# Patient Record
Sex: FEMALE | Race: WHITE | NOT HISPANIC OR LATINO | Employment: UNEMPLOYED | URBAN - NONMETROPOLITAN AREA
[De-identification: names, ages, dates, MRNs, and addresses within clinical notes are randomized per-mention and may not be internally consistent; named-entity substitution may affect disease eponyms.]

---

## 2017-01-18 ENCOUNTER — OFFICE VISIT (OUTPATIENT)
Dept: CARDIOLOGY | Facility: CLINIC | Age: 52
End: 2017-01-18

## 2017-01-18 VITALS
HEART RATE: 85 BPM | BODY MASS INDEX: 31.02 KG/M2 | WEIGHT: 158 LBS | HEIGHT: 60 IN | DIASTOLIC BLOOD PRESSURE: 94 MMHG | RESPIRATION RATE: 16 BRPM | OXYGEN SATURATION: 98 % | SYSTOLIC BLOOD PRESSURE: 162 MMHG

## 2017-01-18 DIAGNOSIS — R07.2 PRECORDIAL PAIN: Primary | ICD-10-CM

## 2017-01-18 DIAGNOSIS — I10 ESSENTIAL HYPERTENSION: ICD-10-CM

## 2017-01-18 DIAGNOSIS — E78.5 DYSLIPIDEMIA: ICD-10-CM

## 2017-01-18 PROCEDURE — 93000 ELECTROCARDIOGRAM COMPLETE: CPT | Performed by: INTERNAL MEDICINE

## 2017-01-18 PROCEDURE — 99213 OFFICE O/P EST LOW 20 MIN: CPT | Performed by: INTERNAL MEDICINE

## 2017-01-18 RX ORDER — AMLODIPINE BESYLATE 5 MG/1
10 TABLET ORAL DAILY
COMMUNITY
Start: 2017-01-13 | End: 2017-05-30 | Stop reason: SINTOL

## 2017-01-18 NOTE — MR AVS SNAPSHOT
Diana Alfaro   1/18/2017 3:00 PM   Office Visit    Dept Phone:  [phone]   Encounter #:  [id number]    Provider:  Cornell Perkins MD   Department:  [facility] CARDIOLOGY                Your Full Care Plan              Today's Medication Changes          These changes are accurate as of: 1/18/17  4:17 PM.  If you have any questions, ask your nurse or doctor.               Stop taking medication(s)listed here:     ibuprofen 800 MG tablet   Commonly known as:  ADVIL,MOTRIN   Stopped by:  Cornell Perkins MD           montelukast 10 MG tablet   Commonly known as:  SINGULAIR   Stopped by:  Cornell Perkins MD                      Your Updated Medication List          This list is accurate as of: 1/18/17  4:17 PM.  Always use your most recent med list.                amLODIPine 5 MG tablet   Commonly known as:  NORVASC       atorvastatin 40 MG tablet   Commonly known as:  LIPITOR   Take 1 tablet by mouth Daily.       vitamin D 71906 UNITS capsule capsule   Commonly known as:  ERGOCALCIFEROL               We Performed the Following     ECG 12 Lead       You Were Diagnosed With        Codes Comments    Precordial pain    -  Primary ICD-10-CM: R07.2  ICD-9-CM: 786.51     Essential hypertension     ICD-10-CM: I10  ICD-9-CM: 401.9     Dyslipidemia     ICD-10-CM: E78.5  ICD-9-CM: 272.4       Instructions     None    Patient Instructions History      Upcoming Appointments     Visit Type Date Time Department    FOLLOW UP 1/18/2017  3:00 PM [facility]    FOLLOW UP 7/20/2017  1:20 PM [facility] [place]enzie Signup     Our records indicate that you have declined [facility] MyChart signup. If you would like to sign up for [email or web address], please email [email or web address] or call [phone] to obtain an activation code.             Other Info from Your Visit           Your Appointments     Jul 20, 2017  1:20 PM EDT   Follow Up with Cornell Perkins MD  "  [facility] CARDIOLOGY (--)    [address] [place] [id number] [phone]           Arrive 15 minutes prior to appointment.              Allergies     No Known Allergies      Reason for Visit     Hypertension Follow up . Also S/P E.R. Jan 14th for CP and HTN      Vital Signs     Blood Pressure Pulse Respirations Height Weight Oxygen Saturation    162/94 (BP Location: Right arm, Patient Position: Sitting, Cuff Size: Adult) 85 16 60\" (152.4 cm) 158 lb (71.7 kg) 98%    Body Mass Index Smoking Status                30.86 kg/m2 Never Smoker          Problems and Diagnoses Noted     Dyslipidemia    High blood pressure    Precordial chest pain        "

## 2017-01-18 NOTE — PROGRESS NOTES
TERI Govea  Diana Alfaro  1965  01/18/2017    Patient Active Problem List   Diagnosis   • Dyslipidemia   • Essential hypertension       Dear TERI Govea:    Subjective     Diana Alfaro is a 51 y.o. female with the problems as listed above, presents for follow up of chest pain.      History of Present Illness: Patient has complaints of intermittent and sharp chest pain.  It's of mild intensity with no associated shortness of breath or diaphoresis.      No Known Allergies:      Current Outpatient Prescriptions:   •  amLODIPine (NORVASC) 5 MG tablet, Take 5 mg by mouth Daily., Disp: , Rfl:   •  atorvastatin (LIPITOR) 40 MG tablet, Take 1 tablet by mouth Daily., Disp: 30 tablet, Rfl: 11  •  vitamin D (ERGOCALCIFEROL) 31293 UNITS capsule capsule, Take 50,000 Units by mouth 1 (One) Time Per Week., Disp: , Rfl:       The following portions of the patient's history were reviewed and updated as appropriate: allergies, current medications, past family history, past medical history, past social history, past surgical history and problem list.    Social History   Substance Use Topics   • Smoking status: Never Smoker   • Smokeless tobacco: Never Used   • Alcohol use No       Review of Systems   Constitution: Positive for chills and malaise/fatigue.   HENT: Positive for headaches (occasional). Negative for nosebleeds.    Eyes: Positive for blurred vision.   Cardiovascular: Positive for chest pain (sharp pain lt sided ) and dyspnea on exertion.   Respiratory: Negative for cough and wheezing.    Gastrointestinal: Negative for constipation, diarrhea, melena, nausea and vomiting.   Genitourinary: Negative for dysuria.   Neurological: Positive for light-headedness (rare, ). Negative for dizziness.   Allergic/Immunologic: Positive for environmental allergies.       Objective   Vitals:    01/18/17 1507   BP: 162/94   BP Location: Right arm   Patient Position: Sitting   Cuff Size: Adult   Pulse: 85   Resp:  "16   SpO2: 98%   Weight: 158 lb (71.7 kg)   Height: 60\" (152.4 cm)     Body mass index is 30.86 kg/(m^2).        Physical Exam   Constitutional: She is oriented to person, place, and time. She appears well-developed and well-nourished.   HENT:   Mouth/Throat: Oropharynx is clear and moist.   Eyes: EOM are normal. Pupils are equal, round, and reactive to light.   Neck: Neck supple. No JVD present. No tracheal deviation present. No thyromegaly present.   Cardiovascular: Normal rate, regular rhythm, S1 normal and S2 normal.  Exam reveals no gallop and no friction rub.    No murmur heard.  Pulmonary/Chest: Effort normal and breath sounds normal.   Abdominal: Soft. Bowel sounds are normal. She exhibits no mass. There is no tenderness.   Musculoskeletal: Normal range of motion. She exhibits edema (mild leg edema).   Lymphadenopathy:     She has no cervical adenopathy.   Neurological: She is alert and oriented to person, place, and time.   Skin: Skin is warm and dry. No rash noted.   Psychiatric: She has a normal mood and affect.           ECG 12 Lead  Date/Time: 1/18/2017 3:15 PM  Performed by: CORNELL PACK  Authorized by: CORNELL PACK   Rhythm: sinus rhythm  BPM: 74              Assessment/Plan :     Diagnosis Plan   1. Precordial pain  ECG 12 Lead   2. Essential hypertension     3. Dyslipidemia          Recommendations:    Increase Amlodipine to 10 mg po daily.  Keep a check on BP.    Return in about 6 months (around 7/18/2017).    As always, I appreciate very much the opportunity to participate in the cardiovascular care of your patients.      With Best Regards,    Cornell Pack MD, [facility]    Dragon disclaimer:  Much of this encounter note is an electronic transcription/translation of spoken language to printed text. The electronic translation of spoken language may permit erroneous, or at times, nonsensical words or phrases to be inadvertently transcribed; Although I have reviewed the note for such errors, some " may still

## 2017-01-20 ENCOUNTER — TELEPHONE (OUTPATIENT)
Dept: CARDIOLOGY | Facility: CLINIC | Age: 52
End: 2017-01-20

## 2017-01-20 NOTE — TELEPHONE ENCOUNTER
Pt called and stated the ER doctor in [place] advised her to start taking a ASA 81 mg. She wanted to make sure it was okay for her to start taking ASA.

## 2017-04-27 ENCOUNTER — OFFICE VISIT (OUTPATIENT)
Dept: CARDIOLOGY | Facility: CLINIC | Age: 52
End: 2017-04-27

## 2017-04-27 VITALS
RESPIRATION RATE: 16 BRPM | DIASTOLIC BLOOD PRESSURE: 89 MMHG | HEIGHT: 60 IN | WEIGHT: 154 LBS | HEART RATE: 87 BPM | SYSTOLIC BLOOD PRESSURE: 156 MMHG | BODY MASS INDEX: 30.23 KG/M2

## 2017-04-27 DIAGNOSIS — I10 ESSENTIAL HYPERTENSION: ICD-10-CM

## 2017-04-27 DIAGNOSIS — E78.2 MIXED HYPERLIPIDEMIA: ICD-10-CM

## 2017-04-27 DIAGNOSIS — R07.2 PRECORDIAL PAIN: Primary | ICD-10-CM

## 2017-04-27 PROCEDURE — 99214 OFFICE O/P EST MOD 30 MIN: CPT | Performed by: INTERNAL MEDICINE

## 2017-04-27 PROCEDURE — 93000 ELECTROCARDIOGRAM COMPLETE: CPT | Performed by: INTERNAL MEDICINE

## 2017-04-27 RX ORDER — ASPIRIN 81 MG/1
81 TABLET ORAL DAILY
COMMUNITY
End: 2017-11-30

## 2017-04-27 RX ORDER — IBUPROFEN 800 MG/1
800 TABLET ORAL EVERY 6 HOURS PRN
Status: ON HOLD | COMMUNITY
End: 2018-03-04

## 2017-04-27 RX ORDER — AMLODIPINE BESYLATE 5 MG/1
5 TABLET ORAL DAILY
Qty: 30 TABLET | Refills: 5 | Status: SHIPPED | OUTPATIENT
Start: 2017-04-27 | End: 2017-05-30 | Stop reason: SINTOL

## 2017-04-27 RX ORDER — HYDROCHLOROTHIAZIDE 12.5 MG/1
12.5 TABLET ORAL DAILY
Qty: 30 TABLET | Refills: 5 | Status: SHIPPED | OUTPATIENT
Start: 2017-04-27 | End: 2017-11-30 | Stop reason: HOSPADM

## 2017-05-01 DIAGNOSIS — R07.2 PRECORDIAL PAIN: Primary | ICD-10-CM

## 2017-05-05 ENCOUNTER — APPOINTMENT (OUTPATIENT)
Dept: CARDIOLOGY | Facility: HOSPITAL | Age: 52
End: 2017-05-05
Attending: INTERNAL MEDICINE

## 2017-05-05 ENCOUNTER — APPOINTMENT (OUTPATIENT)
Dept: NUCLEAR MEDICINE | Facility: HOSPITAL | Age: 52
End: 2017-05-05
Attending: INTERNAL MEDICINE

## 2017-05-10 ENCOUNTER — HOSPITAL ENCOUNTER (EMERGENCY)
Facility: HOSPITAL | Age: 52
Discharge: HOME | End: 2017-05-10
Attending: EMERGENCY MEDICINE | Admitting: EMERGENCY MEDICINE

## 2017-05-10 ENCOUNTER — APPOINTMENT (OUTPATIENT)
Dept: CT IMAGING | Facility: HOSPITAL | Age: 52
End: 2017-05-10

## 2017-05-10 ENCOUNTER — APPOINTMENT (OUTPATIENT)
Dept: GENERAL RADIOLOGY | Facility: HOSPITAL | Age: 52
End: 2017-05-10

## 2017-05-10 VITALS
HEART RATE: 87 BPM | BODY MASS INDEX: 30.04 KG/M2 | SYSTOLIC BLOOD PRESSURE: 140 MMHG | DIASTOLIC BLOOD PRESSURE: 81 MMHG | HEIGHT: 60 IN | OXYGEN SATURATION: 100 % | TEMPERATURE: 98.5 F | RESPIRATION RATE: 18 BRPM | WEIGHT: 153 LBS

## 2017-05-10 DIAGNOSIS — R20.2 PARESTHESIA AND PAIN OF EXTREMITY: Primary | ICD-10-CM

## 2017-05-10 DIAGNOSIS — M79.609 PARESTHESIA AND PAIN OF EXTREMITY: Primary | ICD-10-CM

## 2017-05-10 LAB
ALBUMIN SERPL-MCNC: 4.4 G/DL (ref 3.5–5)
ALBUMIN/GLOB SERPL: 1.3 G/DL (ref 1.5–2.5)
ALP SERPL-CCNC: 99 U/L (ref 35–104)
ALT SERPL W P-5'-P-CCNC: 19 U/L (ref 10–36)
ANION GAP SERPL CALCULATED.3IONS-SCNC: 5.7 MMOL/L (ref 3.6–11.2)
AST SERPL-CCNC: 27 U/L (ref 10–30)
BASOPHILS # BLD AUTO: 0.01 10*3/MM3 (ref 0–0.3)
BASOPHILS NFR BLD AUTO: 0.1 % (ref 0–2)
BILIRUB SERPL-MCNC: 0.4 MG/DL (ref 0.2–1.8)
BILIRUB UR QL STRIP: NEGATIVE
BUN BLD-MCNC: 14 MG/DL (ref 7–21)
BUN/CREAT SERPL: 19.4 (ref 7–25)
CALCIUM SPEC-SCNC: 10.1 MG/DL (ref 7.7–10)
CHLORIDE SERPL-SCNC: 107 MMOL/L (ref 99–112)
CLARITY UR: CLEAR
CO2 SERPL-SCNC: 30.3 MMOL/L (ref 24.3–31.9)
COLOR UR: YELLOW
CREAT BLD-MCNC: 0.72 MG/DL (ref 0.43–1.29)
DEPRECATED RDW RBC AUTO: 38.4 FL (ref 37–54)
EOSINOPHIL # BLD AUTO: 0.01 10*3/MM3 (ref 0–0.7)
EOSINOPHIL NFR BLD AUTO: 0.1 % (ref 0–5)
ERYTHROCYTE [DISTWIDTH] IN BLOOD BY AUTOMATED COUNT: 12.7 % (ref 11.5–14.5)
GFR SERPL CREATININE-BSD FRML MDRD: 85 ML/MIN/1.73
GLOBULIN UR ELPH-MCNC: 3.4 GM/DL
GLUCOSE BLD-MCNC: 113 MG/DL (ref 70–110)
GLUCOSE UR STRIP-MCNC: NEGATIVE MG/DL
HCT VFR BLD AUTO: 43.7 % (ref 37–47)
HGB BLD-MCNC: 14.7 G/DL (ref 12–16)
HGB UR QL STRIP.AUTO: NEGATIVE
IMM GRANULOCYTES # BLD: 0.03 10*3/MM3 (ref 0–0.03)
IMM GRANULOCYTES NFR BLD: 0.4 % (ref 0–0.5)
KETONES UR QL STRIP: NEGATIVE
LEUKOCYTE ESTERASE UR QL STRIP.AUTO: NEGATIVE
LYMPHOCYTES # BLD AUTO: 1.18 10*3/MM3 (ref 1–3)
LYMPHOCYTES NFR BLD AUTO: 15.1 % (ref 21–51)
MCH RBC QN AUTO: 28.2 PG (ref 27–33)
MCHC RBC AUTO-ENTMCNC: 33.6 G/DL (ref 33–37)
MCV RBC AUTO: 83.7 FL (ref 80–94)
MONOCYTES # BLD AUTO: 0.53 10*3/MM3 (ref 0.1–0.9)
MONOCYTES NFR BLD AUTO: 6.8 % (ref 0–10)
NEUTROPHILS # BLD AUTO: 6.06 10*3/MM3 (ref 1.4–6.5)
NEUTROPHILS NFR BLD AUTO: 77.5 % (ref 30–70)
NITRITE UR QL STRIP: NEGATIVE
OSMOLALITY SERPL CALC.SUM OF ELEC: 286.3 MOSM/KG (ref 273–305)
PH UR STRIP.AUTO: 8 [PH] (ref 5–8)
PLATELET # BLD AUTO: 250 10*3/MM3 (ref 130–400)
PMV BLD AUTO: 11.2 FL (ref 6–10)
POTASSIUM BLD-SCNC: 3.7 MMOL/L (ref 3.5–5.3)
PROT SERPL-MCNC: 7.8 G/DL (ref 6–8)
PROT UR QL STRIP: NEGATIVE
RBC # BLD AUTO: 5.22 10*6/MM3 (ref 4.2–5.4)
SODIUM BLD-SCNC: 143 MMOL/L (ref 135–153)
SP GR UR STRIP: 1.01 (ref 1–1.03)
UROBILINOGEN UR QL STRIP: NORMAL
WBC NRBC COR # BLD: 7.82 10*3/MM3 (ref 4.5–12.5)

## 2017-05-10 PROCEDURE — 70450 CT HEAD/BRAIN W/O DYE: CPT

## 2017-05-10 PROCEDURE — 93010 ELECTROCARDIOGRAM REPORT: CPT | Performed by: INTERNAL MEDICINE

## 2017-05-10 PROCEDURE — 81003 URINALYSIS AUTO W/O SCOPE: CPT | Performed by: EMERGENCY MEDICINE

## 2017-05-10 PROCEDURE — 71010 XR CHEST 1 VW: CPT | Performed by: RADIOLOGY

## 2017-05-10 PROCEDURE — 85025 COMPLETE CBC W/AUTO DIFF WBC: CPT | Performed by: EMERGENCY MEDICINE

## 2017-05-10 PROCEDURE — 99284 EMERGENCY DEPT VISIT MOD MDM: CPT

## 2017-05-10 PROCEDURE — 93005 ELECTROCARDIOGRAM TRACING: CPT | Performed by: EMERGENCY MEDICINE

## 2017-05-10 PROCEDURE — 70450 CT HEAD/BRAIN W/O DYE: CPT | Performed by: RADIOLOGY

## 2017-05-10 PROCEDURE — 80053 COMPREHEN METABOLIC PANEL: CPT | Performed by: EMERGENCY MEDICINE

## 2017-05-10 PROCEDURE — 71010 HC CHEST PA OR AP: CPT

## 2017-05-16 ENCOUNTER — HOSPITAL ENCOUNTER (OUTPATIENT)
Dept: GENERAL RADIOLOGY | Facility: HOSPITAL | Age: 52
Discharge: HOME | End: 2017-05-16

## 2017-05-16 ENCOUNTER — HOSPITAL ENCOUNTER (OUTPATIENT)
Dept: NUCLEAR MEDICINE | Facility: HOSPITAL | Age: 52
Discharge: HOME | End: 2017-05-16
Attending: INTERNAL MEDICINE

## 2017-05-16 ENCOUNTER — TRANSCRIBE ORDERS (OUTPATIENT)
Dept: ADMINISTRATIVE | Facility: HOSPITAL | Age: 52
End: 2017-05-16

## 2017-05-16 ENCOUNTER — HOSPITAL ENCOUNTER (OUTPATIENT)
Dept: CARDIOLOGY | Facility: HOSPITAL | Age: 52
Discharge: HOME | End: 2017-05-16
Attending: INTERNAL MEDICINE

## 2017-05-16 ENCOUNTER — HOSPITAL ENCOUNTER (OUTPATIENT)
Dept: GENERAL RADIOLOGY | Facility: HOSPITAL | Age: 52
Discharge: HOME | End: 2017-05-16
Admitting: NURSE PRACTITIONER
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
## 2017-05-16 DIAGNOSIS — R07.2 PRECORDIAL PAIN: ICD-10-CM
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
## 2017-05-16 PROCEDURE — 25010000002 REGADENOSON 0.4 MG/5ML SOLUTION: Performed by: INTERNAL MEDICINE

## 2017-05-16 PROCEDURE — 93017 CV STRESS TEST TRACING ONLY: CPT

## 2017-05-16 PROCEDURE — 73502 X-RAY EXAM HIP UNI 2-3 VIEWS: CPT

## 2017-05-16 PROCEDURE — A9500 TC99M SESTAMIBI: HCPCS | Performed by: INTERNAL MEDICINE

## 2017-05-16 PROCEDURE — 72110 X-RAY EXAM L-2 SPINE 4/>VWS: CPT

## 2017-05-16 PROCEDURE — 72110 X-RAY EXAM L-2 SPINE 4/>VWS: CPT | Performed by: RADIOLOGY

## 2017-05-16 PROCEDURE — 0 TECHNETIUM SESTAMIBI: Performed by: INTERNAL MEDICINE

## 2017-05-16 PROCEDURE — 78452 HT MUSCLE IMAGE SPECT MULT: CPT | Performed by: INTERNAL MEDICINE

## 2017-05-16 PROCEDURE — 78451 HT MUSCLE IMAGE SPECT SING: CPT

## 2017-05-16 PROCEDURE — 73502 X-RAY EXAM HIP UNI 2-3 VIEWS: CPT | Performed by: RADIOLOGY

## 2017-05-16 PROCEDURE — 93018 CV STRESS TEST I&R ONLY: CPT | Performed by: INTERNAL MEDICINE

## 2017-05-16 RX ORDER — CETIRIZINE HYDROCHLORIDE 10 MG/1
10 TABLET ORAL DAILY
COMMUNITY
End: 2017-11-30 | Stop reason: ALTCHOICE

## 2017-05-16 RX ADMIN — REGADENOSON 0.4 MG: 0.08 INJECTION, SOLUTION INTRAVENOUS at 10:40

## 2017-05-16 RX ADMIN — Medication 1 DOSE: at 10:40

## 2017-05-16 RX ADMIN — Medication 1 DOSE: at 09:30

## 2017-05-17 LAB
BH CV NUCLEAR PRIOR STUDY: 3
BH CV STRESS BP STAGE 1: NORMAL
BH CV STRESS COMMENTS STAGE 1: NORMAL
BH CV STRESS DOSE REGADENOSON STAGE 1: 0.4
BH CV STRESS DURATION MIN STAGE 1: 0
BH CV STRESS DURATION SEC STAGE 1: 15
BH CV STRESS HR STAGE 1: 130
BH CV STRESS PROTOCOL 1: NORMAL
BH CV STRESS RECOVERY BP: NORMAL MMHG
BH CV STRESS RECOVERY HR: 95 BPM
BH CV STRESS STAGE 1: 1
MAXIMAL PREDICTED HEART RATE: 167 BPM
PERCENT MAX PREDICTED HR: 77.84 %
STRESS BASELINE BP: NORMAL MMHG
STRESS BASELINE HR: 70 BPM
STRESS PERCENT HR: 92 %
STRESS POST PEAK BP: NORMAL MMHG
STRESS POST PEAK HR: 130 BPM
STRESS TARGET HR: 142 BPM

## 2017-05-19 ENCOUNTER — TELEPHONE (OUTPATIENT)
Dept: CARDIOLOGY | Facility: CLINIC | Age: 52
End: 2017-05-19

## 2017-05-22 ENCOUNTER — TELEPHONE (OUTPATIENT)
Dept: CARDIOLOGY | Facility: CLINIC | Age: 52
End: 2017-05-22

## 2017-05-24 RX ORDER — METOPROLOL TARTRATE 50 MG/1
50 TABLET, FILM COATED ORAL 2 TIMES DAILY
Qty: 60 TABLET | Refills: 3 | Status: SHIPPED | OUTPATIENT
Start: 2017-05-24 | End: 2017-11-30 | Stop reason: HOSPADM

## 2017-05-25 ENCOUNTER — TELEPHONE (OUTPATIENT)
Dept: CARDIOLOGY | Facility: CLINIC | Age: 52
End: 2017-05-25

## 2017-05-25 ENCOUNTER — APPOINTMENT (OUTPATIENT)
Dept: GENERAL RADIOLOGY | Facility: HOSPITAL | Age: 52
End: 2017-05-25

## 2017-05-25 ENCOUNTER — HOSPITAL ENCOUNTER (EMERGENCY)
Facility: HOSPITAL | Age: 52
Discharge: HOME | End: 2017-05-25
Attending: FAMILY MEDICINE | Admitting: FAMILY MEDICINE

## 2017-05-25 VITALS
RESPIRATION RATE: 16 BRPM | HEIGHT: 60 IN | TEMPERATURE: 98 F | OXYGEN SATURATION: 100 % | BODY MASS INDEX: 30.04 KG/M2 | DIASTOLIC BLOOD PRESSURE: 80 MMHG | SYSTOLIC BLOOD PRESSURE: 152 MMHG | WEIGHT: 153 LBS | HEART RATE: 62 BPM

## 2017-05-25 DIAGNOSIS — R07.9 CHEST PAIN WITH LOW RISK FOR CARDIAC ETIOLOGY: Primary | ICD-10-CM

## 2017-05-25 LAB
6-ACETYL MORPHINE: NEGATIVE
ALBUMIN SERPL-MCNC: 4.4 G/DL (ref 3.5–5)
ALBUMIN/GLOB SERPL: 1.3 G/DL (ref 1.5–2.5)
ALP SERPL-CCNC: 95 U/L (ref 35–104)
ALT SERPL W P-5'-P-CCNC: 21 U/L (ref 10–36)
AMPHET+METHAMPHET UR QL: NEGATIVE
ANION GAP SERPL CALCULATED.3IONS-SCNC: 8.5 MMOL/L (ref 3.6–11.2)
APTT PPP: 24.7 SECONDS (ref 24.4–31)
AST SERPL-CCNC: 23 U/L (ref 10–30)
BARBITURATES UR QL SCN: NEGATIVE
BASOPHILS # BLD AUTO: 0.03 10*3/MM3 (ref 0–0.3)
BASOPHILS NFR BLD AUTO: 0.4 % (ref 0–2)
BENZODIAZ UR QL SCN: NEGATIVE
BILIRUB SERPL-MCNC: 0.5 MG/DL (ref 0.2–1.8)
BILIRUB UR QL STRIP: NEGATIVE
BNP SERPL-MCNC: 26 PG/ML (ref 0–100)
BUN BLD-MCNC: 10 MG/DL (ref 7–21)
BUN/CREAT SERPL: 12.8 (ref 7–25)
BUPRENORPHINE SERPL-MCNC: NEGATIVE NG/ML
CALCIUM SPEC-SCNC: 9.6 MG/DL (ref 7.7–10)
CANNABINOIDS SERPL QL: NEGATIVE
CHLORIDE SERPL-SCNC: 107 MMOL/L (ref 99–112)
CK MB SERPL-CCNC: 0.87 NG/ML (ref 0–5)
CK MB SERPL-CCNC: 1.22 NG/ML (ref 0–5)
CK MB SERPL-RTO: 1.2 % (ref 0–3)
CK MB SERPL-RTO: 1.6 % (ref 0–3)
CK SERPL-CCNC: 74 U/L (ref 24–173)
CK SERPL-CCNC: 76 U/L (ref 24–173)
CLARITY UR: CLEAR
CO2 SERPL-SCNC: 28.5 MMOL/L (ref 24.3–31.9)
COCAINE UR QL: NEGATIVE
COLOR UR: YELLOW
CREAT BLD-MCNC: 0.78 MG/DL (ref 0.43–1.29)
DEPRECATED RDW RBC AUTO: 38.8 FL (ref 37–54)
EOSINOPHIL # BLD AUTO: 0.03 10*3/MM3 (ref 0–0.7)
EOSINOPHIL NFR BLD AUTO: 0.4 % (ref 0–5)
ERYTHROCYTE [DISTWIDTH] IN BLOOD BY AUTOMATED COUNT: 12.8 % (ref 11.5–14.5)
GFR SERPL CREATININE-BSD FRML MDRD: 77 ML/MIN/1.73
GLOBULIN UR ELPH-MCNC: 3.5 GM/DL
GLUCOSE BLD-MCNC: 109 MG/DL (ref 70–110)
GLUCOSE UR STRIP-MCNC: NEGATIVE MG/DL
HCT VFR BLD AUTO: 43.2 % (ref 37–47)
HGB BLD-MCNC: 14.5 G/DL (ref 12–16)
HGB UR QL STRIP.AUTO: NEGATIVE
IMM GRANULOCYTES # BLD: 0.01 10*3/MM3 (ref 0–0.03)
IMM GRANULOCYTES NFR BLD: 0.1 % (ref 0–0.5)
INR PPP: 0.89 (ref 0.8–1.1)
KETONES UR QL STRIP: NEGATIVE
LEUKOCYTE ESTERASE UR QL STRIP.AUTO: NEGATIVE
LYMPHOCYTES # BLD AUTO: 2.17 10*3/MM3 (ref 1–3)
LYMPHOCYTES NFR BLD AUTO: 26.8 % (ref 21–51)
MCH RBC QN AUTO: 28.3 PG (ref 27–33)
MCHC RBC AUTO-ENTMCNC: 33.6 G/DL (ref 33–37)
MCV RBC AUTO: 84.2 FL (ref 80–94)
MDMA UR QL SCN: NEGATIVE
METHADONE UR QL SCN: NEGATIVE
MONOCYTES # BLD AUTO: 0.55 10*3/MM3 (ref 0.1–0.9)
MONOCYTES NFR BLD AUTO: 6.8 % (ref 0–10)
NEUTROPHILS # BLD AUTO: 5.31 10*3/MM3 (ref 1.4–6.5)
NEUTROPHILS NFR BLD AUTO: 65.5 % (ref 30–70)
NITRITE UR QL STRIP: NEGATIVE
OPIATES UR QL: NEGATIVE
OSMOLALITY SERPL CALC.SUM OF ELEC: 286.5 MOSM/KG (ref 273–305)
OXYCODONE UR QL SCN: NEGATIVE
PCP UR QL SCN: NEGATIVE
PH UR STRIP.AUTO: 7.5 [PH] (ref 5–8)
PLATELET # BLD AUTO: 289 10*3/MM3 (ref 130–400)
PMV BLD AUTO: 11.5 FL (ref 6–10)
POTASSIUM BLD-SCNC: 3.9 MMOL/L (ref 3.5–5.3)
PROT SERPL-MCNC: 7.9 G/DL (ref 6–8)
PROT UR QL STRIP: NEGATIVE
PROTHROMBIN TIME: 9.8 SECONDS (ref 9.8–11.9)
RBC # BLD AUTO: 5.13 10*6/MM3 (ref 4.2–5.4)
SODIUM BLD-SCNC: 144 MMOL/L (ref 135–153)
SP GR UR STRIP: <=1.005 (ref 1–1.03)
TROPONIN I SERPL-MCNC: <0.006 NG/ML
TROPONIN I SERPL-MCNC: <0.006 NG/ML
UROBILINOGEN UR QL STRIP: NORMAL
WBC NRBC COR # BLD: 8.1 10*3/MM3 (ref 4.5–12.5)

## 2017-05-25 PROCEDURE — 80307 DRUG TEST PRSMV CHEM ANLYZR: CPT | Performed by: FAMILY MEDICINE

## 2017-05-25 PROCEDURE — 87086 URINE CULTURE/COLONY COUNT: CPT | Performed by: FAMILY MEDICINE

## 2017-05-25 PROCEDURE — 80053 COMPREHEN METABOLIC PANEL: CPT | Performed by: FAMILY MEDICINE

## 2017-05-25 PROCEDURE — 85730 THROMBOPLASTIN TIME PARTIAL: CPT | Performed by: FAMILY MEDICINE

## 2017-05-25 PROCEDURE — 85610 PROTHROMBIN TIME: CPT | Performed by: FAMILY MEDICINE

## 2017-05-25 PROCEDURE — 99284 EMERGENCY DEPT VISIT MOD MDM: CPT

## 2017-05-25 PROCEDURE — 93005 ELECTROCARDIOGRAM TRACING: CPT | Performed by: FAMILY MEDICINE

## 2017-05-25 PROCEDURE — 85025 COMPLETE CBC W/AUTO DIFF WBC: CPT | Performed by: FAMILY MEDICINE

## 2017-05-25 PROCEDURE — 82550 ASSAY OF CK (CPK): CPT | Performed by: FAMILY MEDICINE

## 2017-05-25 PROCEDURE — 81003 URINALYSIS AUTO W/O SCOPE: CPT | Performed by: FAMILY MEDICINE

## 2017-05-25 PROCEDURE — 84484 ASSAY OF TROPONIN QUANT: CPT | Performed by: FAMILY MEDICINE

## 2017-05-25 PROCEDURE — 83880 ASSAY OF NATRIURETIC PEPTIDE: CPT | Performed by: FAMILY MEDICINE

## 2017-05-25 PROCEDURE — 71010 XR CHEST 1 VW: CPT | Performed by: RADIOLOGY

## 2017-05-25 PROCEDURE — 36415 COLL VENOUS BLD VENIPUNCTURE: CPT

## 2017-05-25 PROCEDURE — 71010 HC CHEST PA OR AP: CPT

## 2017-05-25 PROCEDURE — 82553 CREATINE MB FRACTION: CPT | Performed by: FAMILY MEDICINE

## 2017-05-25 RX ORDER — SODIUM CHLORIDE 0.9 % (FLUSH) 0.9 %
10 SYRINGE (ML) INJECTION AS NEEDED
Status: DISCONTINUED | OUTPATIENT
Start: 2017-05-25 | End: 2017-05-25 | Stop reason: HOSPADM

## 2017-05-25 RX ORDER — ASPIRIN 81 MG/1
324 TABLET, CHEWABLE ORAL ONCE
Status: COMPLETED | OUTPATIENT
Start: 2017-05-25 | End: 2017-05-25

## 2017-05-25 RX ADMIN — ASPIRIN 324 MG: 81 TABLET, CHEWABLE ORAL at 09:59

## 2017-05-26 ENCOUNTER — TELEPHONE (OUTPATIENT)
Dept: CARDIOLOGY | Facility: CLINIC | Age: 52
End: 2017-05-26

## 2017-05-27 LAB — BACTERIA SPEC AEROBE CULT: NORMAL

## 2017-05-30 ENCOUNTER — TELEPHONE (OUTPATIENT)
Dept: CARDIOLOGY | Facility: CLINIC | Age: 52
End: 2017-05-30

## 2017-05-30 DIAGNOSIS — I10 ESSENTIAL HYPERTENSION: Primary | ICD-10-CM

## 2017-05-30 DIAGNOSIS — Z79.899 DRUG THERAPY CHANGED: ICD-10-CM

## 2017-05-30 RX ORDER — LOSARTAN POTASSIUM 25 MG/1
25 TABLET ORAL DAILY
Qty: 30 TABLET | Refills: 1 | Status: SHIPPED | OUTPATIENT
Start: 2017-05-30 | End: 2017-07-12 | Stop reason: SDUPTHER

## 2017-05-31 ENCOUNTER — TELEPHONE (OUTPATIENT)
Dept: CARDIOLOGY | Facility: CLINIC | Age: 52
End: 2017-05-31

## 2017-06-06 ENCOUNTER — TELEPHONE (OUTPATIENT)
Dept: CARDIOLOGY | Facility: CLINIC | Age: 52
End: 2017-06-06

## 2017-06-06 NOTE — TELEPHONE ENCOUNTER
----- Message from Brittney Pettit sent at 6/6/2017 11:02 AM EDT -----  Regarding: Order  Please call patient regarding an order for a kidney panel.  Patient is requesting order be faxed to:    [facility] [phone]      Sent labs to [facility].

## 2017-06-08 ENCOUNTER — TELEPHONE (OUTPATIENT)
Dept: CARDIOLOGY | Facility: CLINIC | Age: 52
End: 2017-06-08

## 2017-06-08 NOTE — TELEPHONE ENCOUNTER
As long as she is not symptomatic with the bradycardia, continue current therapy. If she is having dizziness or increased fatigue, let me know and we will decrease metoprolol. BP looks good.

## 2017-06-09 ENCOUNTER — TELEPHONE (OUTPATIENT)
Dept: CARDIOLOGY | Facility: CLINIC | Age: 52
End: 2017-06-09

## 2017-06-09 NOTE — TELEPHONE ENCOUNTER
Pt called and stated her BP was 131/72 HR 49 last night. This morning it was 120/76 HR 58. She is still having weakness.

## 2017-06-09 NOTE — TELEPHONE ENCOUNTER
We just adjusted the metoprolol yesterday, it will take a few days to regulate. It seems to have improved this morning. Continue to monitor.

## 2017-06-13 ENCOUNTER — TELEPHONE (OUTPATIENT)
Dept: CARDIOLOGY | Facility: CLINIC | Age: 52
End: 2017-06-13

## 2017-06-13 NOTE — TELEPHONE ENCOUNTER
Pt called and wanted to give us some of her readings on her BP. Last night her BP was 142/82 HR 67, 137/81 HR 70, 128/83 HR 54. I advised her that I will let Cathleen know.

## 2017-06-15 ENCOUNTER — TELEPHONE (OUTPATIENT)
Dept: CARDIOLOGY | Facility: CLINIC | Age: 52
End: 2017-06-15

## 2017-06-15 NOTE — TELEPHONE ENCOUNTER
Patient called to report recent blood pressure readings. As follows:            06/14/2017  135/86 HR 62           143/84  HR 64, 129/85 HR  69     06/15/2017 126/71 HR 59

## 2017-06-16 ENCOUNTER — TELEPHONE (OUTPATIENT)
Dept: CARDIOLOGY | Facility: CLINIC | Age: 52
End: 2017-06-16

## 2017-06-16 NOTE — TELEPHONE ENCOUNTER
Diana called to give us a B/P report.  129/76 HR is 61. States she is getting her strength back but not 100% as of yet.

## 2017-06-19 ENCOUNTER — TELEPHONE (OUTPATIENT)
Dept: CARDIOLOGY | Facility: CLINIC | Age: 52
End: 2017-06-19

## 2017-06-19 NOTE — TELEPHONE ENCOUNTER
Pt called to give us a B/P reading.  Last nite, 06/18/17 was 141/88 HR 70, and this a.m 06/19/2017 was 157/91 HR 69.  Call cell no if any questions.

## 2017-06-20 ENCOUNTER — TELEPHONE (OUTPATIENT)
Dept: CARDIOLOGY | Facility: CLINIC | Age: 52
End: 2017-06-20

## 2017-06-20 NOTE — TELEPHONE ENCOUNTER
Pt called this morning to let us know what her BP was it was 140/85 HR 56. I advised again what Cathleen had stated yesterday. She said that she knew but her apt was until August and didn't want to wait til then to bring a log. I advised her that every time she has called that Cathleen has said her BP was good just to continue monitoring her BP and bring the log with her at her next office visit. I advised her that she can call on Monday's to let us know what her BP was for the previous week if she didn't want to wait til August. She expressed understanding.

## 2017-06-20 NOTE — TELEPHONE ENCOUNTER
If she would like to keep a log over the next week and drop it off at the office for review I will look over her readings.

## 2017-07-12 ENCOUNTER — TELEPHONE (OUTPATIENT)
Dept: CARDIOLOGY | Facility: CLINIC | Age: 52
End: 2017-07-12

## 2017-07-12 DIAGNOSIS — I10 ESSENTIAL HYPERTENSION: ICD-10-CM

## 2017-07-12 RX ORDER — LOSARTAN POTASSIUM 25 MG/1
25 TABLET ORAL DAILY
Qty: 30 TABLET | Refills: 1 | Status: SHIPPED | OUTPATIENT
Start: 2017-07-12 | End: 2017-07-14 | Stop reason: SDUPTHER

## 2017-07-12 NOTE — TELEPHONE ENCOUNTER
Pt states she d/c'd Metoprolol and started taking the Losartan 25 mg bid instead of qd on her own beginning two weeks ago. She says she feels much better and her avg bp is 125/75-82, HR in mid-upper 60s.  She is not tired anymore.     She is asking if we can send in Losartan 25 mg po bid.

## 2017-07-13 DIAGNOSIS — I10 ESSENTIAL HYPERTENSION: ICD-10-CM

## 2017-07-13 RX ORDER — LOSARTAN POTASSIUM 25 MG/1
TABLET ORAL
Qty: 30 TABLET | Refills: 0 | OUTPATIENT
Start: 2017-07-13

## 2017-07-13 NOTE — TELEPHONE ENCOUNTER
HE is going to end up sending prescription for losartan 25 mg by mouth twice a day for one month with 3 refills.

## 2017-07-14 RX ORDER — LOSARTAN POTASSIUM 25 MG/1
25 TABLET ORAL DAILY
Qty: 30 TABLET | Refills: 3 | Status: SHIPPED | OUTPATIENT
Start: 2017-07-14 | End: 2017-07-14

## 2017-07-14 RX ORDER — LOSARTAN POTASSIUM 25 MG/1
25 TABLET ORAL 2 TIMES DAILY
Qty: 60 TABLET | Refills: 3 | Status: SHIPPED | OUTPATIENT
Start: 2017-07-14 | End: 2017-10-23 | Stop reason: SDUPTHER

## 2017-08-03 ENCOUNTER — OFFICE VISIT (OUTPATIENT)
Dept: CARDIOLOGY | Facility: CLINIC | Age: 52
End: 2017-08-03

## 2017-08-03 VITALS
DIASTOLIC BLOOD PRESSURE: 86 MMHG | SYSTOLIC BLOOD PRESSURE: 155 MMHG | WEIGHT: 140 LBS | BODY MASS INDEX: 27.48 KG/M2 | HEART RATE: 75 BPM | HEIGHT: 60 IN | RESPIRATION RATE: 16 BRPM

## 2017-08-03 DIAGNOSIS — R07.2 PRECORDIAL PAIN: Primary | ICD-10-CM

## 2017-08-03 DIAGNOSIS — E78.2 MIXED HYPERLIPIDEMIA: ICD-10-CM

## 2017-08-03 DIAGNOSIS — I10 ESSENTIAL HYPERTENSION: ICD-10-CM

## 2017-08-03 PROCEDURE — 99213 OFFICE O/P EST LOW 20 MIN: CPT | Performed by: INTERNAL MEDICINE

## 2017-08-03 NOTE — PROGRESS NOTES
TERI Govea  Diana Alfaro  1965  08/03/2017    Patient Active Problem List   Diagnosis   • Dyslipidemia   • Essential hypertension   • Hyperlipidemia   • Precordial pain   • Drug therapy changed       Dear TERI Govea:    Subjective     Diana Alfaro is a 51 y.o. female with the problems as listed above, presents for follow-up of chest pain.      History of Present Illness:Ms. Alfaro is a pleasant 48-year-old  female who was recently evaluated for complaints of chest pains.  She underwent a nuclear stress test in May 2017 that revealed no evidence of myocardial ischemia.  She is here today for regular cardiology follow-up.  She denies any further episodes of chest pains or shortness of breath.  Overall she seems to be feeling better.  She didn't bring the blood pressure recordings from home which I reviewed.  Blood pressure at home has been running mostly in 110s to 120s systolic and 70s to 80s diastolic.       No Known Allergies:      Current Outpatient Prescriptions:   •  aspirin 81 MG EC tablet, Take 81 mg by mouth Daily., Disp: , Rfl:   •  atorvastatin (LIPITOR) 40 MG tablet, Take 1 tablet by mouth Daily. (Patient taking differently: Take 20 mg by mouth Daily.), Disp: 30 tablet, Rfl: 11  •  ibuprofen (ADVIL,MOTRIN) 800 MG tablet, Take 800 mg by mouth Every 6 (Six) Hours As Needed for Mild Pain (1-3)., Disp: , Rfl:   •  LORATADINE PO, Take 5 mg by mouth Daily., Disp: , Rfl:   •  losartan (COZAAR) 25 MG tablet, Take 1 tablet by mouth 2 (Two) Times a Day., Disp: 60 tablet, Rfl: 3  •  Nutritional Supplements (CHOLESTEROL DEFENSE PO), Take  by mouth., Disp: , Rfl:   •  B Complex Vitamins (VITAMIN B-COMPLEX PO), Take  by mouth., Disp: , Rfl:   •  cetirizine (zyrTEC) 10 MG tablet, Take 10 mg by mouth Daily., Disp: , Rfl:   •  hydrochlorothiazide (HYDRODIURIL) 12.5 MG tablet, Take 1 tablet by mouth Daily., Disp: 30 tablet, Rfl: 5  •  metoprolol tartrate (LOPRESSOR) 50 MG tablet, Take  "1 tablet by mouth 2 (Two) Times a Day., Disp: 60 tablet, Rfl: 3  •  vitamin D (ERGOCALCIFEROL) 95713 UNITS capsule capsule, Take 50,000 Units by mouth 1 (One) Time Per Week., Disp: , Rfl:       The following portions of the patient's history were reviewed and updated as appropriate: allergies, current medications, past family history, past medical history, past social history, past surgical history and problem list.    Social History   Substance Use Topics   • Smoking status: Never Smoker   • Smokeless tobacco: Never Used   • Alcohol use No       Review of Systems   Constitution: Negative for chills and fever.   HENT: Negative for headaches, nosebleeds and sore throat.    Respiratory: Negative for cough, hemoptysis and wheezing.    Gastrointestinal: Negative for abdominal pain, hematemesis, hematochezia, melena, nausea and vomiting.   Genitourinary: Negative for dysuria and hematuria.       Objective   Vitals:    08/03/17 1311   BP: 155/86   Pulse: 75   Resp: 16   Weight: 140 lb (63.5 kg)   Height: 60\" (152.4 cm)     Body mass index is 27.34 kg/(m^2).        Physical Exam   Constitutional: She is oriented to person, place, and time. She appears well-developed and well-nourished.   HENT:   Head: Normocephalic.   Eyes: Conjunctivae and EOM are normal.   Neck: Normal range of motion. Neck supple. No JVD present. No tracheal deviation present. No thyromegaly present.   Cardiovascular: Normal rate and regular rhythm.  Exam reveals no gallop and no friction rub.    No murmur heard.  Pulmonary/Chest: Breath sounds normal. No respiratory distress. She has no wheezes. She has no rales.   Abdominal: Soft. Bowel sounds are normal. She exhibits no mass. There is no tenderness.   Musculoskeletal: She exhibits no edema.   Neurological: She is alert and oriented to person, place, and time. No cranial nerve deficit.   Skin: Skin is warm and dry.   Psychiatric: She has a normal mood and affect.       Lab Results   Component Value " Date     05/25/2017    K 3.9 05/25/2017     05/25/2017    CO2 28.5 05/25/2017    BUN 10 05/25/2017    CREATININE 0.78 05/25/2017    GLUCOSE 109 05/25/2017    CALCIUM 9.6 05/25/2017    AST 23 05/25/2017    ALT 21 05/25/2017    ALKPHOS 95 05/25/2017    LABIL2 1.3 (L) 05/25/2017     Lab Results   Component Value Date    CKTOTAL 74 05/25/2017     Lab Results   Component Value Date    WBC 8.10 05/25/2017    HGB 14.5 05/25/2017    HCT 43.2 05/25/2017     05/25/2017     Lab Results   Component Value Date    INR 0.89 05/25/2017       Lab Results   Component Value Date    BNP 26.0 05/25/2017   Procedures    Recent Lexiscan sestamibi study in May 2017 revealed:  · Findings consistent with a normal ECG stress test.  · Myocardial perfusion imaging indicates a normal myocardial perfusion study with no evidence of ischemia.  · Normal LV cavity size. Normal LV wall motion noted.  · Left ventricular ejection fraction is hyperdynamic (Calculated EF > 70%).  · Impressions are consistent with a low risk study.  · There is no prior study available for comparison    Assessment/Plan      1. Precordial pain probably noncardiac     2. Recent normal Lexiscan sestamibi study.      3. Essential hypertension , well controlled at home.      4. Mixed hyperlipidemia, On atorvastatin 40 mg daily.           Recommendations:  1. I have reviewed the results of the nuclear stresses with the patient.  2. We'll see her back in a year.    Return in about 1 year (around 8/3/2018).    As always, I appreciate very much the opportunity to participate in the cardiovascular care of your patients.      With Best Regards,    Cornell Perkins MD, [facility]    Dragon disclaimer:  Much of this encounter note is an electronic transcription/translation of spoken language to printed text. The electronic translation of spoken language may permit erroneous, or at times, nonsensical words or phrases to be inadvertently transcribed; Although I have reviewed  the note for such errors, some may still exist.

## 2017-10-22 RX ORDER — LOSARTAN POTASSIUM 50 MG/1
TABLET ORAL
Qty: 30 TABLET | Refills: 0 | Status: CANCELLED | OUTPATIENT
Start: 2017-10-22

## 2017-10-23 ENCOUNTER — TELEPHONE (OUTPATIENT)
Dept: CARDIOLOGY | Facility: CLINIC | Age: 52
End: 2017-10-23

## 2017-10-23 RX ORDER — LOSARTAN POTASSIUM 25 MG/1
25 TABLET ORAL 2 TIMES DAILY
Qty: 60 TABLET | Refills: 11 | Status: SHIPPED | OUTPATIENT
Start: 2017-10-23 | End: 2017-11-30 | Stop reason: SDUPTHER

## 2017-10-23 NOTE — TELEPHONE ENCOUNTER
Pt called and said she needs refills on her losartin at the [facility] in [place]. She said that she doesn't come back to see dr. mraiscal for a year and was wanting to get refills on this if she could.       Thank you

## 2017-11-21 ENCOUNTER — TRANSCRIBE ORDERS (OUTPATIENT)
Dept: ADMINISTRATIVE | Facility: HOSPITAL | Age: 52
End: 2017-11-21

## 2017-11-21 DIAGNOSIS — Z12.31 VISIT FOR SCREENING MAMMOGRAM: Primary | ICD-10-CM

## 2017-11-30 ENCOUNTER — TELEPHONE (OUTPATIENT)
Dept: CARDIOLOGY | Facility: CLINIC | Age: 52
End: 2017-11-30

## 2017-11-30 RX ORDER — LOSARTAN POTASSIUM 100 MG/1
100 TABLET ORAL DAILY
Start: 2017-11-30 | End: 2018-01-22 | Stop reason: ALTCHOICE

## 2017-11-30 NOTE — TELEPHONE ENCOUNTER
Patient called and stated she had gone to the ER last wknd in [place] due to bilat tingling numbness and cold in feet.  She stated they didn't do any tests , xrays, ect just did an EKG and stated it was ok.  I then asked did she follow up with her PCP, she stated she did and they only checked her pulses in her legs and said she had no blood clots.  I again asked her why she was wanting to see us for that her PCP should refer her to proper DR for this problem. Neuro?    I then asked her to go over her meds with me and as we are going over them she hasnt been taking several of her meds.  She stated Dr. Perkins had taken her off of ASA, Metoprolol, and she didn't know anything about Hydrochlorithiazide.  Also stated her PCP has increased her Losartan that her B/P was high.  Again she was very confused about her meds.  I explained to her that I would call and get her records from the ER at [place] and also her PCP. And in the meantime to contact her PCP and if they couldn't see her or felt she needed to see us to call us back for an appt.    I updated meds in her chart and called and requested records.

## 2017-12-18 ENCOUNTER — APPOINTMENT (OUTPATIENT)
Dept: MAMMOGRAPHY | Facility: HOSPITAL | Age: 52
End: 2017-12-18

## 2018-01-22 ENCOUNTER — OFFICE VISIT (OUTPATIENT)
Dept: CARDIOLOGY | Facility: CLINIC | Age: 53
End: 2018-01-22

## 2018-01-22 VITALS
SYSTOLIC BLOOD PRESSURE: 181 MMHG | WEIGHT: 137.2 LBS | HEIGHT: 61 IN | RESPIRATION RATE: 16 BRPM | BODY MASS INDEX: 25.9 KG/M2 | DIASTOLIC BLOOD PRESSURE: 93 MMHG | HEART RATE: 86 BPM

## 2018-01-22 DIAGNOSIS — R07.2 PRECORDIAL PAIN: Primary | ICD-10-CM

## 2018-01-22 DIAGNOSIS — E78.5 DYSLIPIDEMIA: ICD-10-CM

## 2018-01-22 DIAGNOSIS — I10 ESSENTIAL HYPERTENSION: ICD-10-CM

## 2018-01-22 DIAGNOSIS — E78.2 MIXED HYPERLIPIDEMIA: ICD-10-CM

## 2018-01-22 PROCEDURE — 93000 ELECTROCARDIOGRAM COMPLETE: CPT | Performed by: INTERNAL MEDICINE

## 2018-01-22 PROCEDURE — 99214 OFFICE O/P EST MOD 30 MIN: CPT | Performed by: INTERNAL MEDICINE

## 2018-01-22 RX ORDER — LOSARTAN POTASSIUM AND HYDROCHLOROTHIAZIDE 12.5; 5 MG/1; MG/1
1 TABLET ORAL DAILY
Qty: 30 TABLET | Refills: 5 | Status: SHIPPED | OUTPATIENT
Start: 2018-01-22 | End: 2018-02-21 | Stop reason: SINTOL

## 2018-01-22 NOTE — PROGRESS NOTES
TERI Govea  Diana Alfaro  1965  01/22/2018    Patient Active Problem List   Diagnosis   • Dyslipidemia   • Essential hypertension   • Hyperlipidemia   • Precordial pain   • Drug therapy changed       Dear TERI Govea:    Subjective     Diana Alfaro is a 52 y.o. female with the problems as listed above, presents      History of Present Illness:This Angelina is a pleasant 52-year-old  female with history of chest pains for which she has had a nuclear stress test back in May 2017 that was normal.  She is here for her regular cardiology follow-up.  She has some intermittent mild chest pains which are sharp pain, and go spontaneously with no relation to exertion.  There is no associated shortness of breath or sweating..  She also has some uncontrolled blood pressure.  Her losartan was recently increased 100 mg daily.  Her blood pressure at home reportedly runs around 140s to 150s systolic and 85-90 diastolic.    No Known Allergies:      Current Outpatient Prescriptions:   •  atorvastatin (LIPITOR) 40 MG tablet, Take 1 tablet by mouth Daily. (Patient taking differently: Take 20 mg by mouth Daily.), Disp: 30 tablet, Rfl: 11  •  B Complex Vitamins (VITAMIN B-COMPLEX PO), Take  by mouth., Disp: , Rfl:   •  ibuprofen (ADVIL,MOTRIN) 800 MG tablet, Take 800 mg by mouth Every 6 (Six) Hours As Needed for Mild Pain (1-3)., Disp: , Rfl:   •  LORATADINE PO, Take 10 mg by mouth Daily., Disp: , Rfl:   •  Nutritional Supplements (CHOLESTEROL DEFENSE PO), Take  by mouth., Disp: , Rfl:   •  vitamin D (ERGOCALCIFEROL) 47410 UNITS capsule capsule, Take 50,000 Units by mouth 1 (One) Time Per Week., Disp: , Rfl:   •  losartan-hydrochlorothiazide (HYZAAR) 50-12.5 MG per tablet, Take 1 tablet by mouth Daily., Disp: 30 tablet, Rfl: 5      The following portions of the patient's history were reviewed and updated as appropriate: allergies, current medications, past family history, past medical history, past  "social history, past surgical history and problem list.    Social History   Substance Use Topics   • Smoking status: Never Smoker   • Smokeless tobacco: Never Used   • Alcohol use No       Review of Systems   Constitution: Negative for chills and fever.   HENT: Negative for nosebleeds and sore throat.    Cardiovascular: Positive for leg swelling and palpitations. Negative for chest pain and syncope.   Respiratory: Negative for cough, hemoptysis, shortness of breath and wheezing.    Gastrointestinal: Negative for abdominal pain, hematemesis, hematochezia, melena, nausea and vomiting.   Genitourinary: Negative for dysuria and hematuria.   Neurological: Positive for dizziness. Negative for headaches.       Objective   Vitals:    01/22/18 1604   BP: (!) 181/93   BP Location: Right arm   Pulse: 86   Resp: 16   Weight: 62.2 kg (137 lb 3.2 oz)   Height: 154.4 cm (60.79\")     Body mass index is 26.11 kg/(m^2).        Physical Exam   Constitutional: She is oriented to person, place, and time. She appears well-developed and well-nourished.   HENT:   Head: Normocephalic.   Eyes: Conjunctivae and EOM are normal.   Neck: Normal range of motion. Neck supple. No JVD present. No tracheal deviation present. No thyromegaly present.   Cardiovascular: Normal rate and regular rhythm.  Exam reveals no gallop and no friction rub.    No murmur heard.  Pulmonary/Chest: Breath sounds normal. No respiratory distress. She has no wheezes. She has no rales.   Abdominal: Soft. Bowel sounds are normal. She exhibits no mass. There is no tenderness.   Musculoskeletal: She exhibits no edema.   Neurological: She is alert and oriented to person, place, and time. No cranial nerve deficit.   Skin: Skin is warm and dry.   Psychiatric: She has a normal mood and affect.       Lab Results   Component Value Date     05/25/2017    K 3.9 05/25/2017     05/25/2017    CO2 28.5 05/25/2017    BUN 10 05/25/2017    CREATININE 0.78 05/25/2017    GLUCOSE " 109 05/25/2017    CALCIUM 9.6 05/25/2017    AST 23 05/25/2017    ALT 21 05/25/2017    ALKPHOS 95 05/25/2017    LABIL2 1.3 (L) 05/25/2017     Lab Results   Component Value Date    CKTOTAL 74 05/25/2017     Lab Results   Component Value Date    WBC 8.10 05/25/2017    HGB 14.5 05/25/2017    HCT 43.2 05/25/2017     05/25/2017     Lab Results   Component Value Date    INR 0.89 05/25/2017       Lab Results   Component Value Date    BNP 26.0 05/25/2017     Echo   No results found for: ECHOEFEST    ECG 12 Lead  Date/Time: 1/22/2018 4:15 PM  Performed by: CORNELL PACK  Authorized by: CORNELL PACK   Rhythm: sinus rhythm  Conduction: conduction normal  ST Segments: ST segments normal  T Waves: T waves normal  Clinical impression: normal ECG            Assessment/Plan    Diagnosis Plan   1. Precordial pain, probably noncardiac.      2. Essential hypertension , Uncontrolled     3. Mixed hyperlipidemia          Recommendations:  1.  We will change the losartan to losartan /12.5 mg daily for elevated blood pressure.  2. Continue to keep a check on the blood pressure at home twice a day.  I'll if blood pressure is running more than 150 on the top or more than 90 on the bottom.    Return in about 5 months (around 6/22/2018).    As always, I appreciate very much the opportunity to participate in the cardiovascular care of your patients.      With Best Regards,    Cornell Pack MD, [facility]    Dragon disclaimer:  Much of this encounter note is an electronic transcription/translation of spoken language to printed text. The electronic translation of spoken language may permit erroneous, or at times, nonsensical words or phrases to be inadvertently transcribed; Although I have reviewed the note for such errors, some may still exist.

## 2018-02-20 ENCOUNTER — TELEPHONE (OUTPATIENT)
Dept: CARDIOLOGY | Facility: CLINIC | Age: 53
End: 2018-02-20

## 2018-02-20 NOTE — TELEPHONE ENCOUNTER
"Pt called asking again if the \"water pill\" may be stopped due to it flaring her gout.  I explained that her original question was sent to Cathleen, however she is in clinic seeing patients at the moment and it will be a bit before she can review her messages.  I let her know as soon as we hear back from Cathleen, we will call pt to advise.  Pt expressed understanding.  Pt requests call to home phone first, then if no answer, please call cell.   "

## 2018-02-20 NOTE — TELEPHONE ENCOUNTER
Diana called and stated that since the water pill was added to her Losartan that it has made her Gout act up in her Left knee. She wanted to know if the water pill could be took out of her Losartan.

## 2018-02-21 RX ORDER — LOSARTAN POTASSIUM 100 MG/1
100 TABLET ORAL DAILY
Qty: 30 TABLET | Refills: 5 | Status: ON HOLD | OUTPATIENT
Start: 2018-02-21 | End: 2018-03-04

## 2018-02-21 NOTE — TELEPHONE ENCOUNTER
Advised pt per Cathleen, she may go back to taking Losartan 100 mg daily, and stop HCTZ.  She noted it was a combined pill, so I will send her in a new RX for Losartan 100 mg daily.      When I asked pt about the Metoprolol, she stated she remembers the med making her HR drop too low and her BP was unstable.  She said it was stopped sometime last summer due to this.

## 2018-02-21 NOTE — TELEPHONE ENCOUNTER
Okay to go back to losartan 100 mg daily and discontinue hydrochlorothiazide.    Previously intolerant to amlodipine secondary to pain in the legs and swelling.    She was also previously on metoprolol tartrate initially at 25 mg, then 50 mg, then 37.5 mg.  After this it was discontinued.  Please see why her metoprolol was discontinued altogether?

## 2018-03-04 ENCOUNTER — APPOINTMENT (OUTPATIENT)
Dept: GENERAL RADIOLOGY | Facility: HOSPITAL | Age: 53
End: 2018-03-04

## 2018-03-04 ENCOUNTER — HOSPITAL ENCOUNTER (OUTPATIENT)
Facility: HOSPITAL | Age: 53
Setting detail: OBSERVATION
Discharge: HOME | End: 2018-03-05
Attending: EMERGENCY MEDICINE | Admitting: INTERNAL MEDICINE

## 2018-03-04 DIAGNOSIS — R07.9 CHEST PAIN, UNSPECIFIED TYPE: Primary | ICD-10-CM

## 2018-03-04 LAB
ALBUMIN SERPL-MCNC: 4.2 G/DL (ref 3.5–5)
ALBUMIN/GLOB SERPL: 1.6 G/DL (ref 1.5–2.5)
ALP SERPL-CCNC: 70 U/L (ref 35–104)
ALT SERPL W P-5'-P-CCNC: 28 U/L (ref 10–36)
ANION GAP SERPL CALCULATED.3IONS-SCNC: 4.3 MMOL/L (ref 3.6–11.2)
AST SERPL-CCNC: 40 U/L (ref 10–30)
BASOPHILS # BLD AUTO: 0.02 10*3/MM3 (ref 0–0.3)
BASOPHILS NFR BLD AUTO: 0.3 % (ref 0–2)
BILIRUB SERPL-MCNC: 0.5 MG/DL (ref 0.2–1.8)
BUN BLD-MCNC: 12 MG/DL (ref 7–21)
BUN/CREAT SERPL: 16 (ref 7–25)
CALCIUM SPEC-SCNC: 9.3 MG/DL (ref 7.7–10)
CHLORIDE SERPL-SCNC: 105 MMOL/L (ref 99–112)
CK MB SERPL-CCNC: 1.53 NG/ML (ref 0–5)
CK MB SERPL-RTO: 1.9 % (ref 0–3)
CK SERPL-CCNC: 81 U/L (ref 24–173)
CO2 SERPL-SCNC: 28.7 MMOL/L (ref 24.3–31.9)
CREAT BLD-MCNC: 0.75 MG/DL (ref 0.43–1.29)
DEPRECATED RDW RBC AUTO: 37.9 FL (ref 37–54)
EOSINOPHIL # BLD AUTO: 0.08 10*3/MM3 (ref 0–0.7)
EOSINOPHIL NFR BLD AUTO: 1.4 % (ref 0–5)
ERYTHROCYTE [DISTWIDTH] IN BLOOD BY AUTOMATED COUNT: 12.3 % (ref 11.5–14.5)
GFR SERPL CREATININE-BSD FRML MDRD: 81 ML/MIN/1.73
GLOBULIN UR ELPH-MCNC: 2.7 GM/DL
GLUCOSE BLD-MCNC: 100 MG/DL (ref 70–110)
HBA1C MFR BLD: 5.2 % (ref 4.5–5.7)
HCT VFR BLD AUTO: 41 % (ref 37–47)
HGB BLD-MCNC: 13.8 G/DL (ref 12–16)
HOLD SPECIMEN: NORMAL
HOLD SPECIMEN: NORMAL
IMM GRANULOCYTES # BLD: 0 10*3/MM3 (ref 0–0.03)
IMM GRANULOCYTES NFR BLD: 0 % (ref 0–0.5)
INR PPP: 0.89 (ref 0.9–1.1)
LYMPHOCYTES # BLD AUTO: 1.65 10*3/MM3 (ref 1–3)
LYMPHOCYTES NFR BLD AUTO: 28.8 % (ref 21–51)
MCH RBC QN AUTO: 29.1 PG (ref 27–33)
MCHC RBC AUTO-ENTMCNC: 33.7 G/DL (ref 33–37)
MCV RBC AUTO: 86.3 FL (ref 80–94)
MONOCYTES # BLD AUTO: 0.48 10*3/MM3 (ref 0.1–0.9)
MONOCYTES NFR BLD AUTO: 8.4 % (ref 0–10)
MYOGLOBIN SERPL-MCNC: 45 NG/ML (ref 0–109)
NEUTROPHILS # BLD AUTO: 3.5 10*3/MM3 (ref 1.4–6.5)
NEUTROPHILS NFR BLD AUTO: 61.1 % (ref 30–70)
OSMOLALITY SERPL CALC.SUM OF ELEC: 275.5 MOSM/KG (ref 273–305)
PLATELET # BLD AUTO: 262 10*3/MM3 (ref 130–400)
PMV BLD AUTO: 10.7 FL (ref 6–10)
POTASSIUM BLD-SCNC: 4.3 MMOL/L (ref 3.5–5.3)
PROT SERPL-MCNC: 6.9 G/DL (ref 6–8)
PROTHROMBIN TIME: 12.1 SECONDS (ref 11–15.4)
RBC # BLD AUTO: 4.75 10*6/MM3 (ref 4.2–5.4)
SODIUM BLD-SCNC: 138 MMOL/L (ref 135–153)
TROPONIN I SERPL-MCNC: <0.006 NG/ML
WBC NRBC COR # BLD: 5.73 10*3/MM3 (ref 4.5–12.5)
WHOLE BLOOD HOLD SPECIMEN: NORMAL
WHOLE BLOOD HOLD SPECIMEN: NORMAL

## 2018-03-04 PROCEDURE — 36415 COLL VENOUS BLD VENIPUNCTURE: CPT

## 2018-03-04 PROCEDURE — 82550 ASSAY OF CK (CPK): CPT | Performed by: INTERNAL MEDICINE

## 2018-03-04 PROCEDURE — 80053 COMPREHEN METABOLIC PANEL: CPT | Performed by: EMERGENCY MEDICINE

## 2018-03-04 PROCEDURE — 93005 ELECTROCARDIOGRAM TRACING: CPT | Performed by: EMERGENCY MEDICINE

## 2018-03-04 PROCEDURE — 99285 EMERGENCY DEPT VISIT HI MDM: CPT

## 2018-03-04 PROCEDURE — G0378 HOSPITAL OBSERVATION PER HR: HCPCS

## 2018-03-04 PROCEDURE — 83874 ASSAY OF MYOGLOBIN: CPT | Performed by: INTERNAL MEDICINE

## 2018-03-04 PROCEDURE — 93010 ELECTROCARDIOGRAM REPORT: CPT | Performed by: INTERNAL MEDICINE

## 2018-03-04 PROCEDURE — 99220 PR INITIAL OBSERVATION CARE/DAY 70 MINUTES: CPT | Performed by: INTERNAL MEDICINE

## 2018-03-04 PROCEDURE — 85025 COMPLETE CBC W/AUTO DIFF WBC: CPT | Performed by: EMERGENCY MEDICINE

## 2018-03-04 PROCEDURE — 84484 ASSAY OF TROPONIN QUANT: CPT | Performed by: INTERNAL MEDICINE

## 2018-03-04 PROCEDURE — 83036 HEMOGLOBIN GLYCOSYLATED A1C: CPT | Performed by: INTERNAL MEDICINE

## 2018-03-04 PROCEDURE — 84484 ASSAY OF TROPONIN QUANT: CPT | Performed by: EMERGENCY MEDICINE

## 2018-03-04 PROCEDURE — 82553 CREATINE MB FRACTION: CPT | Performed by: INTERNAL MEDICINE

## 2018-03-04 PROCEDURE — 71046 X-RAY EXAM CHEST 2 VIEWS: CPT | Performed by: RADIOLOGY

## 2018-03-04 PROCEDURE — 71046 X-RAY EXAM CHEST 2 VIEWS: CPT

## 2018-03-04 PROCEDURE — 85610 PROTHROMBIN TIME: CPT | Performed by: EMERGENCY MEDICINE

## 2018-03-04 RX ORDER — SODIUM CHLORIDE 0.9 % (FLUSH) 0.9 %
10 SYRINGE (ML) INJECTION AS NEEDED
Status: DISCONTINUED | OUTPATIENT
Start: 2018-03-04 | End: 2018-03-05 | Stop reason: HOSPADM

## 2018-03-04 RX ORDER — HEPARIN SODIUM 5000 [USP'U]/ML
5000 INJECTION, SOLUTION INTRAVENOUS; SUBCUTANEOUS EVERY 12 HOURS SCHEDULED
Status: DISCONTINUED | OUTPATIENT
Start: 2018-03-04 | End: 2018-03-05 | Stop reason: HOSPADM

## 2018-03-04 RX ORDER — PROPRANOLOL HYDROCHLORIDE 20 MG/1
20 TABLET ORAL DAILY
Status: DISCONTINUED | OUTPATIENT
Start: 2018-03-05 | End: 2018-03-05

## 2018-03-04 RX ORDER — NITROGLYCERIN 0.4 MG/1
0.4 TABLET SUBLINGUAL
Status: DISCONTINUED | OUTPATIENT
Start: 2018-03-04 | End: 2018-03-05 | Stop reason: HOSPADM

## 2018-03-04 RX ORDER — LORATADINE 10 MG/1
10 TABLET ORAL DAILY
COMMUNITY
End: 2018-05-17

## 2018-03-04 RX ORDER — PENICILLIN V POTASSIUM 500 MG/1
500 TABLET ORAL 2 TIMES DAILY
COMMUNITY
End: 2018-05-17

## 2018-03-04 RX ORDER — ASPIRIN 325 MG
325 TABLET ORAL ONCE
Status: COMPLETED | OUTPATIENT
Start: 2018-03-04 | End: 2018-03-04

## 2018-03-04 RX ORDER — PENICILLIN V POTASSIUM 500 MG/1
500 TABLET ORAL
Status: DISCONTINUED | OUTPATIENT
Start: 2018-03-05 | End: 2018-03-05 | Stop reason: HOSPADM

## 2018-03-04 RX ORDER — ATORVASTATIN CALCIUM 20 MG/1
20 TABLET, FILM COATED ORAL NIGHTLY
Status: DISCONTINUED | OUTPATIENT
Start: 2018-03-05 | End: 2018-03-05

## 2018-03-04 RX ORDER — CETIRIZINE HYDROCHLORIDE 10 MG/1
10 TABLET ORAL DAILY
Status: DISCONTINUED | OUTPATIENT
Start: 2018-03-05 | End: 2018-03-05 | Stop reason: HOSPADM

## 2018-03-04 RX ORDER — PROPRANOLOL HYDROCHLORIDE 20 MG/1
20 TABLET ORAL DAILY
COMMUNITY
End: 2018-03-05 | Stop reason: HOSPADM

## 2018-03-04 RX ORDER — ASCORBIC ACID, THIAMINE MONONITRATE,RIBOFLAVIN, NIACINAMIDE, PYRIDOXINE HYDROCHLORIDE, FOLIC ACID, CYANOCOBALAMIN, BIOTIN, CALCIUM PANTOTHENATE, 100; 1.5; 1.7; 20; 10; 1; 6000; 150000; 5 MG/1; MG/1; MG/1; MG/1; MG/1; MG/1; UG/1; UG/1; MG/1
1 CAPSULE, LIQUID FILLED ORAL DAILY
Status: DISCONTINUED | OUTPATIENT
Start: 2018-03-05 | End: 2018-03-05 | Stop reason: HOSPADM

## 2018-03-04 RX ORDER — SODIUM CHLORIDE 0.9 % (FLUSH) 0.9 %
1-10 SYRINGE (ML) INJECTION AS NEEDED
Status: DISCONTINUED | OUTPATIENT
Start: 2018-03-04 | End: 2018-03-05 | Stop reason: HOSPADM

## 2018-03-04 RX ADMIN — ATORVASTATIN CALCIUM 20 MG: 20 TABLET, FILM COATED ORAL at 23:26

## 2018-03-04 RX ADMIN — PENICILLIN V POTASSIUM 500 MG: 500 TABLET ORAL at 23:26

## 2018-03-04 RX ADMIN — ASPIRIN 325 MG: 325 TABLET ORAL at 16:14

## 2018-03-04 NOTE — ED PROVIDER NOTES
Subjective   HPI Comments: 52-year-old female presents with elevated blood pressure at home.  Patient reports her blood pressure was a systolic of 170 at home.  She states that she became nervous and drove herself to the emergency room.  While she was driving she developed an ache in her left arm, with some mild achy left-sided chest pain.  She states she has had pain like this before.  She reports no history of ACS previously.  She has had a normal stress test 2 years ago.  The patient recently was started on a new blood pressure medicine, propranolol.  She reports she does have a family history of cardiac disease although it is not early cardiac disease.  She states she is a nonsmoker.  Has history of hypercholesterolemia and hypertension.  The patient is currently pain-free.  She reports no history of DVT or PE.  She denies any shortness of breath.  Denies nausea or diaphoresis.      Review of Systems   Constitutional: Negative for chills and fever.   HENT: Negative for congestion.    Eyes: Negative.    Respiratory: Negative for cough and shortness of breath.    Cardiovascular: Positive for chest pain. Negative for leg swelling.   Gastrointestinal: Negative for nausea and vomiting.   Genitourinary: Negative.    Musculoskeletal: Negative for myalgias and neck pain.   Skin: Negative for pallor and rash.   Neurological: Negative for dizziness and light-headedness.   Psychiatric/Behavioral: The patient is nervous/anxious.        Past Medical History:   Diagnosis Date   • Chest pain    • Hyperlipidemia    • Hypertension        No Known Allergies    Past Surgical History:   Procedure Laterality Date   • CHOLECYSTECTOMY     • FOOT SURGERY      s/p clubfoot, R       Family History   Problem Relation Age of Onset   • Heart attack Mother    • Heart attack Father    • Heart attack Maternal Aunt    • Heart attack Maternal Uncle    • Heart attack Paternal Aunt    • Heart attack Paternal Uncle    • Heart attack Maternal  Grandmother    • Heart attack Maternal Grandfather    • Heart attack Paternal Grandmother    • Heart attack Paternal Grandfather        Social History     Social History   • Marital status: Single     Social History Main Topics   • Smoking status: Never Smoker   • Smokeless tobacco: Never Used   • Alcohol use No   • Drug use: No           Objective   Physical Exam   Constitutional: She is oriented to person, place, and time. She appears well-developed and well-nourished.   HENT:   Head: Normocephalic.   Right Ear: External ear normal.   Left Ear: External ear normal.   Eyes: Conjunctivae are normal.   Neck: Normal range of motion.   Cardiovascular: Normal rate, regular rhythm and normal heart sounds.    Pulmonary/Chest: Effort normal.   Abdominal: Soft. There is no rebound and no guarding.   Musculoskeletal: Normal range of motion.   Neurological: She is alert and oriented to person, place, and time.   Skin: Skin is warm and dry.       Procedures         ED Course  ED Course   Value Comment By Time   ECG 12 Lead Sinus rhythm, rate is 77.  There are nonspecific ST abnormalities, borderline depression noted, this was seen previously in several other EKGs.  There is no STEMI. Tamra Arnold, DO 03/04 1548      EKG #2, taken at 1651  Normal sinus rhythm, the rate is 61.  The ST depression seen previously seems improved.  Normal intervals.  The axis is normal.  No STEMI.  EKG appears improved from prior          HEART Score (for prediction of 6-week risk of major adverse cardiac event) reviewed and/or performed as part of the patient evaluation and treatment planning process.  The result associated with this review/performance is: 4   one point for std, one point for age, 2 for risk factors     MDM  Number of Diagnoses or Management Options  Chest pain, unspecified type:       Final diagnoses:   Chest pain, unspecified type            Tamra Arnold DO  03/05/18 0005

## 2018-03-04 NOTE — ED NOTES
Cardiac diet ordered from cafeteria per ok by Dr. Arnold.   Cafeteria notified by Portillo (ER tech).      Sushma Cain RN  03/04/18 0785

## 2018-03-04 NOTE — ED NOTES
Pt hob elevated.  Pt eating dinner tray at this time with no difficulty.      Sushma Cain RN  03/04/18 1534

## 2018-03-05 ENCOUNTER — APPOINTMENT (OUTPATIENT)
Dept: NUCLEAR MEDICINE | Facility: HOSPITAL | Age: 53
End: 2018-03-05
Attending: INTERNAL MEDICINE

## 2018-03-05 ENCOUNTER — TELEPHONE (OUTPATIENT)
Dept: CARDIOLOGY | Facility: CLINIC | Age: 53
End: 2018-03-05

## 2018-03-05 ENCOUNTER — APPOINTMENT (OUTPATIENT)
Dept: CARDIOLOGY | Facility: HOSPITAL | Age: 53
End: 2018-03-05
Attending: INTERNAL MEDICINE

## 2018-03-05 VITALS
HEART RATE: 82 BPM | SYSTOLIC BLOOD PRESSURE: 115 MMHG | BODY MASS INDEX: 25.91 KG/M2 | DIASTOLIC BLOOD PRESSURE: 66 MMHG | OXYGEN SATURATION: 97 % | RESPIRATION RATE: 14 BRPM | HEIGHT: 60 IN | WEIGHT: 132 LBS | TEMPERATURE: 98.3 F

## 2018-03-05 LAB
6-ACETYL MORPHINE: NEGATIVE
ALBUMIN SERPL-MCNC: 3.7 G/DL (ref 3.5–5)
ALBUMIN/GLOB SERPL: 1.5 G/DL (ref 1.5–2.5)
ALP SERPL-CCNC: 64 U/L (ref 35–104)
ALT SERPL W P-5'-P-CCNC: 25 U/L (ref 10–36)
AMPHET+METHAMPHET UR QL: NEGATIVE
ANION GAP SERPL CALCULATED.3IONS-SCNC: 4.9 MMOL/L (ref 3.6–11.2)
AST SERPL-CCNC: 27 U/L (ref 10–30)
BARBITURATES UR QL SCN: NEGATIVE
BASOPHILS # BLD AUTO: 0.02 10*3/MM3 (ref 0–0.3)
BASOPHILS NFR BLD AUTO: 0.3 % (ref 0–2)
BENZODIAZ UR QL SCN: NEGATIVE
BH CV ECHO MEAS - % IVS THICK: -19.1 %
BH CV ECHO MEAS - % LVPW THICK: 21.3 %
BH CV ECHO MEAS - ACS: 1.6 CM
BH CV ECHO MEAS - AO MAX PG: 13.2 MMHG
BH CV ECHO MEAS - AO MEAN PG: 6.6 MMHG
BH CV ECHO MEAS - AO ROOT AREA (BSA CORRECTED): 1.4
BH CV ECHO MEAS - AO ROOT AREA: 3.7 CM^2
BH CV ECHO MEAS - AO ROOT DIAM: 2.2 CM
BH CV ECHO MEAS - AO V2 MAX: 181.9 CM/SEC
BH CV ECHO MEAS - AO V2 MEAN: 122.3 CM/SEC
BH CV ECHO MEAS - AO V2 VTI: 45.7 CM
BH CV ECHO MEAS - BSA(HAYCOCK): 1.6 M^2
BH CV ECHO MEAS - BSA: 1.6 M^2
BH CV ECHO MEAS - BZI_BMI: 25.8 KILOGRAMS/M^2
BH CV ECHO MEAS - BZI_METRIC_HEIGHT: 152.4 CM
BH CV ECHO MEAS - BZI_METRIC_WEIGHT: 59.9 KG
BH CV ECHO MEAS - CONTRAST EF 4CH: 64.9 ML/M^2
BH CV ECHO MEAS - EDV(CUBED): 48.1 ML
BH CV ECHO MEAS - EDV(MOD-SP4): 37 ML
BH CV ECHO MEAS - EDV(TEICH): 55.8 ML
BH CV ECHO MEAS - EF(CUBED): 61.4 %
BH CV ECHO MEAS - EF(MOD-SP4): 64.9 %
BH CV ECHO MEAS - EF(TEICH): 53.8 %
BH CV ECHO MEAS - ESV(CUBED): 18.6 ML
BH CV ECHO MEAS - ESV(MOD-SP4): 13 ML
BH CV ECHO MEAS - ESV(TEICH): 25.8 ML
BH CV ECHO MEAS - FS: 27.2 %
BH CV ECHO MEAS - IVS/LVPW: 1.1
BH CV ECHO MEAS - IVSD: 1.1 CM
BH CV ECHO MEAS - IVSS: 0.86 CM
BH CV ECHO MEAS - LA DIMENSION: 2.4 CM
BH CV ECHO MEAS - LA/AO: 1.1
BH CV ECHO MEAS - LV DIASTOLIC VOL/BSA (35-75): 23.7 ML/M^2
BH CV ECHO MEAS - LV MASS(C)D: 110.8 GRAMS
BH CV ECHO MEAS - LV MASS(C)DI: 70.8 GRAMS/M^2
BH CV ECHO MEAS - LV MASS(C)S: 69.6 GRAMS
BH CV ECHO MEAS - LV MASS(C)SI: 44.5 GRAMS/M^2
BH CV ECHO MEAS - LV SYSTOLIC VOL/BSA (12-30): 8.3 ML/M^2
BH CV ECHO MEAS - LVIDD: 3.6 CM
BH CV ECHO MEAS - LVIDS: 2.6 CM
BH CV ECHO MEAS - LVLD AP4: 5.6 CM
BH CV ECHO MEAS - LVLS AP4: 4.9 CM
BH CV ECHO MEAS - LVOT AREA (M): 2.5 CM^2
BH CV ECHO MEAS - LVOT AREA: 2.5 CM^2
BH CV ECHO MEAS - LVOT DIAM: 1.8 CM
BH CV ECHO MEAS - LVPWD: 0.96 CM
BH CV ECHO MEAS - LVPWS: 1.2 CM
BH CV ECHO MEAS - MV A MAX VEL: 57.3 CM/SEC
BH CV ECHO MEAS - MV E MAX VEL: 83.4 CM/SEC
BH CV ECHO MEAS - MV E/A: 1.5
BH CV ECHO MEAS - PA ACC SLOPE: 1028 CM/SEC^2
BH CV ECHO MEAS - PA ACC TIME: 0.11 SEC
BH CV ECHO MEAS - PA PR(ACCEL): 31.5 MMHG
BH CV ECHO MEAS - RAP SYSTOLE: 10 MMHG
BH CV ECHO MEAS - RVDD: 1.2 CM
BH CV ECHO MEAS - RVSP: 34.1 MMHG
BH CV ECHO MEAS - SI(AO): 107.8 ML/M^2
BH CV ECHO MEAS - SI(CUBED): 18.9 ML/M^2
BH CV ECHO MEAS - SI(MOD-SP4): 15.3 ML/M^2
BH CV ECHO MEAS - SI(TEICH): 19.2 ML/M^2
BH CV ECHO MEAS - SV(AO): 168.6 ML
BH CV ECHO MEAS - SV(CUBED): 29.6 ML
BH CV ECHO MEAS - SV(MOD-SP4): 24 ML
BH CV ECHO MEAS - SV(TEICH): 30.1 ML
BH CV ECHO MEAS - TR MAX VEL: 245.7 CM/SEC
BH CV NUCLEAR PRIOR STUDY: 3
BH CV STRESS BP STAGE 1: NORMAL
BH CV STRESS BP STAGE 2: NORMAL
BH CV STRESS COMMENTS STAGE 1: NORMAL
BH CV STRESS COMMENTS STAGE 2: NORMAL
BH CV STRESS DOSE REGADENOSON STAGE 1: 0.4
BH CV STRESS DURATION MIN STAGE 1: 0
BH CV STRESS DURATION MIN STAGE 2: 4
BH CV STRESS DURATION SEC STAGE 1: 10
BH CV STRESS DURATION SEC STAGE 2: 0
BH CV STRESS HR STAGE 1: 71
BH CV STRESS HR STAGE 2: 94
BH CV STRESS PROTOCOL 1: NORMAL
BH CV STRESS RECOVERY BP: NORMAL MMHG
BH CV STRESS RECOVERY HR: 91 BPM
BH CV STRESS STAGE 1: 1
BH CV STRESS STAGE 2: 2
BILIRUB SERPL-MCNC: 0.3 MG/DL (ref 0.2–1.8)
BILIRUB UR QL STRIP: NEGATIVE
BUN BLD-MCNC: 16 MG/DL (ref 7–21)
BUN/CREAT SERPL: 20 (ref 7–25)
BUPRENORPHINE SERPL-MCNC: NEGATIVE NG/ML
CALCIUM SPEC-SCNC: 9 MG/DL (ref 7.7–10)
CANNABINOIDS SERPL QL: NEGATIVE
CHLORIDE SERPL-SCNC: 108 MMOL/L (ref 99–112)
CHOLEST SERPL-MCNC: 186 MG/DL (ref 0–200)
CK MB SERPL-CCNC: 0.63 NG/ML (ref 0–5)
CK MB SERPL-CCNC: 1.08 NG/ML (ref 0–5)
CK MB SERPL-RTO: 1 % (ref 0–3)
CK MB SERPL-RTO: 1.7 % (ref 0–3)
CK SERPL-CCNC: 61 U/L (ref 24–173)
CK SERPL-CCNC: 64 U/L (ref 24–173)
CLARITY UR: CLEAR
CO2 SERPL-SCNC: 29.1 MMOL/L (ref 24.3–31.9)
COCAINE UR QL: NEGATIVE
COLOR UR: YELLOW
CREAT BLD-MCNC: 0.8 MG/DL (ref 0.43–1.29)
DEPRECATED RDW RBC AUTO: 39.1 FL (ref 37–54)
EOSINOPHIL # BLD AUTO: 0.14 10*3/MM3 (ref 0–0.7)
EOSINOPHIL NFR BLD AUTO: 2 % (ref 0–5)
ERYTHROCYTE [DISTWIDTH] IN BLOOD BY AUTOMATED COUNT: 12.5 % (ref 11.5–14.5)
GFR SERPL CREATININE-BSD FRML MDRD: 75 ML/MIN/1.73
GLOBULIN UR ELPH-MCNC: 2.5 GM/DL
GLUCOSE BLD-MCNC: 99 MG/DL (ref 70–110)
GLUCOSE UR STRIP-MCNC: NEGATIVE MG/DL
HCT VFR BLD AUTO: 39.9 % (ref 37–47)
HDLC SERPL-MCNC: 43 MG/DL (ref 60–100)
HGB BLD-MCNC: 13.3 G/DL (ref 12–16)
HGB UR QL STRIP.AUTO: NEGATIVE
IMM GRANULOCYTES # BLD: 0 10*3/MM3 (ref 0–0.03)
IMM GRANULOCYTES NFR BLD: 0 % (ref 0–0.5)
KETONES UR QL STRIP: NEGATIVE
LDLC SERPL CALC-MCNC: 124 MG/DL (ref 0–100)
LDLC/HDLC SERPL: 2.89 {RATIO}
LEUKOCYTE ESTERASE UR QL STRIP.AUTO: NEGATIVE
LV EF 2D ECHO EST: 65 %
LV EF NUC BP: 75 %
LYMPHOCYTES # BLD AUTO: 2.78 10*3/MM3 (ref 1–3)
LYMPHOCYTES NFR BLD AUTO: 40.6 % (ref 21–51)
MAXIMAL PREDICTED HEART RATE: 166 BPM
MCH RBC QN AUTO: 29.2 PG (ref 27–33)
MCHC RBC AUTO-ENTMCNC: 33.3 G/DL (ref 33–37)
MCV RBC AUTO: 87.5 FL (ref 80–94)
METHADONE UR QL SCN: NEGATIVE
MONOCYTES # BLD AUTO: 0.75 10*3/MM3 (ref 0.1–0.9)
MONOCYTES NFR BLD AUTO: 10.9 % (ref 0–10)
MYOGLOBIN SERPL-MCNC: 28 NG/ML (ref 0–109)
MYOGLOBIN SERPL-MCNC: 30 NG/ML (ref 0–109)
NEUTROPHILS # BLD AUTO: 3.16 10*3/MM3 (ref 1.4–6.5)
NEUTROPHILS NFR BLD AUTO: 46.2 % (ref 30–70)
NITRITE UR QL STRIP: NEGATIVE
OPIATES UR QL: NEGATIVE
OSMOLALITY SERPL CALC.SUM OF ELEC: 284.3 MOSM/KG (ref 273–305)
OXYCODONE UR QL SCN: NEGATIVE
PCP UR QL SCN: NEGATIVE
PERCENT MAX PREDICTED HR: 73.49 %
PH UR STRIP.AUTO: 6.5 [PH] (ref 5–8)
PLATELET # BLD AUTO: 261 10*3/MM3 (ref 130–400)
PMV BLD AUTO: 10.9 FL (ref 6–10)
POTASSIUM BLD-SCNC: 4 MMOL/L (ref 3.5–5.3)
PROT SERPL-MCNC: 6.2 G/DL (ref 6–8)
PROT UR QL STRIP: NEGATIVE
RBC # BLD AUTO: 4.56 10*6/MM3 (ref 4.2–5.4)
SODIUM BLD-SCNC: 142 MMOL/L (ref 135–153)
SP GR UR STRIP: 1.01 (ref 1–1.03)
STRESS BASELINE BP: NORMAL MMHG
STRESS BASELINE HR: 72 BPM
STRESS PERCENT HR: 86 %
STRESS POST PEAK BP: NORMAL MMHG
STRESS POST PEAK HR: 122 BPM
STRESS TARGET HR: 141 BPM
TRIGL SERPL-MCNC: 93 MG/DL (ref 0–150)
TROPONIN I SERPL-MCNC: <0.006 NG/ML
TROPONIN I SERPL-MCNC: <0.006 NG/ML
UROBILINOGEN UR QL STRIP: NORMAL
VLDLC SERPL-MCNC: 18.6 MG/DL
WBC NRBC COR # BLD: 6.85 10*3/MM3 (ref 4.5–12.5)

## 2018-03-05 PROCEDURE — 80307 DRUG TEST PRSMV CHEM ANLYZR: CPT | Performed by: INTERNAL MEDICINE

## 2018-03-05 PROCEDURE — 99214 OFFICE O/P EST MOD 30 MIN: CPT | Performed by: INTERNAL MEDICINE

## 2018-03-05 PROCEDURE — 25010000002 REGADENOSON 0.4 MG/5ML SOLUTION: Performed by: INTERNAL MEDICINE

## 2018-03-05 PROCEDURE — 82550 ASSAY OF CK (CPK): CPT | Performed by: INTERNAL MEDICINE

## 2018-03-05 PROCEDURE — G0378 HOSPITAL OBSERVATION PER HR: HCPCS

## 2018-03-05 PROCEDURE — 83874 ASSAY OF MYOGLOBIN: CPT | Performed by: INTERNAL MEDICINE

## 2018-03-05 PROCEDURE — 84484 ASSAY OF TROPONIN QUANT: CPT | Performed by: INTERNAL MEDICINE

## 2018-03-05 PROCEDURE — 99225 PR SBSQ OBSERVATION CARE/DAY 25 MINUTES: CPT | Performed by: INTERNAL MEDICINE

## 2018-03-05 PROCEDURE — 93018 CV STRESS TEST I&R ONLY: CPT | Performed by: INTERNAL MEDICINE

## 2018-03-05 PROCEDURE — 80053 COMPREHEN METABOLIC PANEL: CPT | Performed by: INTERNAL MEDICINE

## 2018-03-05 PROCEDURE — 94799 UNLISTED PULMONARY SVC/PX: CPT

## 2018-03-05 PROCEDURE — 78452 HT MUSCLE IMAGE SPECT MULT: CPT

## 2018-03-05 PROCEDURE — 85025 COMPLETE CBC W/AUTO DIFF WBC: CPT | Performed by: INTERNAL MEDICINE

## 2018-03-05 PROCEDURE — 78452 HT MUSCLE IMAGE SPECT MULT: CPT | Performed by: INTERNAL MEDICINE

## 2018-03-05 PROCEDURE — 80061 LIPID PANEL: CPT | Performed by: INTERNAL MEDICINE

## 2018-03-05 PROCEDURE — A9500 TC99M SESTAMIBI: HCPCS | Performed by: INTERNAL MEDICINE

## 2018-03-05 PROCEDURE — 93306 TTE W/DOPPLER COMPLETE: CPT | Performed by: INTERNAL MEDICINE

## 2018-03-05 PROCEDURE — 0 TECHNETIUM SESTAMIBI: Performed by: INTERNAL MEDICINE

## 2018-03-05 PROCEDURE — 81003 URINALYSIS AUTO W/O SCOPE: CPT | Performed by: INTERNAL MEDICINE

## 2018-03-05 PROCEDURE — 82553 CREATINE MB FRACTION: CPT | Performed by: INTERNAL MEDICINE

## 2018-03-05 PROCEDURE — 93017 CV STRESS TEST TRACING ONLY: CPT

## 2018-03-05 PROCEDURE — 93306 TTE W/DOPPLER COMPLETE: CPT

## 2018-03-05 RX ORDER — CARVEDILOL 3.12 MG/1
3.12 TABLET ORAL EVERY 12 HOURS SCHEDULED
Qty: 60 TABLET | Refills: 0 | Status: SHIPPED | OUTPATIENT
Start: 2018-03-05 | End: 2018-03-26

## 2018-03-05 RX ORDER — FLUTICASONE PROPIONATE 50 MCG
2 SPRAY, SUSPENSION (ML) NASAL DAILY
COMMUNITY
End: 2018-07-18 | Stop reason: HOSPADM

## 2018-03-05 RX ORDER — L.ACID,PARA/B.BIFIDUM/S.THERM 8B CELL
1 CAPSULE ORAL DAILY
Status: DISCONTINUED | OUTPATIENT
Start: 2018-03-05 | End: 2018-03-05 | Stop reason: HOSPADM

## 2018-03-05 RX ORDER — FLUTICASONE PROPIONATE 50 MCG
2 SPRAY, SUSPENSION (ML) NASAL DAILY
Status: CANCELLED | OUTPATIENT
Start: 2018-03-05

## 2018-03-05 RX ORDER — LOSARTAN POTASSIUM 100 MG/1
100 TABLET ORAL
Qty: 30 TABLET | Refills: 0 | Status: SHIPPED | OUTPATIENT
Start: 2018-03-06 | End: 2019-01-10 | Stop reason: SDUPTHER

## 2018-03-05 RX ORDER — FLUTICASONE PROPIONATE 50 MCG
2 SPRAY, SUSPENSION (ML) NASAL DAILY
Status: DISCONTINUED | OUTPATIENT
Start: 2018-03-05 | End: 2018-03-05 | Stop reason: HOSPADM

## 2018-03-05 RX ORDER — LOSARTAN POTASSIUM 50 MG/1
100 TABLET ORAL DAILY
Status: CANCELLED | OUTPATIENT
Start: 2018-03-05

## 2018-03-05 RX ORDER — PANTOPRAZOLE SODIUM 40 MG/1
40 TABLET, DELAYED RELEASE ORAL EVERY MORNING
Status: CANCELLED | OUTPATIENT
Start: 2018-03-05

## 2018-03-05 RX ORDER — LOSARTAN POTASSIUM 50 MG/1
100 TABLET ORAL
Status: DISCONTINUED | OUTPATIENT
Start: 2018-03-05 | End: 2018-03-05 | Stop reason: HOSPADM

## 2018-03-05 RX ORDER — CARVEDILOL 3.12 MG/1
3.12 TABLET ORAL EVERY 12 HOURS SCHEDULED
Status: DISCONTINUED | OUTPATIENT
Start: 2018-03-05 | End: 2018-03-05 | Stop reason: HOSPADM

## 2018-03-05 RX ORDER — OMEPRAZOLE 40 MG/1
40 CAPSULE, DELAYED RELEASE ORAL DAILY
Status: ON HOLD | COMMUNITY
End: 2018-03-05

## 2018-03-05 RX ORDER — LOSARTAN POTASSIUM 100 MG/1
100 TABLET ORAL DAILY
Status: ON HOLD | COMMUNITY
End: 2018-03-05

## 2018-03-05 RX ORDER — ATORVASTATIN CALCIUM 40 MG/1
40 TABLET, FILM COATED ORAL NIGHTLY
Status: DISCONTINUED | OUTPATIENT
Start: 2018-03-05 | End: 2018-03-05 | Stop reason: HOSPADM

## 2018-03-05 RX ADMIN — FLUTICASONE PROPIONATE 2 SPRAY: 50 SPRAY, METERED NASAL at 14:51

## 2018-03-05 RX ADMIN — REGADENOSON 0.4 MG: 0.08 INJECTION, SOLUTION INTRAVENOUS at 13:12

## 2018-03-05 RX ADMIN — CARVEDILOL 3.12 MG: 3.12 TABLET, FILM COATED ORAL at 14:52

## 2018-03-05 RX ADMIN — PENICILLIN V POTASSIUM 500 MG: 500 TABLET ORAL at 17:34

## 2018-03-05 RX ADMIN — TECHNETIUM TC 99M SESTAMIBI 1 DOSE: 1 INJECTION INTRAVENOUS at 13:12

## 2018-03-05 RX ADMIN — ASCORBIC ACID, THIAMINE MONONITRATE,RIBOFLAVIN, NIACINAMIDE, PYRIDOXINE HYDROCHLORIDE, FOLIC ACID, CYANOCOBALAMIN, BIOTIN, CALCIUM PANTOTHENATE, 1 MG: 100; 1.5; 1.7; 20; 10; 1; 6000; 150000; 5 CAPSULE, LIQUID FILLED ORAL at 14:51

## 2018-03-05 RX ADMIN — CETIRIZINE HYDROCHLORIDE 10 MG: 10 TABLET ORAL at 14:50

## 2018-03-05 RX ADMIN — TECHNETIUM TC 99M SESTAMIBI 1 DOSE: 1 INJECTION INTRAVENOUS at 11:50

## 2018-03-05 RX ADMIN — LOSARTAN POTASSIUM 100 MG: 50 TABLET, FILM COATED ORAL at 14:53

## 2018-03-05 NOTE — DISCHARGE SUMMARY
"Date of admission: 3/4/18  Date of discharge: 3/5/18    Principal diagnosis: Precordial chest pain  Secondary diagnosis:  Hypertension  Dyslipidemia  Gout    Consultants:  Dr. Parker cardiology which I discussed with prior to discharge    Procedures  Echocardiogram  Lexiscan stress test    Admission diagnosis: See history and physical    Exam see my note from earlier today    Hospital course: Patient was admitted with chest pain, patient did have some risk factors, myocardial infarction was ruled out with serial enzymes.  Once myocardial infarctions ruled out patient underwent an echocardiogram that showed normal ejection fraction and was otherwise normal.  She then underwent a stress test consistent with a low risk study.  Limitations of stress testing splinted the patient that she gets recurrent chest discomfort she could return at that time and would consider cardiac catheterization.  Her medications for her blood pressure have been adjusted by cardiology and she'll be discharged home on Cozaar and Coreg for her blood pressure and she will see her primary this week and Dr. Perkins 2 weeks.  Urinalysis did not show any evidence of proteinuria.  LDL was 124, transaminases normal.  Chest x-ray was negative.  EKG was normal to my review.  Condition on discharge is stable.  Patient had this question history of 2-3 weeks of a \"ear infection\".  I attempted to view her TM however I could not see this due to some cerumen in a narrow canal.  Because of the longevity of the ear pain and difficulty seeing the TM I suggested to the patient that her primary consider referral to ENT as she deems appropriate.    Follow-up:  Tatiana Shaw this week  Dr. Perkins 2 weeks    Diet:  Cardiac no added salt    Activity: As tolerated    Medications:  Coreg 3.125 twice a day  Cozaar 100 mg daily  Lipitor 40 mg a day  Vitamin home dose  Flonase 2 sprays each nostril daily  Claritin 10 mg a day  She will finish her prednisone " course    Discharge blood pressure 115/66, heart rate 82

## 2018-03-05 NOTE — PLAN OF CARE
Problem: Pain, Acute (Adult)  Goal: Acceptable Pain Control/Comfort Level  Outcome: Ongoing (interventions implemented as appropriate)      Problem: Patient Care Overview (Adult)  Goal: Plan of Care Review  Outcome: Ongoing (interventions implemented as appropriate)    Goal: Discharge Needs Assessment  Outcome: Ongoing (interventions implemented as appropriate)

## 2018-03-05 NOTE — H&P
"Hospitalist History and Physical    Patient Identification:  Name: Diana Alfaro  Age/Sex: 52 y.o. female  :  1965  MRN: [id number]         Primary Care Physician: TERI Govea    Chief Complaint   Patient presents with   • Chest Pain   • Hypertension       History of Present Illness  Patient is a 52 y.o. female presents with the following: uncontrolled hypertension with left arm pain and chest pain    The patient is a pleasant 52-year-old female with past medical history significant for essential hypertension and hyperlipidemia who presents to the emergency department complaining of elevated blood pressure with chest pain and left upper extremity pain.    H and states that she typically checks her blood pressure daily and states that her blood pressure was 170/97 today.  Patient stated that she went to [facility] and states that while there she experienced an mild, short-lived episode of chest pain that she describes as \"achy\" and without radiation.  The patient states that at that time her left arm was aching and felt \"heavy and numb.\"    The patient reports that her blood pressure remained elevated.  She states that either a family member or friend came over and confirmed this high blood pressure reading with her blood pressure machine.  The patient states that recently her systolic blood pressure has been in the 150s.  The patient states her repeat blood pressure on the day of admission was 146/86.  Patient states that her family encouraged her to report to the emergency department for further evaluation.  The patient states that while in the car, she again had an episode of left chest pain.  She said that it was in her left breast area and then again in the lower sternum.  She states it was achy.  She reports another episode of left arm pain at that time.  She denies any weakness, nausea, vomiting, diaphoresis and/or shortness of breath.    In the emergency department, the patient's initial blood " pressure was 167/90.  The patient currently denies any shortness of breath and/or pain in her left arm.  She denies any nausea.  She reports that she attempts to eat a low salt/low sugar/low cholesterol diet.  The patient states that she walks and has lost approximately 30 pounds.    The patient's primary cardiologist is Dr. Perkins.  The patient's last visit was in January 2018.  At that time he changed her losartan to losartan with hydrochlorothiazide, however, the patient states she was unable to take the diuretic due to her history of gout.  According to the patient's home medication reconciliation, she is currently on propranolol only for blood pressure control.    The patient has been admitted to the telemetry unit for further evaluation.    Present during visit: ZULEIKA Sharp    Stress Test, 5/16/17:  Interpretation Summary      · Findings consistent with a normal ECG stress test.  · Myocardial perfusion imaging indicates a normal myocardial perfusion study with no evidence of ischemia.  · Normal LV cavity size. Normal LV wall motion noted.  · Left ventricular ejection fraction is hyperdynamic (Calculated EF > 70%).  · Impressions are consistent with a low risk study.  · There is no prior study available for comparison.     Echocardiogram, 8/3/17:      Past History:  Past Medical History:   Diagnosis Date   • Chest pain    • Hyperlipidemia    • Hypertension      Past Surgical History:   Procedure Laterality Date   • CHOLECYSTECTOMY     • FOOT SURGERY      s/p clubfoot, R     Family History   Problem Relation Age of Onset   • Heart attack Mother    • Heart attack Father    • Heart attack Maternal Aunt    • Heart attack Maternal Uncle    • Heart attack Paternal Aunt    • Heart attack Paternal Uncle    • Heart attack Maternal Grandmother    • Heart attack Maternal Grandfather    • Heart attack Paternal Grandmother    • Heart attack Paternal Grandfather      Social History   Substance Use Topics   • Smoking status:  Never Smoker   • Smokeless tobacco: Never Used   • Alcohol use No     Prescriptions Prior to Admission   Medication Sig Dispense Refill Last Dose   • penicillin v potassium (VEETID) 500 MG tablet Take 500 mg by mouth 4 (Four) Times a Day.      • propranolol (INDERAL) 20 MG tablet Take 20 mg by mouth 3 (Three) Times a Day.      • atorvastatin (LIPITOR) 40 MG tablet Take 1 tablet by mouth Daily. (Patient taking differently: Take 20 mg by mouth Daily.) 30 tablet 11 Taking   • B Complex Vitamins (VITAMIN B-COMPLEX PO) Take  by mouth.   Taking   • ibuprofen (ADVIL,MOTRIN) 800 MG tablet Take 800 mg by mouth Every 6 (Six) Hours As Needed for Mild Pain (1-3).   Taking   • LORATADINE PO Take 10 mg by mouth Daily.   Taking   • losartan (COZAAR) 100 MG tablet Take 1 tablet by mouth Daily. 30 tablet 5    • vitamin D (ERGOCALCIFEROL) 60558 UNITS capsule capsule Take 50,000 Units by mouth 1 (One) Time Per Week.   Taking     Allergies: Review of patient's allergies indicates no known allergies.    Review of Systems:  Review of Systems   Constitutional: Negative for chills, diaphoresis and fever.   HENT: Negative for hearing loss, tinnitus and trouble swallowing.    Eyes: Negative for discharge and visual disturbance.   Respiratory: Negative for cough, shortness of breath and wheezing.    Cardiovascular: Positive for chest pain. Negative for palpitations and leg swelling.   Gastrointestinal: Negative for abdominal pain, constipation, diarrhea, nausea and vomiting.   Endocrine: Negative for polydipsia, polyphagia and polyuria.   Genitourinary: Negative for dysuria, frequency and hematuria.   Musculoskeletal: Negative for gait problem, myalgias and neck pain.   Skin: Negative for rash.   Neurological: Negative for dizziness, tremors, seizures, syncope, weakness and light-headedness.   Hematological: Does not bruise/bleed easily.   Psychiatric/Behavioral: Negative for confusion, hallucinations and suicidal ideas.      Vital  Signs  Temp:  [98 °F (36.7 °C)-99 °F (37.2 °C)] 99 °F (37.2 °C)  Heart Rate:  [56-76] 75  Resp:  [14-18] 14  BP: (141-167)/() 149/76  Body mass index is 25.78 kg/(m^2).    Physical Exam:  Physical Exam   Constitutional: She is oriented to person, place, and time. She appears well-developed and well-nourished. No distress.   HENT:   Head: Normocephalic and atraumatic.   Nose: Nose normal.   Mouth/Throat: Oropharynx is clear and moist and mucous membranes are normal.   Eyes: Conjunctivae and EOM are normal. Pupils are equal, round, and reactive to light. No scleral icterus.   Neck: Trachea normal. Neck supple. No JVD present. Carotid bruit is not present. No thyromegaly present.   Cardiovascular: Normal rate, regular rhythm and normal heart sounds.  Exam reveals no gallop and no friction rub.    No murmur heard.  Pulmonary/Chest: Effort normal. No respiratory distress. She has no wheezes. She has no rales.   Abdominal: Soft. Bowel sounds are normal. She exhibits no distension. There is no tenderness. There is no guarding.   Musculoskeletal: Normal range of motion.   Neurological: She is alert and oriented to person, place, and time. She has normal strength. No cranial nerve deficit.   Skin: Skin is warm, dry and intact. No rash noted. No erythema.   Psychiatric: She has a normal mood and affect. Her speech is normal.      Results Review:      Results from last 7 days  Lab Units 03/04/18  1617   WBC 10*3/mm3 5.73   HEMOGLOBIN g/dL 13.8   HEMATOCRIT % 41.0   PLATELETS 10*3/mm3 262       Results from last 7 days  Lab Units 03/04/18  1617   SODIUM mmol/L 138   POTASSIUM mmol/L 4.3   CHLORIDE mmol/L 105   CO2 mmol/L 28.7   BUN mg/dL 12   CREATININE mg/dL 0.75   CALCIUM mg/dL 9.3   GLUCOSE mg/dL 100       Results from last 7 days  Lab Units 03/04/18  1617   BILIRUBIN mg/dL 0.5   ALK PHOS U/L 70   AST (SGOT) U/L 40*   ALT (SGPT) U/L 28       Results from last 7 days  Lab Units 03/04/18  1902 03/04/18  1617   TROPONIN I  ng/mL <0.006 <0.006       Results from last 7 days  Lab Units 03/04/18  1617   INR  0.89*       Imaging:    I have personally reviewed the EKG. Per my view: NSR with no acute ST-T changes.     Imaging Results (most recent)     Procedure Component Value Units Date/Time    XR Chest 2 View [[id number]] Collected:  03/04/18 1959     Updated:  03/04/18 2001    Narrative:       EXAMINATION: XR CHEST 2 VW-      CLINICAL INDICATION: Chest pain protocol          COMPARISON: 05/25/2017      TECHNIQUE: XR CHEST 2 VW-      FINDINGS:   Lungs are adequately aerated.   Heart and mediastinal contours are unremarkable.   No pneumothorax.   Bony and soft tissue structures are unremarkable.            Impression:       No radiographic evidence of acute cardiac or pulmonary disease.     This report was finalized on 3/4/2018 7:59 PM by Dr. Pavel Fitch MD.             Assessment/Plan     -Chest pain and left upper extremity pain in the setting of uncontrolled hypertension  -Hyperlipidemia  -Mildly elevated AST    The patient has been admitted to the telemetry unit.  We will trend her cardiac isoenzymes.  We will monitor her blood pressure closely.  I will make the patient nothing by mouth at midnight while awaiting cardiology evaluation.    If her blood pressures remain uncontrolled and/or labile, may consider further workup to include a possible renal etiology although patient's BUN/Creatinine are acceptable at this time.     Update her lipid panel in the am and obtain a HgbA1c. I will repeat her CBC and BMP in the morning.    DVT prophylaxis: (Patient refusing subcutaneous heparin)    Estimated Length of Stay: < 2 MNs    I discussed the patients findings and my recommendations with patient and nursing staff     Status: Full code    Trice Larson DO  03/04/18  8:30 PM

## 2018-03-05 NOTE — PROGRESS NOTES
"  I have seen the patient in conjunction with Karin TO      History of presenting illness:  History was reviewed.  Patient has had some waxing and waning chest discomfort that had some radiation to the left arm.  She states she is pain-free now and actually on the Lexiscan study she did not get any chest pain she got some nausea.  She states this has resolved.  She states that she is been dealing with a left \"ear infection\" for 3 rounds of antibiotics.  She states it still hurts at times.  She states she has had some variant blood pressure that comes and goes.  She sees cardiology for this Dr. Perkins.  He had tried her on a diuretic but this make her gout worse.  Blood pressure medications have been adjusted by cardiology here.    Vital Signs  Temp:  [97.5 °F (36.4 °C)-99 °F (37.2 °C)] 98 °F (36.7 °C)  Heart Rate:  [62-76] 75  Resp:  [14-18] 18  BP: (121-165)/() 145/82  Body mass index is 25.78 kg/(m^2).      Intake/Output Summary (Last 24 hours) at 03/05/18 1721  Last data filed at 03/05/18 0302   Gross per 24 hour   Intake                0 ml   Output              200 ml   Net             -200 ml     Intake & Output (last 3 days)       03/02 0701 - 03/03 0700 03/03 0701 - 03/04 0700 03/04 0701 - 03/05 0700 03/05 0701 - 03/06 0700    Urine (mL/kg/hr)   200     Total Output     200      Net     -200                    Physical exam:  Physical Exam   Constitutional: Well-developed, well-nourished.  Pleasant.  No distress  Head: Normocephalic and atraumatic.  Hearing is intact, mucous membranes are moist.   Eyes:  Pupils are equal, round, and reactive to light. No scleral icterus.   Neck: Normal range of motion. Neck supple.  no JVD  Cardiovascular: Normal rate, regular rhythm and normal heart sounds.  No murmur rub or gallop  Pulmonary/Chest: Bilateral breath sounds no rhonchus rales or wheezing heard  Abdominal: Soft, flat, bowel sounds are active, nondistended nontender.  No peritoneal signs no.  Umbilical " bruits heard  Musculoskeletal: Strength is symmetric, no joint effusions noted.  No edema.  Excellent palpable peripheral pulses  Neurological: Nonfocal, alert.    Skin: Skin is warm and dry.   Psychiatric:  Normal mood and affect.     EKG: Image reviewed, essentially normal  Telemetry: Sinus rhythm, reviewed    Results Review:  Lab Results   Component Value Date    WBC 6.85 03/05/2018    HGB 13.3 03/05/2018    HCT 39.9 03/05/2018    MCV 87.5 03/05/2018     03/05/2018     Lab Results   Component Value Date    GLUCOSE 99 03/05/2018    BUN 16 03/05/2018    CREATININE 0.80 03/05/2018    EGFRIFNONA 75 03/05/2018    BCR 20.0 03/05/2018    CO2 29.1 03/05/2018    CALCIUM 9.0 03/05/2018    ALBUMIN 3.70 03/05/2018    LABIL2 1.5 03/05/2018    AST 27 03/05/2018    ALT 25 03/05/2018       Imaging Results (last 72 hours)     Procedure Component Value Units Date/Time    XR Chest 2 View [[id number]] Collected:  03/04/18 1959     Updated:  03/04/18 2001    Narrative:       EXAMINATION: XR CHEST 2 VW-      CLINICAL INDICATION: Chest pain protocol          COMPARISON: 05/25/2017      TECHNIQUE: XR CHEST 2 VW-      FINDINGS:   Lungs are adequately aerated.   Heart and mediastinal contours are unremarkable.   No pneumothorax.   Bony and soft tissue structures are unremarkable.            Impression:       No radiographic evidence of acute cardiac or pulmonary disease.     This report was finalized on 3/4/2018 7:59 PM by Dr. Pavel Fitch MD.                Assessment/Plan     Chest pain, troponins completely negative, echo and stress test pending.  Limitations of stress testing explained to the patient, I've explained that if stress test is negative and she continues to have substernal chest pain and she should return here which time could entertain cardiac catheterization.    Hypertension, medications adjusted by cardiology, I'm checking a urine to look for any evidence of proteinuria, will need to follow-up as an  outpatient.    Dyslipidemia, on Lipitor, outpatient follow-up.    Questional ear infection, attempted to evaluate her tympanic membranes but she had cerumen and fairly narrow canals bilaterally.  I suggested since this is the third round of antibiotics that her primary referral to ENT to clean out the cerumen and be able to further evaluate TMs more clearly.    Allergic rhinitis, she is on Claritin as well as immunotherapy.    Oneal Chapa MD  03/05/18  5:21 PM

## 2018-03-05 NOTE — TELEPHONE ENCOUNTER
Patient called left a voice mail on my phone for me to her back. Number she left was [phone] and it is busy and the other number on file is D/C. Will try again later.

## 2018-03-05 NOTE — CONSULTS
Referring Provider: Dr. Larson   Reason for Consultation: Chest pain and hypertension    Patient Care Team:  TERI Govea as PCP - General  TERI Govea as PCP - Family Medicine    Chief complaint: chest pain    Subjective .     History of present illness:    The patient is a 52-year-old white female if a history of gout, hypertension and dyslipidemia who comes to the hospital for elevated blood pressure.  According to the patient she has been having high blood pressure for the last few days.  She states her systolic blood pressure is running in the 170s.  She states that yesterday she went to [employer] and noted she was having midsternal oppressive chest pain radiating to her left arm associated with shortness of breath.  She denied any exacerbating or ameliorating factors.  She states the episode of chest pain lasted for a few minutes and resolve spontaneously.  She states the intensity was 5/10 on the pain scale.  She decided to come to the emergency room to be evaluated and was noted to be severely hypertensive in the 180s.  She was admitted to the medicine service for further evaluation.  Cardiology was consulted for management of hypertension and evaluation of the patient's chest pain.    Review of Systems    Review of Systems   Constitution: Positive for chills and malaise/fatigue. Negative for diaphoresis and fever.   HENT: Negative for congestion, ear pain and sore throat.    Eyes: Negative for blurred vision, pain and redness.   Cardiovascular: Negative for chest pain, leg swelling, orthopnea, palpitations, paroxysmal nocturnal dyspnea and syncope.   Respiratory: Positive for shortness of breath. Negative for cough, hemoptysis, sputum production and wheezing.    Endocrine: Negative for cold intolerance and heat intolerance.   Hematologic/Lymphatic: Does not bruise/bleed easily.   Skin: Negative for rash.   Musculoskeletal: Positive for arthritis, back pain, joint pain, joint swelling,  neck pain and stiffness. Negative for myalgias.   Gastrointestinal: Positive for hematochezia. Negative for abdominal pain, constipation, diarrhea, hematemesis, melena, nausea and vomiting.   Genitourinary: Negative for dysuria and hematuria.   Neurological: Negative for dizziness, focal weakness and numbness.   Psychiatric/Behavioral: Negative for depression. The patient is not nervous/anxious.        History    Past Medical History:   Diagnosis Date   • Chest pain    • Hyperlipidemia    • Hypertension         Past Surgical History:   Procedure Laterality Date   • CHOLECYSTECTOMY     • FOOT SURGERY      s/p clubfoot, R       Family History   Problem Relation Age of Onset   • Heart attack Mother    • Heart attack Father    • Heart attack Maternal Aunt    • Heart attack Maternal Uncle    • Heart attack Paternal Aunt    • Heart attack Paternal Uncle    • Heart attack Maternal Grandmother    • Heart attack Maternal Grandfather    • Heart attack Paternal Grandmother    • Heart attack Paternal Grandfather         Social History   Substance Use Topics   • Smoking status: Never Smoker   • Smokeless tobacco: Never Used   • Alcohol use No       Prescriptions Prior to Admission   Medication Sig Dispense Refill Last Dose   • atorvastatin (LIPITOR) 40 MG tablet Take 1 tablet by mouth Daily. (Patient taking differently: Take 20 mg by mouth Every Night.) 30 tablet 11 3/3/2018 at pm   • b complex-C-folic acid 1 MG capsule Take 1 capsule by mouth Daily.   3/4/2018 at am   • fluticasone (FLONASE) 50 MCG/ACT nasal spray 2 sprays into each nostril Daily.   3/4/2018 at Unknown time   • loratadine (CLARITIN) 10 MG tablet Take 10 mg by mouth Daily.   3/4/2018 at am   • penicillin v potassium (VEETID) 500 MG tablet Take 500 mg by mouth 2 (Two) Times a Day. PTA pt is on 7 day course of therapy starting 3/1/18   3/4/2018 at am   • propranolol (INDERAL) 20 MG tablet Take 20 mg by mouth Daily.   3/4/2018 at am         Scheduled Meds:   "      atorvastatin 20 mg Oral Nightly   cetirizine 10 mg Oral Daily   heparin (porcine) 5,000 Units Subcutaneous Q12H   lactobacillus acidophilus 1 capsule Oral Daily   penicillin v potassium 500 mg Oral BID AC   propranolol 20 mg Oral Daily   RENAL 1 capsule Oral Daily   , Continuous Infusions:   , PRN Meds:      nitroglycerin  •  sodium chloride  •  sodium chloride and Allergies:  Review of patient's allergies indicates no known allergies.    Objective     Vital Sign Min/Max for last 24 hours  Temp  Min: 97.5 °F (36.4 °C)  Max: 99 °F (37.2 °C)   BP  Min: 121/69  Max: 167/90   Pulse  Min: 56  Max: 76   Resp  Min: 14  Max: 18   SpO2  Min: 95 %  Max: 100 %   No Data Recorded   Weight  Min: 59.9 kg (132 lb)  Max: 60.8 kg (134 lb)     Flowsheet Rows         First Filed Value    Admission Height  152.4 cm (60\") Documented at 03/04/2018 1537    Admission Weight  60.8 kg (134 lb) Documented at 03/04/2018 1537             Ejection Fraction  No results found for: EF    Echo EF Estimated  Lab Results   Component Value Date    ECHOEFEST 65 03/05/2018       Nuclear Stress Ejection Fraction  No components found for: NUCEF    Cath Ejection Fraction Quantitative  No results found for: CATHEF    Lab Results (last 24 hours)     Procedure Component Value Units Date/Time    CBC & Differential [[id number]] Collected:  03/04/18 1617    Specimen:  Blood Updated:  03/04/18 1624    Narrative:       The following orders were created for panel order CBC & Differential.  Procedure                               Abnormality         Status                     ---------                               -----------         ------                     CBC Auto Differential[[id number]]        Abnormal            Final result                 Please view results for these tests on the individual orders.    CBC Auto Differential [[id number]]  (Abnormal) Collected:  03/04/18 1617    Specimen:  Blood Updated:  03/04/18 1624     WBC 5.73 10*3/mm3      RBC 4.75 " 10*6/mm3      Hemoglobin 13.8 g/dL      Hematocrit 41.0 %      MCV 86.3 fL      MCH 29.1 pg      MCHC 33.7 g/dL      RDW 12.3 %      RDW-SD 37.9 fl      MPV 10.7 (H) fL      Platelets 262 10*3/mm3      Neutrophil % 61.1 %      Lymphocyte % 28.8 %      Monocyte % 8.4 %      Eosinophil % 1.4 %      Basophil % 0.3 %      Immature Grans % 0.0 %      Neutrophils, Absolute 3.50 10*3/mm3      Lymphocytes, Absolute 1.65 10*3/mm3      Monocytes, Absolute 0.48 10*3/mm3      Eosinophils, Absolute 0.08 10*3/mm3      Basophils, Absolute 0.02 10*3/mm3      Immature Grans, Absolute 0.00 10*3/mm3     Protime-INR [[id number]]  (Abnormal) Collected:  03/04/18 1617    Specimen:  Blood Updated:  03/04/18 1637     Protime 12.1 Seconds       Note new Reference Range        INR 0.89 (L)    Narrative:       Suggested INR therapeutic range for stable oral anticoagulant therapy:    Low Intensity therapy:   1.5-2.0  Moderate Intensity therapy:   2.0-3.0  High Intensity therapy:   2.5-4.0    Comprehensive Metabolic Panel [[id number]]  (Abnormal) Collected:  03/04/18 1617    Specimen:  Blood Updated:  03/04/18 1644     Glucose 100 mg/dL      BUN 12 mg/dL      Creatinine 0.75 mg/dL      Sodium 138 mmol/L      Potassium 4.3 mmol/L       1+ Hemolysis         Chloride 105 mmol/L      CO2 28.7 mmol/L      Calcium 9.3 mg/dL      Total Protein 6.9 g/dL      Albumin 4.20 g/dL      ALT (SGPT) 28 U/L      AST (SGOT) 40 (H) U/L      Alkaline Phosphatase 70 U/L       Note New Reference Ranges        Total Bilirubin 0.5 mg/dL      eGFR Non African Amer 81 mL/min/1.73      Globulin 2.7 gm/dL      A/G Ratio 1.6 g/dL      BUN/Creatinine Ratio 16.0     Anion Gap 4.3 mmol/L     Osmolality, Calculated [[id number]]  (Normal) Collected:  03/04/18 1617    Specimen:  Blood Updated:  03/04/18 1645     Osmolality Calc 275.5 mOsm/kg     Troponin [[id number]]  (Normal) Collected:  03/04/18 1617    Specimen:  Blood Updated:  03/04/18 1648     Troponin I <0.006 ng/mL      Narrative:       Ultra Troponin I Reference Range:         <=0.039 ng/mL: Negative    0.04-0.779 ng/mL: Indeterminate Range. Suspicious of MI.  Clinical correlation required.       >=0.78  ng/mL: Consistent with myocardial injury.  Clinical correlation required.    Light Blue Top [[id number]] Collected:  03/04/18 1617    Specimen:  Blood Updated:  03/04/18 1731     Extra Tube hold for add-on      Auto resulted       Lavender Top [[id number]] Collected:  03/04/18 1617    Specimen:  Blood Updated:  03/04/18 1731     Extra Tube hold for add-on      Auto resulted       Gold Top - SST [[id number]] Collected:  03/04/18 1617    Specimen:  Blood Updated:  03/04/18 1731     Extra Tube Hold for add-ons.      Auto resulted.       [place] Draw [[id number]] Collected:  03/04/18 1617    Specimen:  Blood Updated:  03/04/18 1731    Narrative:       The following orders were created for panel order [place] Draw.  Procedure                               Abnormality         Status                     ---------                               -----------         ------                     Light Blue Top[[id number]]                                   Final result               Green Top (Gel)[[id number]]                                  Final result               Lavender Top[[id number]]                                     Final result               Gold Top - SST[[id number]]                                   Final result                 Please view results for these tests on the individual orders.    Green Top (Gel) [[id number]] Collected:  03/04/18 1617    Specimen:  Blood Updated:  03/04/18 1731     Extra Tube Hold for add-ons.      Auto resulted.       Troponin [[id number]]  (Normal) Collected:  03/04/18 1902    Specimen:  Blood from Arm, Right Updated:  03/04/18 1948     Troponin I <0.006 ng/mL     Narrative:       Ultra Troponin I Reference Range:         <=0.039 ng/mL: Negative    0.04-0.779 ng/mL: Indeterminate Range. Suspicious of MI.   Clinical correlation required.       >=0.78  ng/mL: Consistent with myocardial injury.  Clinical correlation required.    Hemoglobin A1c [[id number]]  (Normal) Collected:  03/04/18 2043    Specimen:  Blood Updated:  03/04/18 2112     Hemoglobin A1C 5.20 %     CK [[id number]]  (Normal) Collected:  03/04/18 2043    Specimen:  Blood Updated:  03/04/18 2124     Creatine Kinase 81 U/L     CK-MB [[id number]]  (Normal) Collected:  03/04/18 2043    Specimen:  Blood Updated:  03/04/18 2124     CKMB 1.53 ng/mL     Myoglobin, Serum [[id number]]  (Normal) Collected:  03/04/18 2043    Specimen:  Blood Updated:  03/04/18 2124     Myoglobin 45.0 ng/mL     Troponin [[id number]]  (Normal) Collected:  03/04/18 2043    Specimen:  Blood Updated:  03/04/18 2124     Troponin I <0.006 ng/mL     Narrative:       Ultra Troponin I Reference Range:         <=0.039 ng/mL: Negative    0.04-0.779 ng/mL: Indeterminate Range. Suspicious of MI.  Clinical correlation required.       >=0.78  ng/mL: Consistent with myocardial injury.  Clinical correlation required.    CK-MB Index [[id number]]  (Normal) Collected:  03/04/18 2043    Specimen:  Blood Updated:  03/04/18 2124     CK-MB Index 1.9 %     CBC & Differential [[id number]] Collected:  03/05/18 0132    Specimen:  Blood Updated:  03/05/18 0159    Narrative:       The following orders were created for panel order CBC & Differential.  Procedure                               Abnormality         Status                     ---------                               -----------         ------                     CBC Auto Differential[[id number]]        Abnormal            Final result                 Please view results for these tests on the individual orders.    CBC Auto Differential [[id number]]  (Abnormal) Collected:  03/05/18 0132    Specimen:  Blood Updated:  03/05/18 0159     WBC 6.85 10*3/mm3      RBC 4.56 10*6/mm3      Hemoglobin 13.3 g/dL      Hematocrit 39.9 %      MCV 87.5 fL      MCH 29.2 pg      MCHC  33.3 g/dL      RDW 12.5 %      RDW-SD 39.1 fl      MPV 10.9 (H) fL      Platelets 261 10*3/mm3      Neutrophil % 46.2 %      Lymphocyte % 40.6 %      Monocyte % 10.9 (H) %      Eosinophil % 2.0 %      Basophil % 0.3 %      Immature Grans % 0.0 %      Neutrophils, Absolute 3.16 10*3/mm3      Lymphocytes, Absolute 2.78 10*3/mm3      Monocytes, Absolute 0.75 10*3/mm3      Eosinophils, Absolute 0.14 10*3/mm3      Basophils, Absolute 0.02 10*3/mm3      Immature Grans, Absolute 0.00 10*3/mm3     Lipid Panel [[id number]]  (Abnormal) Collected:  03/05/18 0132    Specimen:  Blood Updated:  03/05/18 0224     Total Cholesterol 186 mg/dL      Triglycerides 93 mg/dL      HDL Cholesterol 43 (L) mg/dL      LDL Cholesterol  124 (H) mg/dL      VLDL Cholesterol 18.6 mg/dL      LDL/HDL Ratio 2.89    Narrative:       Cholesterol Reference Ranges  (U.S[place] Department of Health and Human Services ATP III Classifications)    Desirable          <200 mg/dL  Borderline High    200-239 mg/dL  High Risk          >240 mg/dL      Triglyceride Reference Ranges  (U.S. Department of Health and Human Services ATP III Classifications)    Normal           <150 mg/dL  Borderline High  150-199 mg/dL  High             200-499 mg/dL  Very High        >500 mg/dL    HDL Reference Ranges  (U.S. Department of Health and Human Services ATP III Classifcations)    Low     <40 mg/dl (major risk factor for CHD)  High    >60 mg/dl ('negative' risk factor for CHD)        LDL Reference Ranges  (U.S. Department of Health and Human Services ATP III Classifcations)    Optimal          <100 mg/dL  Near Optimal     100-129 mg/dL  Borderline High  130-159 mg/dL  High             160-189 mg/dL  Very High        >189 mg/dL    Comprehensive Metabolic Panel [[id number]] Collected:  03/05/18 0132    Specimen:  Blood Updated:  03/05/18 0224     Glucose 99 mg/dL      BUN 16 mg/dL      Creatinine 0.80 mg/dL      Sodium 142 mmol/L      Potassium 4.0 mmol/L      Chloride 108 mmol/L       CO2 29.1 mmol/L      Calcium 9.0 mg/dL      Total Protein 6.2 g/dL      Albumin 3.70 g/dL      ALT (SGPT) 25 U/L      AST (SGOT) 27 U/L      Alkaline Phosphatase 64 U/L       Note New Reference Ranges        Total Bilirubin 0.3 mg/dL      eGFR Non African Amer 75 mL/min/1.73      Globulin 2.5 gm/dL      A/G Ratio 1.5 g/dL      BUN/Creatinine Ratio 20.0     Anion Gap 4.9 mmol/L     Osmolality, Calculated [[id number]]  (Normal) Collected:  03/05/18 0132    Specimen:  Blood Updated:  03/05/18 0224     Osmolality Calc 284.3 mOsm/kg     CK [[id number]]  (Normal) Collected:  03/05/18 0132    Specimen:  Blood Updated:  03/05/18 0232     Creatine Kinase 64 U/L     CK-MB [[id number]]  (Normal) Collected:  03/05/18 0132    Specimen:  Blood Updated:  03/05/18 0232     CKMB 1.08 ng/mL     Myoglobin, Serum [[id number]]  (Normal) Collected:  03/05/18 0132    Specimen:  Blood Updated:  03/05/18 0232     Myoglobin 28.0 ng/mL     Troponin [[id number]]  (Normal) Collected:  03/05/18 0132    Specimen:  Blood Updated:  03/05/18 0232     Troponin I <0.006 ng/mL     Narrative:       Ultra Troponin I Reference Range:         <=0.039 ng/mL: Negative    0.04-0.779 ng/mL: Indeterminate Range. Suspicious of MI.  Clinical correlation required.       >=0.78  ng/mL: Consistent with myocardial injury.  Clinical correlation required.    CK-MB Index [[id number]]  (Normal) Collected:  03/05/18 0132    Specimen:  Blood Updated:  03/05/18 0232     CK-MB Index 1.7 %     Urine Drug Screen - [[id number]]  (Normal) Collected:  03/05/18 0305    Specimen:  Urine Updated:  03/05/18 0329     Amphetamine Screen, Urine Negative     Barbiturates Screen, Urine Negative     Benzodiazepine Screen, Urine Negative     Cocaine Screen, Urine Negative     Methadone Screen, Urine Negative     Opiate Screen Negative     Phencyclidine (PCP), Urine Negative     THC, Screen, Urine Negative     6-ACETYL MORPHINE Negative     Buprenorphine, Screen, Urine Negative     Oxycodone  Screen, Urine Negative    Narrative:       Negative Thresholds For Drugs Screened:                  Amphetamines              1000 ng/ml               Barbiturates               200 ng/ml               Benzodiazepines            200 ng/ml              Cocaine                    300 ng/ml              Methadone                  300 ng/ml              Opiates                    300 ng/ml               Phencyclidine               25 ng/ml               THC                         50 ng/ml              6-Acetyl Morphine           10 ng/ml              Buprenorphine                5 ng/ml              Oxycodone                  300 ng/ml    The reference range for all drugs tested is negative. This report includes final unconfirmed qualitative results to be used for medical treatment purposes only. Unconfirmed results must not be used for non-medical purposes such as employment or legal testing. Clinical consideration should be applied to any drug of abuse test, especially when unconfirmed quantitative results are used.      Myoglobin, Serum [[id number]]  (Normal) Collected:  03/05/18 0818    Specimen:  Blood Updated:  03/05/18 0853     Myoglobin 30.0 ng/mL     Troponin [[id number]]  (Normal) Collected:  03/05/18 0818    Specimen:  Blood Updated:  03/05/18 0853     Troponin I <0.006 ng/mL     Narrative:       Ultra Troponin I Reference Range:         <=0.039 ng/mL: Negative    0.04-0.779 ng/mL: Indeterminate Range. Suspicious of MI.  Clinical correlation required.       >=0.78  ng/mL: Consistent with myocardial injury.  Clinical correlation required.    CK-MB [[id number]]  (Normal) Collected:  03/05/18 0818    Specimen:  Blood Updated:  03/05/18 0856     CKMB 0.63 ng/mL     CK [[id number]]  (Normal) Collected:  03/05/18 0818    Specimen:  Blood Updated:  03/05/18 0906     Creatine Kinase 61 U/L       2+ Hemolysis         CK-MB Index [[id number]]  (Normal) Collected:  03/05/18 0818    Specimen:  Blood Updated:  03/05/18 0908      CK-MB Index 1.0 %           Physical Exam   Constitutional: She is oriented to person, place, and time. She appears well-developed and well-nourished.   White female lying comfortably on bed.   HENT:   Mouth/Throat: Oropharynx is clear and moist.   Eyes: EOM are normal. Pupils are equal, round, and reactive to light.   Neck: Neck supple. No JVD present. No tracheal deviation present. No thyromegaly present.   Cardiovascular: Normal rate, regular rhythm, S1 normal and S2 normal.  Exam reveals no gallop and no friction rub.    No murmur heard.  Pulmonary/Chest: Effort normal and breath sounds normal. No respiratory distress. She has no wheezes. She has no rales.   Abdominal: Soft. Bowel sounds are normal. She exhibits no mass. There is no tenderness.   Musculoskeletal: Normal range of motion. She exhibits no edema.   Lymphadenopathy:     She has no cervical adenopathy.   Neurological: She is alert and oriented to person, place, and time.   Skin: Skin is warm and dry. No rash noted.   Psychiatric: She has a normal mood and affect.       Results Review:   I reviewed the patient's new clinical results.  I reviewed the patient's new imaging results and agree with the interpretation.  I reviewed the patient's other test results and agree with the interpretation  I personally viewed and interpreted the patient's EKG/Telemetry data      Assessment/Plan     Active Problems:    Chest pain    1.  Chest pain: Patient with chest pain with some typical and some atypical features for coronary artery disease.  She has multiple risk factors for coronary artery disease including age, hypertension and dyslipidemia.  Serial troponins have been negative up to this point.  EKG shows sinus rhythm with no ST changes concerning for ischemia.      2.  Hypertension: Patient with a history of hypertension who remains hypertensive on current regimen.    3.  Dyslipidemia: Patient with a history of dyslipidemia which is currently uncontrolled  on current regimen.    Plan:    1.  Chest pain: At this point we'll evaluate further with stress test and echocardiogram.    2.  Hypertension: At this point we will restart on losartan and start carvedilol 3.125 mg by mouth twice a day.  Will monitor for improvement.    3.  Dyslipidemia: We will increase atorvastatin 40 mg by mouth twice a day.    I discussed the patients findings and my recommendations with patient and nursing staff    Gopi Shipman MD  03/05/18  11:21 AM

## 2018-03-05 NOTE — PLAN OF CARE
Problem: Pain, Acute (Adult)  Goal: Identify Related Risk Factors and Signs and Symptoms  Outcome: Ongoing (interventions implemented as appropriate)    Goal: Acceptable Pain Control/Comfort Level  Outcome: Ongoing (interventions implemented as appropriate)      Problem: Cardiac Output, Decreased (Adult)  Goal: Identify Related Risk Factors and Signs and Symptoms  Outcome: Ongoing (interventions implemented as appropriate)    Goal: Adequate Cardiac Output/Effective Tissue Perfusion  Outcome: Ongoing (interventions implemented as appropriate)      Problem: Patient Care Overview (Adult)  Goal: Plan of Care Review  Outcome: Ongoing (interventions implemented as appropriate)    Goal: Adult Individualization and Mutuality  Outcome: Ongoing (interventions implemented as appropriate)    Goal: Discharge Needs Assessment  Outcome: Ongoing (interventions implemented as appropriate)

## 2018-03-13 ENCOUNTER — HOSPITAL ENCOUNTER (EMERGENCY)
Facility: HOSPITAL | Age: 53
Discharge: HOME | End: 2018-03-13
Attending: EMERGENCY MEDICINE | Admitting: EMERGENCY MEDICINE

## 2018-03-13 VITALS
OXYGEN SATURATION: 99 % | BODY MASS INDEX: 26.31 KG/M2 | HEIGHT: 60 IN | WEIGHT: 134 LBS | SYSTOLIC BLOOD PRESSURE: 158 MMHG | TEMPERATURE: 97.5 F | RESPIRATION RATE: 14 BRPM | HEART RATE: 69 BPM | DIASTOLIC BLOOD PRESSURE: 88 MMHG

## 2018-03-13 DIAGNOSIS — S16.1XXA STRAIN OF NECK MUSCLE, INITIAL ENCOUNTER: Primary | ICD-10-CM

## 2018-03-13 PROCEDURE — 99283 EMERGENCY DEPT VISIT LOW MDM: CPT

## 2018-03-13 RX ORDER — CYCLOBENZAPRINE HCL 5 MG
5 TABLET ORAL 2 TIMES DAILY PRN
Qty: 15 TABLET | Refills: 0 | Status: ON HOLD | OUTPATIENT
Start: 2018-03-13 | End: 2018-07-18

## 2018-03-13 RX ORDER — CYCLOBENZAPRINE HCL 10 MG
5 TABLET ORAL ONCE
Status: COMPLETED | OUTPATIENT
Start: 2018-03-13 | End: 2018-03-13

## 2018-03-13 RX ADMIN — CYCLOBENZAPRINE HYDROCHLORIDE 5 MG: 10 TABLET, FILM COATED ORAL at 15:42

## 2018-03-13 NOTE — ED PROVIDER NOTES
Subjective   -year-old white female presents to ER complaining of neck and shoulder pain.  Patient admitted that she was recently admitted to the hospital secondary to uncontrolled hypertension.  Patient admitted that she underwent echo and stress test during her hospital stay.  Stress test was ruled to be normal.        History provided by:  Patient  Neck Pain   Pain location:  L side  Quality:  Stiffness and aching  Stiffness is present:  All day  Pain radiates to:  L shoulder  Pain severity:  Mild  Pain is:  Same all the time  Onset quality:  Sudden  Duration:  1 day  Timing:  Constant  Progression:  Waxing and waning  Chronicity:  New  Context: not fall, not lifting a heavy object, not pedestrian accident and not recent injury    Relieved by:  Nothing  Associated symptoms: no chest pain, no fever, no numbness, no tingling, no visual change and no weakness        Review of Systems   Constitutional: Negative.  Negative for fever.   HENT: Negative.    Respiratory: Negative.    Cardiovascular: Negative.  Negative for chest pain.   Gastrointestinal: Negative.  Negative for abdominal pain.   Endocrine: Negative.    Genitourinary: Negative.  Negative for dysuria.   Musculoskeletal: Positive for arthralgias and neck pain.   Skin: Negative.    Neurological: Negative.  Negative for tingling, weakness and numbness.   Psychiatric/Behavioral: Negative.    All other systems reviewed and are negative.      Past Medical History:   Diagnosis Date   • Chest pain    • Hyperlipidemia    • Hypertension        No Known Allergies    Past Surgical History:   Procedure Laterality Date   • CHOLECYSTECTOMY     • FOOT SURGERY      s/p clubfoot, R       Family History   Problem Relation Age of Onset   • Heart attack Mother    • Heart attack Father    • Heart attack Maternal Aunt    • Heart attack Maternal Uncle    • Heart attack Paternal Aunt    • Heart attack Paternal Uncle    • Heart attack Maternal Grandmother    • Heart attack Maternal  Grandfather    • Heart attack Paternal Grandmother    • Heart attack Paternal Grandfather        Social History     Social History   • Marital status: Single     Social History Main Topics   • Smoking status: Never Smoker   • Smokeless tobacco: Never Used   • Alcohol use No   • Drug use: No     Other Topics Concern   • Not on file           Objective   Physical Exam   Constitutional: She is oriented to person, place, and time. She appears well-developed and well-nourished. No distress.   HENT:   Head: Normocephalic and atraumatic.   Nose: Nose normal.   Eyes: Conjunctivae are normal.   Neck: Normal range of motion. Neck supple. No JVD present. No tracheal deviation present.   Cardiovascular: Normal rate.    No murmur heard.  Pulmonary/Chest: Effort normal. No respiratory distress. She has no wheezes.   Abdominal: Soft. There is no tenderness.   Musculoskeletal: She exhibits tenderness. She exhibits no edema or deformity.   Tenderness to the left trapezius/left scapula with palpation.   Neurological: She is alert and oriented to person, place, and time. No cranial nerve deficit.   Skin: Skin is warm and dry. No rash noted. She is not diaphoretic. No erythema. No pallor.   Psychiatric: She has a normal mood and affect. Her behavior is normal. Thought content normal.   Nursing note and vitals reviewed.      Procedures         ED Course  ED Course                  MDM  Number of Diagnoses or Management Options  Strain of neck muscle, initial encounter: new and does not require workup  Risk of Complications, Morbidity, and/or Mortality  Presenting problems: low  Diagnostic procedures: low  Management options: low    Patient Progress  Patient progress: stable      Final diagnoses:   Strain of neck muscle, initial encounter            BHAVIK Flores  03/13/18 [id number]

## 2018-03-26 ENCOUNTER — OFFICE VISIT (OUTPATIENT)
Dept: CARDIOLOGY | Facility: CLINIC | Age: 53
End: 2018-03-26

## 2018-03-26 VITALS
HEART RATE: 66 BPM | HEIGHT: 60 IN | WEIGHT: 135 LBS | DIASTOLIC BLOOD PRESSURE: 83 MMHG | BODY MASS INDEX: 26.5 KG/M2 | RESPIRATION RATE: 16 BRPM | SYSTOLIC BLOOD PRESSURE: 144 MMHG

## 2018-03-26 DIAGNOSIS — R07.2 PRECORDIAL PAIN: Primary | ICD-10-CM

## 2018-03-26 DIAGNOSIS — E78.2 MIXED HYPERLIPIDEMIA: ICD-10-CM

## 2018-03-26 DIAGNOSIS — I10 ESSENTIAL HYPERTENSION: ICD-10-CM

## 2018-03-26 PROCEDURE — 99213 OFFICE O/P EST LOW 20 MIN: CPT | Performed by: INTERNAL MEDICINE

## 2018-03-26 RX ORDER — ATORVASTATIN CALCIUM 40 MG/1
40 TABLET, FILM COATED ORAL DAILY
Qty: 30 TABLET | Refills: 11 | Status: SHIPPED | OUTPATIENT
Start: 2018-03-26 | End: 2018-03-26 | Stop reason: SDUPTHER

## 2018-03-26 RX ORDER — CARVEDILOL 3.12 MG/1
3.12 TABLET ORAL EVERY 12 HOURS SCHEDULED
Qty: 60 TABLET | Refills: 0 | Status: SHIPPED | OUTPATIENT
Start: 2018-03-26 | End: 2018-05-24 | Stop reason: SDUPTHER

## 2018-03-26 RX ORDER — IBUPROFEN 800 MG/1
800 TABLET ORAL EVERY 6 HOURS PRN
COMMUNITY

## 2018-03-26 RX ORDER — ASPIRIN 81 MG/1
81 TABLET ORAL DAILY PRN
COMMUNITY
End: 2019-08-08

## 2018-03-26 RX ORDER — ATORVASTATIN CALCIUM 40 MG/1
40 TABLET, FILM COATED ORAL DAILY
Qty: 30 TABLET | Refills: 11 | Status: SHIPPED | OUTPATIENT
Start: 2018-03-26 | End: 2019-07-23 | Stop reason: SDUPTHER

## 2018-03-26 NOTE — PROGRESS NOTES
TERI Govea  Diana Alfaro  1965  03/26/2018    Patient Active Problem List   Diagnosis   • Dyslipidemia   • Essential hypertension   • Hyperlipidemia   • Precordial pain   • Drug therapy changed   • Chest pain       Dear TERI Govea:    Subjective     Diana Alfaro is a 52 y.o. female with the problems as listed above, presents    Chief complaint: Follow-up of chest pains.    History of Present Illness:Ms. Alfaro is a pleasant 52-year-old  female who was recently admitted to the hospital with chest pains and underwent stress nuclear test which was reportedly normal per Dr. Parker and an echo Doppler study that was normal as well.  She is here for a regular cardiology follow-up.  She denies any further complaints of chest pains or shortness of breath.      No Known Allergies:      Current Outpatient Prescriptions:   •  aspirin 81 MG EC tablet, Take 81 mg by mouth Daily., Disp: , Rfl:   •  atorvastatin (LIPITOR) 40 MG tablet, Take 1 tablet by mouth Daily., Disp: 30 tablet, Rfl: 11  •  b complex-C-folic acid 1 MG capsule, Take 1 capsule by mouth Daily., Disp: , Rfl:   •  cyclobenzaprine (FLEXERIL) 5 MG tablet, Take 1 tablet by mouth 2 (Two) Times a Day As Needed for Muscle Spasms., Disp: 15 tablet, Rfl: 0  •  fluticasone (FLONASE) 50 MCG/ACT nasal spray, 2 sprays into each nostril Daily., Disp: , Rfl:   •  ibuprofen (ADVIL,MOTRIN) 800 MG tablet, Take 800 mg by mouth Every 6 (Six) Hours As Needed for Mild Pain ., Disp: , Rfl:   •  losartan (COZAAR) 100 MG tablet, Take 1 tablet by mouth Daily., Disp: 30 tablet, Rfl: 0  •  Multiple Minerals (CALCIUM/MAGNESIUM/ZINC PO), Take  by mouth., Disp: , Rfl:   •  penicillin v potassium (VEETID) 500 MG tablet, Take 500 mg by mouth 2 (Two) Times a Day. PTA pt is on 7 day course of therapy starting 3/1/18, Disp: , Rfl:   •  Plant Sterol Stanol-Pantethine (CHOLESTOFF COMPLETE PO), Take  by mouth., Disp: , Rfl:   •  loratadine (CLARITIN) 10 MG tablet,  "Take 10 mg by mouth Daily., Disp: , Rfl:       The following portions of the patient's history were reviewed and updated as appropriate: allergies, current medications, past family history, past medical history, past social history, past surgical history and problem list.    Social History   Substance Use Topics   • Smoking status: Never Smoker   • Smokeless tobacco: Never Used   • Alcohol use No       Review of Systems   Constitution: Negative for chills and fever.   HENT: Negative for nosebleeds and sore throat.    Cardiovascular: Positive for chest pain. Negative for leg swelling and palpitations.   Respiratory: Negative for cough, hemoptysis, shortness of breath and wheezing.    Gastrointestinal: Negative for abdominal pain, hematemesis, hematochezia, melena, nausea and vomiting.   Genitourinary: Negative for dysuria and hematuria.   Neurological: Negative for headaches.       Objective   Vitals:    03/26/18 1016   BP: 144/83   Pulse: 66   Resp: 16   Weight: 61.2 kg (135 lb)   Height: 152.4 cm (60\")     Body mass index is 26.37 kg/m².        Physical Exam   Constitutional: She is oriented to person, place, and time. She appears well-developed and well-nourished.   HENT:   Mouth/Throat: Oropharynx is clear and moist.   Eyes: EOM are normal. Pupils are equal, round, and reactive to light.   Neck: Neck supple. No JVD present. No tracheal deviation present. No thyromegaly present.   Cardiovascular: Normal rate, regular rhythm, S1 normal and S2 normal.  Exam reveals no gallop and no friction rub.    No murmur heard.  Pulmonary/Chest: Effort normal and breath sounds normal.   Abdominal: Soft. Bowel sounds are normal. She exhibits no mass. There is no tenderness.   Musculoskeletal: Normal range of motion. She exhibits no edema.   Lymphadenopathy:     She has no cervical adenopathy.   Neurological: She is alert and oriented to person, place, and time.   Skin: Skin is warm and dry. No rash noted.   Psychiatric: She has a " normal mood and affect.       Lab Results   Component Value Date     2018    K 4.0 2018     2018    CO2 29.1 2018    BUN 16 2018    CREATININE 0.80 2018    GLUCOSE 99 2018    CALCIUM 9.0 2018    AST 27 2018    ALT 25 2018    ALKPHOS 64 2018    LABIL2 1.5 2018     Lab Results   Component Value Date    CKTOTAL 61 2018     Lab Results   Component Value Date    WBC 6.85 2018    HGB 13.3 2018    HCT 39.9 2018     2018     Lab Results   Component Value Date    INR 0.89 (L) 2018    INR 0.89 2017     No results found for: MG  Lab Results   Component Value Date    TRIG 93 2018    HDL 43 (L) 2018     (H) 2018      Lab Results   Component Value Date    BNP 26.0 2017     Diana Alfaro   Regadenoson Stress Test With Myocardial Perfusion SPECT (Multi Study)   Order# [id number]   Reading physician:   Gopi Shipman MD Ordering physician:   Gopi Shipman MD Study date: 3/5/18   Patient Information     Patient Name  Diana Alfaro MRN  [id number] Sex  Female  (Age)  1965 (52 y.o.)   Interpretation Summary     · Impressions are consistent with a low risk study.  · Myocardial perfusion imaging indicates a normal myocardial perfusion study with no evidence of ischemia.  · Left ventricular ejection fraction is hyperdynamic (Calculated EF > 70%).  · TID is 0.96.     Diana Alfaro   Echocardiogram   Order# [id number]   Reading physician:   Gopi Shipman MD Ordering physician:   Trice Larson DO Study date: 3/5/18   Patient Information     Patient Name  Diana Alfaro MRN  [id number] Sex  Female  (Age)  1965 (52 y.o.)   Interpretation Summary     · The study is technically adequate for diagnosis.  · Left ventricular systolic function is normal. Estimated EF = 65%.  · There are no significant valvular abnormalities  noted.  · There is no evidence of pericardial effusion.       Procedures    Assessment/Plan    Diagnosis Plan   1. Precordial pain     2. Recent normal myocardial perfusion study.      3. Essential hypertension     4. Mixed hyperlipidemia        Recommendations:  1. Continue with atorvastatin and carvedilol for now.  2. I have reviewed her stress test and echo results with the patient.  3. Follow up in 7-8 months.    Return in about 7 months (around 10/26/2018).    As always, I appreciate very much the opportunity to participate in the cardiovascular care of your patients.      With Best Regards,    Cornell Perkins MD, [facility]    Dragon disclaimer:  Much of this encounter note is an electronic transcription/translation of spoken language to printed text. The electronic translation of spoken language may permit erroneous, or at times, nonsensical words or phrases to be inadvertently transcribed; Although I have reviewed the note for such errors, some may still exist.

## 2018-04-16 ENCOUNTER — TRANSCRIBE ORDERS (OUTPATIENT)
Dept: ADMINISTRATIVE | Facility: HOSPITAL | Age: 53
End: 2018-04-16

## 2018-04-16 DIAGNOSIS — J32.0 CHRONIC MAXILLARY SINUSITIS: Primary | ICD-10-CM

## 2018-04-19 ENCOUNTER — TELEPHONE (OUTPATIENT)
Dept: CARDIOLOGY | Facility: CLINIC | Age: 53
End: 2018-04-19

## 2018-04-19 NOTE — TELEPHONE ENCOUNTER
Pt called and stated that her BP was 140 90 yesterday and is 180/92 today. She wanted to know if she needed to start back on her coreg. I advised that when she was in the office on 3.26.18 that  wanted her to continue it. She stated she was going to start back on it and call if her BP doesn't come down.

## 2018-04-20 NOTE — TELEPHONE ENCOUNTER
Office note says to continue Coreg.  Continue to monitor blood pressure of the weekend with restarting it. Call if running >150/90.

## 2018-04-23 NOTE — TELEPHONE ENCOUNTER
Called patient and given message per Cathleen Christianson PA-C. Patient did start back on the coreg and will monitor her B/P.  Also asked if Aspirin was on her med list.  I advised her it was.   She agreed and voiced understanding.

## 2018-05-16 ENCOUNTER — HOSPITAL ENCOUNTER (OUTPATIENT)
Dept: CT IMAGING | Facility: HOSPITAL | Age: 53
Discharge: HOME | End: 2018-05-16
Attending: OTOLARYNGOLOGY | Admitting: OTOLARYNGOLOGY

## 2018-05-16 ENCOUNTER — APPOINTMENT (OUTPATIENT)
Dept: CT IMAGING | Facility: HOSPITAL | Age: 53
End: 2018-05-16
Attending: OTOLARYNGOLOGY

## 2018-05-16 DIAGNOSIS — J32.0 CHRONIC MAXILLARY SINUSITIS: ICD-10-CM

## 2018-05-16 PROCEDURE — 70486 CT MAXILLOFACIAL W/O DYE: CPT

## 2018-05-16 PROCEDURE — 70486 CT MAXILLOFACIAL W/O DYE: CPT | Performed by: RADIOLOGY

## 2018-05-24 RX ORDER — CARVEDILOL 3.12 MG/1
TABLET ORAL
Qty: 60 TABLET | Refills: 3 | Status: SHIPPED | OUTPATIENT
Start: 2018-05-24 | End: 2019-01-10 | Stop reason: SINTOL

## 2018-06-26 PROBLEM — J34.2 NASAL SEPTAL DEVIATION: Status: ACTIVE | Noted: 2018-06-26

## 2018-07-16 ENCOUNTER — ANESTHESIA EVENT (OUTPATIENT)
Dept: PERIOP | Facility: HOSPITAL | Age: 53
End: 2018-07-16

## 2018-07-17 ENCOUNTER — APPOINTMENT (OUTPATIENT)
Dept: PREADMISSION TESTING | Facility: HOSPITAL | Age: 53
End: 2018-07-17

## 2018-07-17 LAB
ANION GAP SERPL CALCULATED.3IONS-SCNC: 2.5 MMOL/L (ref 3.6–11.2)
BUN BLD-MCNC: 20 MG/DL (ref 7–21)
BUN/CREAT SERPL: 26 (ref 7–25)
CALCIUM SPEC-SCNC: 9.3 MG/DL (ref 7.7–10)
CHLORIDE SERPL-SCNC: 108 MMOL/L (ref 99–112)
CO2 SERPL-SCNC: 29.5 MMOL/L (ref 24.3–31.9)
CREAT BLD-MCNC: 0.77 MG/DL (ref 0.43–1.29)
DEPRECATED RDW RBC AUTO: 39.2 FL (ref 37–54)
ERYTHROCYTE [DISTWIDTH] IN BLOOD BY AUTOMATED COUNT: 12.3 % (ref 11.5–14.5)
GFR SERPL CREATININE-BSD FRML MDRD: 78 ML/MIN/1.73
GLUCOSE BLD-MCNC: 87 MG/DL (ref 70–110)
HCT VFR BLD AUTO: 40.7 % (ref 37–47)
HGB BLD-MCNC: 13.4 G/DL (ref 12–16)
MCH RBC QN AUTO: 28.9 PG (ref 27–33)
MCHC RBC AUTO-ENTMCNC: 32.9 G/DL (ref 33–37)
MCV RBC AUTO: 87.9 FL (ref 80–94)
OSMOLALITY SERPL CALC.SUM OF ELEC: 281.4 MOSM/KG (ref 273–305)
PLATELET # BLD AUTO: 238 10*3/MM3 (ref 130–400)
PMV BLD AUTO: 11.2 FL (ref 6–10)
POTASSIUM BLD-SCNC: 3.9 MMOL/L (ref 3.5–5.3)
RBC # BLD AUTO: 4.63 10*6/MM3 (ref 4.2–5.4)
SODIUM BLD-SCNC: 140 MMOL/L (ref 135–153)
WBC NRBC COR # BLD: 6.33 10*3/MM3 (ref 4.5–12.5)

## 2018-07-17 PROCEDURE — 36415 COLL VENOUS BLD VENIPUNCTURE: CPT

## 2018-07-17 PROCEDURE — 85027 COMPLETE CBC AUTOMATED: CPT | Performed by: ANESTHESIOLOGY

## 2018-07-17 PROCEDURE — 80048 BASIC METABOLIC PNL TOTAL CA: CPT | Performed by: ANESTHESIOLOGY

## 2018-07-18 ENCOUNTER — ANESTHESIA (OUTPATIENT)
Dept: PERIOP | Facility: HOSPITAL | Age: 53
End: 2018-07-18

## 2018-07-18 ENCOUNTER — HOSPITAL ENCOUNTER (OUTPATIENT)
Facility: HOSPITAL | Age: 53
Setting detail: HOSPITAL OUTPATIENT SURGERY
Discharge: HOME | End: 2018-07-18
Attending: OTOLARYNGOLOGY | Admitting: ANESTHESIOLOGY

## 2018-07-18 VITALS
WEIGHT: 134 LBS | RESPIRATION RATE: 20 BRPM | SYSTOLIC BLOOD PRESSURE: 123 MMHG | TEMPERATURE: 97.6 F | BODY MASS INDEX: 26.31 KG/M2 | HEIGHT: 60 IN | DIASTOLIC BLOOD PRESSURE: 75 MMHG | HEART RATE: 73 BPM | OXYGEN SATURATION: 100 %

## 2018-07-18 PROCEDURE — 25010000002 ONDANSETRON PER 1 MG: Performed by: NURSE ANESTHETIST, CERTIFIED REGISTERED

## 2018-07-18 PROCEDURE — 25010000002 DEXAMETHASONE PER 1 MG: Performed by: NURSE ANESTHETIST, CERTIFIED REGISTERED

## 2018-07-18 PROCEDURE — 25010000002 MIDAZOLAM PER 1 MG: Performed by: NURSE ANESTHETIST, CERTIFIED REGISTERED

## 2018-07-18 PROCEDURE — 25010000002 NEOSTIGMINE 10 MG/10ML SOLUTION: Performed by: NURSE ANESTHETIST, CERTIFIED REGISTERED

## 2018-07-18 PROCEDURE — 25010000002 FENTANYL CITRATE (PF) 100 MCG/2ML SOLUTION: Performed by: NURSE ANESTHETIST, CERTIFIED REGISTERED

## 2018-07-18 PROCEDURE — 25010000002 PROPOFOL 10 MG/ML EMULSION: Performed by: NURSE ANESTHETIST, CERTIFIED REGISTERED

## 2018-07-18 RX ORDER — IPRATROPIUM BROMIDE AND ALBUTEROL SULFATE 2.5; .5 MG/3ML; MG/3ML
3 SOLUTION RESPIRATORY (INHALATION) ONCE AS NEEDED
Status: DISCONTINUED | OUTPATIENT
Start: 2018-07-18 | End: 2018-07-18 | Stop reason: HOSPADM

## 2018-07-18 RX ORDER — SODIUM CHLORIDE 0.9 % (FLUSH) 0.9 %
1-10 SYRINGE (ML) INJECTION AS NEEDED
Status: DISCONTINUED | OUTPATIENT
Start: 2018-07-18 | End: 2018-07-18 | Stop reason: HOSPADM

## 2018-07-18 RX ORDER — SODIUM CHLORIDE 9 MG/ML
INJECTION, SOLUTION INTRAVENOUS AS NEEDED
Status: DISCONTINUED | OUTPATIENT
Start: 2018-07-18 | End: 2018-07-18 | Stop reason: HOSPADM

## 2018-07-18 RX ORDER — FENTANYL CITRATE 50 UG/ML
INJECTION, SOLUTION INTRAMUSCULAR; INTRAVENOUS AS NEEDED
Status: DISCONTINUED | OUTPATIENT
Start: 2018-07-18 | End: 2018-07-18 | Stop reason: SURG

## 2018-07-18 RX ORDER — NEOSTIGMINE METHYLSULFATE 1 MG/ML
INJECTION, SOLUTION INTRAVENOUS AS NEEDED
Status: DISCONTINUED | OUTPATIENT
Start: 2018-07-18 | End: 2018-07-18 | Stop reason: SURG

## 2018-07-18 RX ORDER — HYDROCODONE BITARTRATE AND ACETAMINOPHEN 7.5; 325 MG/1; MG/1
1 TABLET ORAL EVERY 6 HOURS PRN
Qty: 30 TABLET | Refills: 0 | Status: SHIPPED | OUTPATIENT
Start: 2018-07-18 | End: 2020-08-27 | Stop reason: ALTCHOICE

## 2018-07-18 RX ORDER — ONDANSETRON 4 MG/1
4 TABLET, ORALLY DISINTEGRATING ORAL EVERY 8 HOURS PRN
Qty: 12 TABLET | Refills: 0 | Status: SHIPPED | OUTPATIENT
Start: 2018-07-18 | End: 2020-08-27 | Stop reason: ALTCHOICE

## 2018-07-18 RX ORDER — ROCURONIUM BROMIDE 10 MG/ML
INJECTION, SOLUTION INTRAVENOUS AS NEEDED
Status: DISCONTINUED | OUTPATIENT
Start: 2018-07-18 | End: 2018-07-18 | Stop reason: SURG

## 2018-07-18 RX ORDER — LIDOCAINE HYDROCHLORIDE AND EPINEPHRINE 10; 10 MG/ML; UG/ML
INJECTION, SOLUTION INFILTRATION; PERINEURAL AS NEEDED
Status: DISCONTINUED | OUTPATIENT
Start: 2018-07-18 | End: 2018-07-18 | Stop reason: HOSPADM

## 2018-07-18 RX ORDER — FENTANYL CITRATE 50 UG/ML
50 INJECTION, SOLUTION INTRAMUSCULAR; INTRAVENOUS
Status: DISCONTINUED | OUTPATIENT
Start: 2018-07-18 | End: 2018-07-18 | Stop reason: HOSPADM

## 2018-07-18 RX ORDER — DEXAMETHASONE SODIUM PHOSPHATE 10 MG/ML
INJECTION INTRAMUSCULAR; INTRAVENOUS AS NEEDED
Status: DISCONTINUED | OUTPATIENT
Start: 2018-07-18 | End: 2018-07-18 | Stop reason: SURG

## 2018-07-18 RX ORDER — SODIUM CHLORIDE, SODIUM LACTATE, POTASSIUM CHLORIDE, CALCIUM CHLORIDE 600; 310; 30; 20 MG/100ML; MG/100ML; MG/100ML; MG/100ML
125 INJECTION, SOLUTION INTRAVENOUS CONTINUOUS
Status: DISCONTINUED | OUTPATIENT
Start: 2018-07-18 | End: 2018-07-18 | Stop reason: HOSPADM

## 2018-07-18 RX ORDER — OXYCODONE HYDROCHLORIDE AND ACETAMINOPHEN 5; 325 MG/1; MG/1
1 TABLET ORAL ONCE AS NEEDED
Status: DISCONTINUED | OUTPATIENT
Start: 2018-07-18 | End: 2018-07-18 | Stop reason: HOSPADM

## 2018-07-18 RX ORDER — MEPERIDINE HYDROCHLORIDE 50 MG/ML
12.5 INJECTION INTRAMUSCULAR; INTRAVENOUS; SUBCUTANEOUS
Status: DISCONTINUED | OUTPATIENT
Start: 2018-07-18 | End: 2018-07-18 | Stop reason: HOSPADM

## 2018-07-18 RX ORDER — MIDAZOLAM HYDROCHLORIDE 1 MG/ML
INJECTION INTRAMUSCULAR; INTRAVENOUS AS NEEDED
Status: DISCONTINUED | OUTPATIENT
Start: 2018-07-18 | End: 2018-07-18 | Stop reason: SURG

## 2018-07-18 RX ORDER — ONDANSETRON 2 MG/ML
INJECTION INTRAMUSCULAR; INTRAVENOUS AS NEEDED
Status: DISCONTINUED | OUTPATIENT
Start: 2018-07-18 | End: 2018-07-18 | Stop reason: SURG

## 2018-07-18 RX ORDER — ONDANSETRON 2 MG/ML
4 INJECTION INTRAMUSCULAR; INTRAVENOUS ONCE AS NEEDED
Status: DISCONTINUED | OUTPATIENT
Start: 2018-07-18 | End: 2018-07-18 | Stop reason: HOSPADM

## 2018-07-18 RX ORDER — PROPOFOL 10 MG/ML
VIAL (ML) INTRAVENOUS AS NEEDED
Status: DISCONTINUED | OUTPATIENT
Start: 2018-07-18 | End: 2018-07-18 | Stop reason: SURG

## 2018-07-18 RX ORDER — GLYCOPYRROLATE 0.2 MG/ML
INJECTION INTRAMUSCULAR; INTRAVENOUS AS NEEDED
Status: DISCONTINUED | OUTPATIENT
Start: 2018-07-18 | End: 2018-07-18 | Stop reason: SURG

## 2018-07-18 RX ORDER — LIDOCAINE HYDROCHLORIDE 20 MG/ML
INJECTION, SOLUTION INFILTRATION; PERINEURAL AS NEEDED
Status: DISCONTINUED | OUTPATIENT
Start: 2018-07-18 | End: 2018-07-18 | Stop reason: SURG

## 2018-07-18 RX ADMIN — DEXAMETHASONE SODIUM PHOSPHATE 20 MG: 10 INJECTION INTRAMUSCULAR; INTRAVENOUS at 09:48

## 2018-07-18 RX ADMIN — ROCURONIUM BROMIDE 30 MG: 10 INJECTION INTRAVENOUS at 09:48

## 2018-07-18 RX ADMIN — LIDOCAINE HYDROCHLORIDE 60 MG: 20 INJECTION, SOLUTION INFILTRATION; PERINEURAL at 09:48

## 2018-07-18 RX ADMIN — GLYCOPYRROLATE 0.4 MG: 0.2 INJECTION, SOLUTION INTRAMUSCULAR; INTRAVENOUS at 10:13

## 2018-07-18 RX ADMIN — FENTANYL CITRATE 100 MCG: 50 INJECTION INTRAMUSCULAR; INTRAVENOUS at 09:41

## 2018-07-18 RX ADMIN — MIDAZOLAM HYDROCHLORIDE 2 MG: 1 INJECTION, SOLUTION INTRAMUSCULAR; INTRAVENOUS at 09:41

## 2018-07-18 RX ADMIN — SODIUM CHLORIDE, POTASSIUM CHLORIDE, SODIUM LACTATE AND CALCIUM CHLORIDE: 600; 310; 30; 20 INJECTION, SOLUTION INTRAVENOUS at 09:41

## 2018-07-18 RX ADMIN — PROPOFOL 150 MG: 10 INJECTION, EMULSION INTRAVENOUS at 09:48

## 2018-07-18 RX ADMIN — NEOSTIGMINE METHYLSULFATE 3 MG: 1 INJECTION, SOLUTION INTRAVENOUS at 10:13

## 2018-07-18 RX ADMIN — ONDANSETRON 4 MG: 2 INJECTION, SOLUTION INTRAMUSCULAR; INTRAVENOUS at 09:48

## 2018-07-18 RX ADMIN — GLYCOPYRROLATE 0.2 MG: 0.2 INJECTION, SOLUTION INTRAMUSCULAR; INTRAVENOUS at 09:56

## 2018-07-18 NOTE — ANESTHESIA POSTPROCEDURE EVALUATION
Patient: Diana Alfaro    Procedure Summary     Date:  07/18/18 Room / Location:  [facility] OR 09 /  [facility] OR    Anesthesia Start:  0941 Anesthesia Stop:  1023    Procedure:  SEPTOPLASTY ONLY (Bilateral Nose) Diagnosis:       Nasal septal deviation      (Nasal septal deviation [J34.2])    Surgeon:  Lorenzo Ordoñez MD Provider:  Ramy Schroeder MD    Anesthesia Type:  general ASA Status:  2          Anesthesia Type: general  Last vitals  BP   123/73 (07/18/18 1029)   Temp   97.3 °F (36.3 °C) (07/18/18 1024)   Pulse   79 (07/18/18 1029)   Resp   16 (07/18/18 1029)     SpO2   97 % (07/18/18 1029)     Post Anesthesia Care and Evaluation    Patient location during evaluation: PHASE II  Patient participation: complete - patient participated  Level of consciousness: awake and alert  Pain score: 1  Pain management: adequate  Airway patency: patent  Anesthetic complications: No anesthetic complications  PONV Status: controlled  Cardiovascular status: acceptable  Respiratory status: acceptable  Hydration status: acceptable

## 2018-07-18 NOTE — H&P (VIEW-ONLY)
Chief complaint: Nose  HPI: CT sinus results- normal/reviewed; states flonase is helping a lot; denies dizziness, ,bilatral tinnitus occasional      Review of Systems:    negative    History  Past Medical History:   Diagnosis Date   • Chest pain    • Gout    • Hyperlipidemia    • Hypertension      Past Surgical History:   Procedure Laterality Date   • CHOLECYSTECTOMY     • FOOT SURGERY      s/p clubfoot, R     Family History   Problem Relation Age of Onset   • Heart attack Mother    • Heart attack Father    • Heart attack Maternal Aunt    • Heart attack Maternal Uncle    • Heart attack Paternal Aunt    • Heart attack Paternal Uncle    • Heart attack Maternal Grandmother    • Heart attack Maternal Grandfather    • Heart attack Paternal Grandmother    • Heart attack Paternal Grandfather      Social History   Substance Use Topics   • Smoking status: Never Smoker   • Smokeless tobacco: Never Used   • Alcohol use No       (Not in a hospital admission)  Allergies:  Patient has no known allergies.    Objective     Vital Signs  Wt 132  Pulse 64  B/P 112/68    Physical Exam:      General Appearance:    Alert, cooperative, in no acute distress   Head:    Normocephalic, without obvious abnormality, atraumatic   Eyes:            Lids and lashes normal, conjunctivae and sclerae normal, no   icterus, no pallor, corneas clear, PERRLA   Ears:    Ears appear intact with no abnormalities noted   Nose:     Throat:   Nasal septum- deviated to the Rt; Turbinates- 3+ right, 2+ left;  Mucosa- normal    No oral lesions, no thrush, oral mucosa moist   Neck:   No adenopathy, supple, trachea midline, no thyromegaly, no   carotid bruit, no JVD    Thyroid- normal    Salivary Glands- normal       Lungs:     Clear to auscultation,respirations regular, even and                  unlabored    Heart:    Regular rhythm and normal rate, normal S1 and S2, no            murmur, no gallop, no rub, no click                                                                   Assessment  ETD bilateral  AR due to pollen  DNS Rt    Plan  Septoplasty              Lorenzo Ordoñez MD  06/26/18  11:40 AM

## 2018-07-18 NOTE — OP NOTE
PROCEDURE: Septoplasty     7/18/2018    Pre-op Diagnosis:   Nasal septal deviation [J34.2]    Post-op Diagnosis:     Post-Op Diagnosis Codes:     * Nasal septal deviation [J34.2]    Procedure(s):  SEPTOPLASTY ONLY    Surgeon(s):  Lorenzo Ordoñez MD    Anesthesia: General    Staff:   Circulator: Sushma Mario RN  Scrub Person: Cony Weems  Documenter: Ayad Griffin RN  Assistant: Portillo Mariano     Surgeon: Lorenzo Ordoñez MD     Anesthesia: General    Procedure Details: prepped and draped sterile fashion septum injected with 5 cc 1% lidocaine 1 100,000 epi.  Hemitransfixion incision made on the right and bilateral mucoperichondrial mucoperiosteal tunnels elevated.  Bony cartilaginous junction disarticulated and bony deformity removed with Anisha forceps of cartilage replaced in midline.  The transfixion incision and mucosal blankets were reapproximated with 4-0 chromic.    Grafts/Implants: none    Blood administered: None    Estimated Blood Loss: Minimal    Drains:  None    Specimens: None     Complications: None    Disposition: Improved    Condition: stable

## 2018-07-18 NOTE — ANESTHESIA PREPROCEDURE EVALUATION
Anesthesia Evaluation     Patient summary reviewed and Nursing notes reviewed   no history of anesthetic complications:  NPO Solid Status: > 8 hours  NPO Liquid Status: > 8 hours           Airway   Mallampati: II  TM distance: >3 FB  Neck ROM: full  no difficulty expected  Dental - normal exam     Pulmonary - negative pulmonary ROS and normal exam   (-) not a smoker  Cardiovascular - normal exam  Exercise tolerance: good (4-7 METS)    NYHA Classification: II  Patient on routine beta blocker and Beta blocker given within 24 hours of surgery    (+) hypertension, hyperlipidemia,   (-) past MI, dysrhythmias, angina, CHF      Neuro/Psych- negative ROS  (-) seizures, CVA  GI/Hepatic/Renal/Endo - negative ROS   (-) GERD, diabetes, hypothyroidism    Musculoskeletal     Abdominal  - normal exam    Bowel sounds: normal.   Substance History - negative use     OB/GYN negative ob/gyn ROS         Other   (+) arthritis     ROS/Med Hx Other: RIGHT CLUB FOOT                    Anesthesia Plan    ASA 2     general     intravenous induction   Anesthetic plan and risks discussed with patient.  Use of blood products discussed with patient  Consented to blood products.

## 2018-07-18 NOTE — ANESTHESIA PROCEDURE NOTES
Airway  Urgency: elective    Date/Time: 7/18/2018 9:49 AM  Airway not difficult    General Information and Staff    Patient location during procedure: OR  Anesthesiologist: NII HAYWARD  CRNA: EFREN NINO    Indications and Patient Condition  Indications for airway management: airway protection    Preoxygenated: yes  MILS maintained throughout  Mask difficulty assessment: 0 - not attempted    Final Airway Details  Final airway type: endotracheal airway      Successful airway: ETT  Cuffed: yes   Successful intubation technique: direct laryngoscopy  Facilitating devices/methods: intubating stylet  Endotracheal tube insertion site: oral  Blade: Mauricio  Blade size: #3  ETT size: 7.0 mm  Cormack-Lehane Classification: grade I - full view of glottis  Placement verified by: chest auscultation, capnometry and palpation of cuff   Cuff volume (mL): 10  Measured from: lips  ETT to lips (cm): 21  Number of attempts at approach: 1    Additional Comments  Dentition and lips same as preop

## 2019-01-10 ENCOUNTER — OFFICE VISIT (OUTPATIENT)
Dept: CARDIOLOGY | Facility: CLINIC | Age: 54
End: 2019-01-10

## 2019-01-10 VITALS
HEIGHT: 60 IN | SYSTOLIC BLOOD PRESSURE: 152 MMHG | WEIGHT: 140 LBS | HEART RATE: 78 BPM | DIASTOLIC BLOOD PRESSURE: 81 MMHG | BODY MASS INDEX: 27.48 KG/M2

## 2019-01-10 DIAGNOSIS — I10 ESSENTIAL HYPERTENSION: Primary | ICD-10-CM

## 2019-01-10 DIAGNOSIS — E78.5 DYSLIPIDEMIA: ICD-10-CM

## 2019-01-10 DIAGNOSIS — E78.2 MIXED HYPERLIPIDEMIA: ICD-10-CM

## 2019-01-10 PROCEDURE — 99213 OFFICE O/P EST LOW 20 MIN: CPT | Performed by: NURSE PRACTITIONER

## 2019-01-10 PROCEDURE — 93000 ELECTROCARDIOGRAM COMPLETE: CPT | Performed by: NURSE PRACTITIONER

## 2019-01-10 RX ORDER — LOSARTAN POTASSIUM 100 MG/1
100 TABLET ORAL DAILY
Qty: 30 TABLET | Refills: 11 | Status: SHIPPED | OUTPATIENT
Start: 2019-01-10 | End: 2019-08-08 | Stop reason: SDUPTHER

## 2019-01-10 RX ORDER — FLUTICASONE PROPIONATE 50 MCG
2 SPRAY, SUSPENSION (ML) NASAL DAILY
COMMUNITY

## 2019-01-10 NOTE — PROGRESS NOTES
Silver Shaw APRN  Diana Alfaro  1965  01/10/2019    Patient Active Problem List   Diagnosis   • Dyslipidemia   • Essential hypertension   • Hyperlipidemia   • Precordial pain   • Drug therapy changed   • Chest pain   • Nasal septal deviation       Dear Silver Shaw APRN:    Subjective     Chief Complaint   Patient presents with   • Chest Pain   • Med Management     list           History of Present Illness:    Diana Alfaro is a 53 y.o. female with a past medical history significant for hypertension, dyslipidemia, and previous complaints of chest pains. She was evaluated with a nuclear stress test and echocardiogram in March of 2018. These tests were unremarkable. She is here today for routine cardiology follow up. She reports she has been doing very well. She has been making healthier dietary choices. She is now participating in yoga. She has intentionally lost weight. She denies any further complaints of chest pain or shortness of breath. Her blood pressure is elevated in the office today. She reports she has only been taking 50 mg of the losartan instead of the 100 mg prescribed. She also stopped carvedilol due to bradycardia.          No Known Allergies:      Current Outpatient Medications:   •  aspirin 81 MG EC tablet, Take 81 mg by mouth Daily As Needed., Disp: , Rfl:   •  atorvastatin (LIPITOR) 40 MG tablet, Take 1 tablet by mouth Daily., Disp: 30 tablet, Rfl: 11  •  CALCIUM-MAG-VIT C-VIT D PO, Take  by mouth., Disp: , Rfl:   •  fluticasone (FLONASE) 50 MCG/ACT nasal spray, 2 sprays into the nostril(s) as directed by provider Daily., Disp: , Rfl:   •  ibuprofen (ADVIL,MOTRIN) 800 MG tablet, Take 800 mg by mouth Every 6 (Six) Hours As Needed for Mild Pain ., Disp: , Rfl:   •  KETOROLAC TROMETHAMINE PO, Take  by mouth., Disp: , Rfl:   •  losartan (COZAAR) 100 MG tablet, Take 1 tablet by mouth Daily., Disp: 30 tablet, Rfl: 11  •  Plant Sterol Stanol-Pantethine (CHOLESTOFF COMPLETE PO), Take 3  "tablets by mouth 2 (Two) Times a Day., Disp: , Rfl:   •  b complex-C-folic acid 1 MG capsule, Take 1 capsule by mouth Daily., Disp: , Rfl:   •  HYDROcodone-acetaminophen (NORCO) 7.5-325 MG per tablet, Take 1 tablet by mouth Every 6 (Six) Hours As Needed for Moderate Pain ., Disp: 30 tablet, Rfl: 0  •  Multiple Minerals (CALCIUM/MAGNESIUM/ZINC PO), Take  by mouth., Disp: , Rfl:   •  ondansetron ODT (ZOFRAN-ODT) 4 MG disintegrating tablet, Place 1 tablet on the tongue Every 8 (Eight) Hours As Needed for Nausea or Vomiting., Disp: 12 tablet, Rfl: 0      The following portions of the patient's history were reviewed and updated as appropriate: allergies, current medications, past family history, past medical history, past social history, past surgical history and problem list.    Social History     Tobacco Use   • Smoking status: Never Smoker   • Smokeless tobacco: Never Used   Substance Use Topics   • Alcohol use: No   • Drug use: No       Review of Systems   Constitution: Negative for weakness and malaise/fatigue.   Cardiovascular: Negative for chest pain, dyspnea on exertion, irregular heartbeat, leg swelling, near-syncope, palpitations and syncope.   Respiratory: Negative for cough, shortness of breath and wheezing.    Neurological: Negative for dizziness and light-headedness.       Objective   Vitals:    01/10/19 1341   BP: 152/81   BP Location: Right arm   Patient Position: Sitting   Cuff Size: Adult   Pulse: 78   Weight: 63.5 kg (140 lb)   Height: 152.4 cm (60\")     Body mass index is 27.34 kg/m².        Physical Exam   Constitutional: She is oriented to person, place, and time. She appears well-developed and well-nourished.   HENT:   Head: Normocephalic and atraumatic.   Neck: No JVD present.   Cardiovascular: Normal rate, regular rhythm and normal heart sounds. Exam reveals no S3 and no S4.   No murmur heard.  Pulmonary/Chest: Effort normal and breath sounds normal. She has no wheezes. She has no rales. "   Abdominal: Soft. Bowel sounds are normal.   Musculoskeletal: She exhibits no edema.   Braces noted BLE   Neurological: She is alert and oriented to person, place, and time.   Skin: Skin is warm and dry.   Psychiatric: She has a normal mood and affect. Her behavior is normal.       Lab Results   Component Value Date     07/17/2018    K 3.9 07/17/2018     07/17/2018    CO2 29.5 07/17/2018    BUN 20 07/17/2018    CREATININE 0.77 07/17/2018    GLUCOSE 87 07/17/2018    CALCIUM 9.3 07/17/2018    AST 27 03/05/2018    ALT 25 03/05/2018    ALKPHOS 64 03/05/2018     Lab Results   Component Value Date    CKTOTAL 61 03/05/2018     Lab Results   Component Value Date    WBC 6.33 07/17/2018    HGB 13.4 07/17/2018    HCT 40.7 07/17/2018     07/17/2018     Lab Results   Component Value Date    INR 0.89 (L) 03/04/2018    INR 0.89 05/25/2017       Lab Results   Component Value Date    TRIG 93 03/05/2018    HDL 43 (L) 03/05/2018     (H) 03/05/2018      Lab Results   Component Value Date    BNP 26.0 05/25/2017             ECG 12 Lead  Date/Time: 1/10/2019 1:47 PM  Performed by: Mena Martin CMA  Authorized by: Marion Wooten APRN   Comparison: compared with previous ECG   Similar to previous ECG  Rhythm: sinus rhythm              Assessment/Plan    Diagnosis Plan   1. Essential hypertension  ECG 12 Lead    losartan (COZAAR) 100 MG tablet   2. Mixed hyperlipidemia  ECG 12 Lead   3. Dyslipidemia  ECG 12 Lead                Recommendations:    1. Her blood pressure is not at goal. Will increase losartan to 100 mg daily. I have sent in a prescription for her. She will follow with her PCP and keep a check on her BP at home.    2. I have applauded her healthy dietary choices and encouraged her to continue this.    3. Follow up in 7 months and PRN        Return in about 7 months (around 8/10/2019) for Recheck.    As always, I appreciate very much the opportunity to participate in the cardiovascular care  of your patients.      With Best Regards,    TERI Mccracken

## 2019-07-08 NOTE — DISCHARGE INSTRUCTIONS
0700---7/18/18   ARRIVAL TIME    TAKE the following medications the morning of surgery:  All heart or blood pressure medications    HOLD all diabetic medications the morning of surgery as ordered by physician.    Please discontinue all blood thinners and anticoagulants (except aspirin) prior to surgery as per your surgeon and cardiologist instructions.  Aspirin may be continued up to the day prior to surgery.     CHLORHEXIDINE CLOTHS GIVEN WITH INSTRUCTIONS AND FORM TO RETURN TO HOSPITAL    General Instructions:  · Do not eat or drink after midnight: includes water, mints, or gum. You may brush your teeth.  Dental appliances that are removable must be taken out day of surgery.  · Do not smoke, chew tobacco, or drink alcohol.  · Bring medications in original bottles, any inhalers and if applicable your C-PAP/BI-PAP machine.  · Bring any papers given to you in the doctor's office.  · Wear clean comfortable clothes and socks.  · Do not wear contact lenses or make-up. Bring a case for your glasses if applicable.  · Bring crutches or walker if applicable.  · Leave all other valuables and jewelry at home.    If you were given a blood bank ID arm band remember to bring it with you the day of surgery.    Preventing a Surgical Site Infection:  Shower the night before surgery (unless instructed other wise) using a fresh bar of anti-bacterial soap (such as Dial) and clean washcloth. Dry with a clean towel and dress in clean clothing.  For 2 to 3 days before surgery, avoid shaving with a razor near where you will have surgery because the razor can irritate skin and make it easier to develop an infection. Ask your surgeon if you will be receiving antibiotics prior to surgery.  Make sure you, your family, and all healthcare providers clear their hands with soap and water or an alcohol-based hand  before caring for you or your wound.  If at all possible, quit smoking as many days before surgery as you can.    Day of  Patient comes in with complaints that she has not felt baby move since last night.  She denies contractions leaking or bleeding. Monitors applied.  Fetal heart tones tracing.  Fetal movement seen, heard on monitor, and felt by RN.  Patient feeling some of the movements.  Dr Latif called.  Patient to be discharged.  Discharge instructions given.  Patient ambulated out.    surgery:  Upon arrival, a Pre-op nurse and Anesthesiologist will review your health history, obtain vital signs, and answer questions you may have. The only belongings needed at this time will be your home medications and if applicable your C-PAP/BI-PAP machine. If you are staying overnight your family can leave the rest of your belongings in the car and bring them to your room later. A Pre-op nurse will start an IV and you may receive medication in preparation for surgery, including something to help you relax. Your family will be able to see you in the Pre-op area. While you are in surgery your family should notify the waiting room  if they leave the waiting room area and provide a contact phone number.    Please be aware that surgery does come with discomfort. We want to make every effort to control your discomfort so please discuss any uncontrolled symptoms with your nurse. Your doctor will most likely have prescribed pain medications.    If you are going home after surgery you will receive individualized written care instructions before being discharged. A responsible adult must drive you to and from the hospital on the day of surgery and stay with you for 24 hours.    If you are staying overnight following surgery, you will be transported to your hospital room following the recovery period.  [facility] has all private rooms.    If you have any questions please call Pre-Admission Testing at [phone].  Deductibles and co-payments are collected on the day of service. Please be prepared to pay the required co-pay, deductible or deposit on the day of service as defined by your plan.

## 2019-07-23 RX ORDER — ATORVASTATIN CALCIUM 40 MG/1
40 TABLET, FILM COATED ORAL DAILY
Qty: 30 TABLET | Refills: 2 | Status: SHIPPED | OUTPATIENT
Start: 2019-07-23 | End: 2019-12-23 | Stop reason: SDUPTHER

## 2019-08-08 ENCOUNTER — OFFICE VISIT (OUTPATIENT)
Dept: CARDIOLOGY | Facility: CLINIC | Age: 54
End: 2019-08-08

## 2019-08-08 VITALS
HEART RATE: 78 BPM | HEIGHT: 60 IN | DIASTOLIC BLOOD PRESSURE: 80 MMHG | BODY MASS INDEX: 28.47 KG/M2 | SYSTOLIC BLOOD PRESSURE: 159 MMHG | WEIGHT: 145 LBS

## 2019-08-08 DIAGNOSIS — I10 ESSENTIAL HYPERTENSION: ICD-10-CM

## 2019-08-08 DIAGNOSIS — I10 ESSENTIAL HYPERTENSION: Primary | ICD-10-CM

## 2019-08-08 DIAGNOSIS — E78.2 MIXED HYPERLIPIDEMIA: ICD-10-CM

## 2019-08-08 PROCEDURE — 99214 OFFICE O/P EST MOD 30 MIN: CPT | Performed by: NURSE PRACTITIONER

## 2019-08-08 PROCEDURE — 93000 ELECTROCARDIOGRAM COMPLETE: CPT | Performed by: NURSE PRACTITIONER

## 2019-08-08 RX ORDER — HYDRALAZINE HYDROCHLORIDE 10 MG/1
10 TABLET, FILM COATED ORAL 3 TIMES DAILY
Qty: 90 TABLET | Refills: 5 | Status: SHIPPED | OUTPATIENT
Start: 2019-08-08 | End: 2021-06-10

## 2019-08-08 RX ORDER — HYDRALAZINE HYDROCHLORIDE 10 MG/1
10 TABLET, FILM COATED ORAL 3 TIMES DAILY
Qty: 90 TABLET | Refills: 5 | Status: SHIPPED | OUTPATIENT
Start: 2019-08-08 | End: 2019-08-08 | Stop reason: SDUPTHER

## 2019-08-08 RX ORDER — LOSARTAN POTASSIUM 50 MG/1
50 TABLET ORAL 2 TIMES DAILY
Qty: 60 TABLET | Refills: 5 | Status: SHIPPED | OUTPATIENT
Start: 2019-08-08

## 2019-08-08 RX ORDER — LOSARTAN POTASSIUM 50 MG/1
50 TABLET ORAL 2 TIMES DAILY
Qty: 60 TABLET | Refills: 5 | Status: SHIPPED | OUTPATIENT
Start: 2019-08-08 | End: 2019-08-08 | Stop reason: SDUPTHER

## 2019-08-08 NOTE — PROGRESS NOTES
Silver Shaw APRN  Diana Alfaro  1965  08/08/2019    Patient Active Problem List   Diagnosis   • Dyslipidemia   • Essential hypertension   • Hyperlipidemia   • Precordial pain   • Drug therapy changed   • Chest pain   • Nasal septal deviation       Dear Silver Shaw APRN:    Subjective     Chief Complaint   Patient presents with   • Hypertension     follow up   • Med Management           History of Present Illness:    Diana Alfaro is a 53 y.o. female with a past medical history significant for hypertension and dyslipidemia.  Previously, she was having chest pains and evaluated with stress test and echocardiogram.  These were unremarkable.  She reports she has had no further chest pains or shortness of breath.  She feels fairly well.  She denies any dizziness, lightheadedness, near-syncope, or syncope.  Her blood pressure is elevated in the office today.  She reports she does not monitor routinely at home.  She tries to eat a low-sodium diet and is physically active.  She has been on multiple medications in the past for her blood pressure but has been unable to tolerate these including HCTZ, amlodipine, metoprolol, and carvedilol.  She is currently taking losartan 100 mg daily and states she feels dizzy when she takes all the medication at once.  She would like to try taking losartan 50 mg twice daily. She reports PCP obtains labs every 6 months.        No Known Allergies:      Current Outpatient Medications:   •  atorvastatin (LIPITOR) 40 MG tablet, Take 1 tablet by mouth Daily., Disp: 30 tablet, Rfl: 2  •  CALCIUM-MAG-VIT C-VIT D PO, Take  by mouth., Disp: , Rfl:   •  fluticasone (FLONASE) 50 MCG/ACT nasal spray, 2 sprays into the nostril(s) as directed by provider Daily., Disp: , Rfl:   •  ibuprofen (ADVIL,MOTRIN) 800 MG tablet, Take 800 mg by mouth Every 6 (Six) Hours As Needed for Mild Pain ., Disp: , Rfl:   •  KETOROLAC TROMETHAMINE PO, Take  by mouth., Disp: , Rfl:   •  Multiple Minerals  "(CALCIUM/MAGNESIUM/ZINC PO), Take  by mouth., Disp: , Rfl:   •  ondansetron ODT (ZOFRAN-ODT) 4 MG disintegrating tablet, Place 1 tablet on the tongue Every 8 (Eight) Hours As Needed for Nausea or Vomiting., Disp: 12 tablet, Rfl: 0  •  Plant Sterol Stanol-Pantethine (CHOLESTOFF COMPLETE PO), Take 3 tablets by mouth 2 (Two) Times a Day., Disp: , Rfl:   •  hydrALAZINE (APRESOLINE) 10 MG tablet, Take 1 tablet by mouth 3 (Three) Times a Day., Disp: 90 tablet, Rfl: 5  •  HYDROcodone-acetaminophen (NORCO) 7.5-325 MG per tablet, Take 1 tablet by mouth Every 6 (Six) Hours As Needed for Moderate Pain ., Disp: 30 tablet, Rfl: 0  •  losartan (COZAAR) 50 MG tablet, Take 1 tablet by mouth 2 (Two) Times a Day., Disp: 60 tablet, Rfl: 5      The following portions of the patient's history were reviewed and updated as appropriate: allergies, current medications, past family history, past medical history, past social history, past surgical history and problem list.    Social History     Tobacco Use   • Smoking status: Never Smoker   • Smokeless tobacco: Never Used   Substance Use Topics   • Alcohol use: No   • Drug use: No       Review of Systems   Constitution: Negative for weakness and malaise/fatigue.   Cardiovascular: Negative for chest pain, dyspnea on exertion, irregular heartbeat, leg swelling, near-syncope, orthopnea, palpitations, paroxysmal nocturnal dyspnea and syncope.   Respiratory: Negative for cough, shortness of breath and wheezing.    Neurological: Negative for dizziness and light-headedness.       Objective   Vitals:    08/08/19 1216   BP: 159/80   BP Location: Right arm   Patient Position: Sitting   Cuff Size: Adult   Pulse: 78   Weight: 65.8 kg (145 lb)   Height: 152.4 cm (60\")     Body mass index is 28.32 kg/m².        Physical Exam   Constitutional: She is oriented to person, place, and time. She appears well-developed and well-nourished.   HENT:   Head: Normocephalic and atraumatic.   Cardiovascular: Normal " rate, regular rhythm and normal heart sounds. Exam reveals no S3 and no S4.   No murmur heard.  Pulmonary/Chest: Effort normal and breath sounds normal. She has no wheezes. She has no rales.   Abdominal: Soft. Bowel sounds are normal.   Musculoskeletal: She exhibits no edema.   Antalgic gait   Neurological: She is alert and oriented to person, place, and time.   Skin: Skin is warm and dry.   Psychiatric: She has a normal mood and affect. Her behavior is normal.             ECG 12 Lead  Date/Time: 8/8/2019 12:16 PM  Performed by: Mena Martin CMA  Authorized by: Marion Wooten APRN   Comparison: compared with previous ECG   Similar to previous ECG  Rhythm: sinus rhythm  BPM: 72                Assessment/Plan    Diagnosis Plan   1. Essential hypertension  ECG 12 Lead   2. Mixed hyperlipidemia  ECG 12 Lead                Recommendations:    1. Will change losartan to 50 mg BID.    2. Start hydralazine 10 mg TID for uncontrolled blood pressure.    3. Follow up in 3 months and PRN.          Patient's Body mass index is 28.32 kg/m². BMI is above normal parameters. Recommendations include: nutrition counseling.         Return in about 3 months (around 11/8/2019) for Recheck.    As always, I appreciate very much the opportunity to participate in the cardiovascular care of your patients.      With Best Regards,    TERI Mccracken

## 2019-08-16 ENCOUNTER — TELEPHONE (OUTPATIENT)
Dept: CARDIOLOGY | Facility: CLINIC | Age: 54
End: 2019-08-16

## 2019-08-16 NOTE — TELEPHONE ENCOUNTER
Called pharmacy and spoke with Sindi she stated that her insurance will pay for the Losartan 100 mg 1/2 tab  BID but not the 50 mg BID. Called Diana to advise her no answer.     Brittney stated that when Diana called she advised her to check back with her pharmacy later today.

## 2019-08-16 NOTE — TELEPHONE ENCOUNTER
Patient called and said that her Losartan needs an authorization. She said the pharmacy was faxing something over to us. Can someone check on this? The patient ask if someone can call the patient back when they know if its authorized or not.     Thanks!

## 2019-12-18 ENCOUNTER — OFFICE VISIT (OUTPATIENT)
Dept: CARDIOLOGY | Facility: CLINIC | Age: 54
End: 2019-12-18

## 2019-12-18 VITALS
HEIGHT: 60 IN | BODY MASS INDEX: 29.06 KG/M2 | HEART RATE: 86 BPM | OXYGEN SATURATION: 98 % | SYSTOLIC BLOOD PRESSURE: 163 MMHG | DIASTOLIC BLOOD PRESSURE: 83 MMHG | WEIGHT: 148 LBS

## 2019-12-18 DIAGNOSIS — E78.5 DYSLIPIDEMIA: ICD-10-CM

## 2019-12-18 DIAGNOSIS — I10 ESSENTIAL HYPERTENSION: Primary | ICD-10-CM

## 2019-12-18 PROCEDURE — 99213 OFFICE O/P EST LOW 20 MIN: CPT | Performed by: NURSE PRACTITIONER

## 2019-12-18 RX ORDER — LORATADINE 10 MG/1
TABLET ORAL DAILY
Refills: 5 | COMMUNITY
Start: 2019-11-14 | End: 2021-10-05 | Stop reason: ALTCHOICE

## 2019-12-18 NOTE — PROGRESS NOTES
Silver Shaw APRN  Diana Alfaro  1965  12/18/2019    Patient Active Problem List   Diagnosis   • Dyslipidemia   • Essential hypertension   • Hyperlipidemia   • Precordial pain   • Drug therapy changed   • Chest pain   • Nasal septal deviation       Dear Silver Shaw APRN:    Subjective     Chief Complaint   Patient presents with   • Essential hypertension           History of Present Illness:    Diana Alfaro is a 54 y.o. female with a history of hypertension dyslipidemia.  She presents today for routine cardiology follow-up.  She denies any chest pains, palpitations, shortness of breath, dizziness, or lightheadedness.  Previously, her blood pressure was uncontrolled.  Losartan dosage was increased and hydralazine added.  However, the patient reports she did not make these medication changes.  She believes her hypertension is solely related to her diet and plans to make dietary changes.  She has not had any recent labs.          No Known Allergies:      Current Outpatient Medications:   •  atorvastatin (LIPITOR) 40 MG tablet, Take 1 tablet by mouth Daily., Disp: 30 tablet, Rfl: 2  •  CALCIUM-MAG-VIT C-VIT D PO, Take  by mouth., Disp: , Rfl:   •  loratadine (CLARITIN) 10 MG tablet, Take  by mouth Daily., Disp: , Rfl: 5  •  losartan (COZAAR) 50 MG tablet, Take 1 tablet by mouth 2 (Two) Times a Day., Disp: 60 tablet, Rfl: 5  •  Multiple Minerals (CALCIUM/MAGNESIUM/ZINC PO), Take  by mouth., Disp: , Rfl:   •  fluticasone (FLONASE) 50 MCG/ACT nasal spray, 2 sprays into the nostril(s) as directed by provider Daily., Disp: , Rfl:   •  hydrALAZINE (APRESOLINE) 10 MG tablet, Take 1 tablet by mouth 3 (Three) Times a Day., Disp: 90 tablet, Rfl: 5  •  HYDROcodone-acetaminophen (NORCO) 7.5-325 MG per tablet, Take 1 tablet by mouth Every 6 (Six) Hours As Needed for Moderate Pain ., Disp: 30 tablet, Rfl: 0  •  ibuprofen (ADVIL,MOTRIN) 800 MG tablet, Take 800 mg by mouth Every 6 (Six) Hours As Needed for Mild Pain  "., Disp: , Rfl:   •  KETOROLAC TROMETHAMINE PO, Take  by mouth., Disp: , Rfl:   •  ondansetron ODT (ZOFRAN-ODT) 4 MG disintegrating tablet, Place 1 tablet on the tongue Every 8 (Eight) Hours As Needed for Nausea or Vomiting., Disp: 12 tablet, Rfl: 0  •  Plant Sterol Stanol-Pantethine (CHOLESTOFF COMPLETE PO), Take 3 tablets by mouth 2 (Two) Times a Day., Disp: , Rfl:       The following portions of the patient's history were reviewed and updated as appropriate: allergies, current medications, past family history, past medical history, past social history, past surgical history and problem list.    Social History     Tobacco Use   • Smoking status: Never Smoker   • Smokeless tobacco: Never Used   Substance Use Topics   • Alcohol use: No   • Drug use: No       Review of Systems   Constitution: Negative for malaise/fatigue.   Cardiovascular: Negative for chest pain, dyspnea on exertion, irregular heartbeat, leg swelling, near-syncope, orthopnea, palpitations, paroxysmal nocturnal dyspnea and syncope.   Respiratory: Negative for cough, shortness of breath and wheezing.    Neurological: Negative for dizziness, light-headedness and weakness.       Objective   Vitals:    12/18/19 1303 12/18/19 1326   BP: 163/83    Pulse: 86    SpO2: 98%    Weight: 62.6 kg (138 lb) 67.1 kg (148 lb)   Height: 152.4 cm (60\")      Body mass index is 28.9 kg/m².        Physical Exam   Constitutional: She is oriented to person, place, and time. She appears well-developed and well-nourished.   HENT:   Head: Normocephalic and atraumatic.   Cardiovascular: Normal rate, regular rhythm and normal heart sounds. Exam reveals no S3 and no S4.   No murmur heard.  Pulmonary/Chest: Effort normal and breath sounds normal. She has no wheezes. She has no rales.   Abdominal: Soft. Bowel sounds are normal.   Musculoskeletal: She exhibits no edema.   Neurological: She is alert and oriented to person, place, and time.   Skin: Skin is warm and dry.   Psychiatric: " She has a normal mood and affect. Her behavior is normal.       Lab Results   Component Value Date     07/17/2018    K 3.9 07/17/2018     07/17/2018    CO2 29.5 07/17/2018    BUN 20 07/17/2018    CREATININE 0.77 07/17/2018    GLUCOSE 87 07/17/2018    CALCIUM 9.3 07/17/2018    AST 27 03/05/2018    ALT 25 03/05/2018    ALKPHOS 64 03/05/2018     Lab Results   Component Value Date    CKTOTAL 61 03/05/2018     Lab Results   Component Value Date    WBC 6.33 07/17/2018    HGB 13.4 07/17/2018    HCT 40.7 07/17/2018     07/17/2018     Lab Results   Component Value Date    INR 0.89 (L) 03/04/2018    INR 0.89 05/25/2017        Lab Results   Component Value Date    TRIG 93 03/05/2018    HDL 43 (L) 03/05/2018     (H) 03/05/2018      Lab Results   Component Value Date    BNP 26.0 05/25/2017           Procedures      Assessment/Plan    Diagnosis Plan   1. Essential hypertension  Comprehensive Metabolic Panel    Lipid Panel   2. Dyslipidemia  Comprehensive Metabolic Panel    Lipid Panel                Recommendations:    I have again recommended initiating hydralazine for her uncontrolled hypertension.  However, the patient adamantly refuses to take any more blood pressure medication or make any changes to her current medication.  I have discussed with her the consequences of longstanding uncontrolled hypertension.  She voices understanding.  CMP and lipid panel ordered.  She will follow-up here in 6 months or sooner if needed.       Return in about 6 months (around 6/18/2020) for Recheck.    As always, I appreciate very much the opportunity to participate in the cardiovascular care of your patients.      With Best Regards,    TERI Mccracken

## 2019-12-23 RX ORDER — ATORVASTATIN CALCIUM 80 MG/1
80 TABLET, FILM COATED ORAL DAILY
Qty: 30 TABLET | Refills: 2 | Status: SHIPPED | OUTPATIENT
Start: 2019-12-23 | End: 2021-10-05 | Stop reason: DRUGHIGH

## 2020-08-27 ENCOUNTER — OFFICE VISIT (OUTPATIENT)
Dept: CARDIOLOGY | Facility: CLINIC | Age: 55
End: 2020-08-27

## 2020-08-27 DIAGNOSIS — I10 ESSENTIAL HYPERTENSION: Primary | ICD-10-CM

## 2020-08-27 DIAGNOSIS — E78.5 DYSLIPIDEMIA: ICD-10-CM

## 2020-08-27 PROCEDURE — 99213 OFFICE O/P EST LOW 20 MIN: CPT | Performed by: NURSE PRACTITIONER

## 2020-08-27 NOTE — PROGRESS NOTES
You have chosen to receive care through a telephone visit. Do you consent to use a telephone visit for your medical care today? Yes    Silver Shaw APRN  Diana Alfaro  1965  08/27/2020    Patient Active Problem List   Diagnosis   • Dyslipidemia   • Essential hypertension   • Hyperlipidemia   • Precordial pain   • Drug therapy changed   • Chest pain   • Nasal septal deviation       Dear Silver Shaw APRN:    Subjective     Chief Complaint   Patient presents with   • Follow-up     ROUTINE   • Med Management     VERBAL           History of Present Illness:    Diana Alfaro is a 54 y.o. female with a history of hypertension and  dyslipidemia. She presents today for cardiology follow up via telephone visit due to the ongoing pandemic. The patient denies chest pain, shortness of breath, palpitations, dizziness, lightheadedness, near syncope, and syncope. /74 today at PCP office. Her PCP ordered labs today including a lipid panel.     No Known Allergies:      Current Outpatient Medications:   •  atorvastatin (LIPITOR) 80 MG tablet, Take 1 tablet by mouth Daily. (Patient taking differently: Take 80 mg by mouth Daily. PATIENT TAKING 20MG), Disp: 30 tablet, Rfl: 2  •  fluticasone (FLONASE) 50 MCG/ACT nasal spray, 2 sprays into the nostril(s) as directed by provider Daily., Disp: , Rfl:   •  ibuprofen (ADVIL,MOTRIN) 800 MG tablet, Take 800 mg by mouth Every 6 (Six) Hours As Needed for Mild Pain ., Disp: , Rfl:   •  loratadine (CLARITIN) 10 MG tablet, Take  by mouth Daily., Disp: , Rfl: 5  •  losartan (COZAAR) 50 MG tablet, Take 1 tablet by mouth 2 (Two) Times a Day., Disp: 60 tablet, Rfl: 5  •  Multiple Minerals (CALCIUM/MAGNESIUM/ZINC PO), Take  by mouth., Disp: , Rfl:   •  Plant Sterol Stanol-Pantethine (CHOLESTOFF COMPLETE PO), Take 3 tablets by mouth 2 (Two) Times a Day., Disp: , Rfl:   •  CALCIUM-MAG-VIT C-VIT D PO, Take  by mouth., Disp: , Rfl:   •  hydrALAZINE (APRESOLINE) 10 MG tablet, Take 1  tablet by mouth 3 (Three) Times a Day., Disp: 90 tablet, Rfl: 5      The following portions of the patient's history were reviewed and updated as appropriate: allergies, current medications, past family history, past medical history, past social history, past surgical history and problem list.    Social History     Tobacco Use   • Smoking status: Never Smoker   • Smokeless tobacco: Never Used   Substance Use Topics   • Alcohol use: No   • Drug use: No       Review of Systems   Constitution: Negative for decreased appetite and malaise/fatigue.   Cardiovascular: Negative for chest pain, dyspnea on exertion, irregular heartbeat, leg swelling, near-syncope, orthopnea, palpitations, paroxysmal nocturnal dyspnea and syncope.   Respiratory: Negative for cough, shortness of breath and wheezing.    Neurological: Negative for dizziness, light-headedness and weakness.       Objective   There were no vitals filed for this visit.  There is no height or weight on file to calculate BMI.        Physical Exam    Lab Results   Component Value Date     07/17/2018    K 3.9 07/17/2018     07/17/2018    CO2 29.5 07/17/2018    BUN 20 07/17/2018    CREATININE 0.77 07/17/2018    GLUCOSE 87 07/17/2018    CALCIUM 9.3 07/17/2018    AST 27 03/05/2018    ALT 25 03/05/2018    ALKPHOS 64 03/05/2018     Lab Results   Component Value Date    CKTOTAL 61 03/05/2018     Lab Results   Component Value Date    WBC 6.33 07/17/2018    HGB 13.4 07/17/2018    HCT 40.7 07/17/2018     07/17/2018     Lab Results   Component Value Date    INR 0.89 (L) 03/04/2018    INR 0.89 05/25/2017     No results found for: MG  Lab Results   Component Value Date    TRIG 93 03/05/2018    HDL 43 (L) 03/05/2018     (H) 03/05/2018      Lab Results   Component Value Date    BNP 26.0 05/25/2017           Procedures      Assessment/Plan    Diagnosis Plan   1. Essential hypertension     2. Dyslipidemia                  Recommendations:    1. Continue  atorvastatin, losartan, and hydralazine.    2. I have asked her to have labs sent to our office when she completes these as well.    3. Follow up in 6 months or sooner if needed.      This visit has been rescheduled as a phone visit to comply with patient safety concerns in accordance with CDC recommendations. Total time of discussion was 7 minutes.        No follow-ups on file.    As always, I appreciate very much the opportunity to participate in the cardiovascular care of your patients.      With Best Regards,    Marion Wooten, APRN

## 2020-10-28 ENCOUNTER — TELEPHONE (OUTPATIENT)
Dept: CARDIOLOGY | Facility: CLINIC | Age: 55
End: 2020-10-28

## 2020-10-28 NOTE — TELEPHONE ENCOUNTER
Pt called back saying her ankles is still bothering her wanted to be seen today but I told her we could get her in tomorrow advised her to go to pcp

## 2020-12-01 ENCOUNTER — TELEPHONE (OUTPATIENT)
Dept: CARDIOLOGY | Facility: CLINIC | Age: 55
End: 2020-12-01

## 2020-12-04 ENCOUNTER — TRANSCRIBE ORDERS (OUTPATIENT)
Dept: ADMINISTRATIVE | Facility: HOSPITAL | Age: 55
End: 2020-12-04

## 2020-12-04 DIAGNOSIS — R60.9 EDEMA, UNSPECIFIED TYPE: Primary | ICD-10-CM

## 2020-12-04 DIAGNOSIS — R07.9 CHEST PAIN, UNSPECIFIED TYPE: ICD-10-CM

## 2020-12-17 ENCOUNTER — APPOINTMENT (OUTPATIENT)
Dept: CARDIOLOGY | Facility: HOSPITAL | Age: 55
End: 2020-12-17

## 2020-12-22 ENCOUNTER — HOSPITAL ENCOUNTER (OUTPATIENT)
Dept: CARDIOLOGY | Facility: HOSPITAL | Age: 55
Discharge: HOME | End: 2020-12-22
Admitting: INTERNAL MEDICINE

## 2020-12-22 ENCOUNTER — APPOINTMENT (OUTPATIENT)
Dept: CARDIOLOGY | Facility: HOSPITAL | Age: 55
End: 2020-12-22

## 2020-12-22 VITALS
DIASTOLIC BLOOD PRESSURE: 67 MMHG | SYSTOLIC BLOOD PRESSURE: 157 MMHG | HEART RATE: 114 BPM | WEIGHT: 140 LBS | BODY MASS INDEX: 27.48 KG/M2 | HEIGHT: 60 IN

## 2020-12-22 DIAGNOSIS — R07.9 CHEST PAIN, UNSPECIFIED TYPE: ICD-10-CM

## 2020-12-22 PROCEDURE — 25010000002 DOBUTAMINE PER 250 MG: Performed by: INTERNAL MEDICINE

## 2020-12-22 PROCEDURE — 93325 DOPPLER ECHO COLOR FLOW MAPG: CPT

## 2020-12-22 PROCEDURE — 93320 DOPPLER ECHO COMPLETE: CPT | Performed by: INTERNAL MEDICINE

## 2020-12-22 PROCEDURE — 93325 DOPPLER ECHO COLOR FLOW MAPG: CPT | Performed by: INTERNAL MEDICINE

## 2020-12-22 PROCEDURE — 93351 STRESS TTE COMPLETE: CPT

## 2020-12-22 PROCEDURE — 93320 DOPPLER ECHO COMPLETE: CPT

## 2020-12-22 PROCEDURE — 93351 STRESS TTE COMPLETE: CPT | Performed by: INTERNAL MEDICINE

## 2020-12-22 RX ORDER — DOBUTAMINE HYDROCHLORIDE 200 MG/100ML
10 INJECTION INTRAVENOUS
Status: COMPLETED | OUTPATIENT
Start: 2020-12-22 | End: 2020-12-22

## 2020-12-22 RX ADMIN — DOBUTAMINE HYDROCHLORIDE 10 MCG/KG/MIN: 200 INJECTION INTRAVENOUS at 14:47

## 2020-12-23 LAB
BH CV ECHO MEAS - ACS: 1.7 CM
BH CV ECHO MEAS - AO ROOT AREA (BSA CORRECTED): 1.4
BH CV ECHO MEAS - AO ROOT AREA: 4 CM^2
BH CV ECHO MEAS - AO ROOT DIAM: 2.3 CM
BH CV ECHO MEAS - BSA(HAYCOCK): 1.7 M^2
BH CV ECHO MEAS - BSA: 1.6 M^2
BH CV ECHO MEAS - BZI_BMI: 28.9 KILOGRAMS/M^2
BH CV ECHO MEAS - BZI_METRIC_HEIGHT: 152.4 CM
BH CV ECHO MEAS - BZI_METRIC_WEIGHT: 67.1 KG
BH CV ECHO MEAS - EDV(CUBED): 64 ML
BH CV ECHO MEAS - EDV(MOD-SP4): 49.6 ML
BH CV ECHO MEAS - EDV(TEICH): 70 ML
BH CV ECHO MEAS - EF(CUBED): 75.6 %
BH CV ECHO MEAS - EF(MOD-SP4): 68.1 %
BH CV ECHO MEAS - EF(TEICH): 68.1 %
BH CV ECHO MEAS - ESV(CUBED): 15.6 ML
BH CV ECHO MEAS - ESV(MOD-SP4): 15.8 ML
BH CV ECHO MEAS - ESV(TEICH): 22.3 ML
BH CV ECHO MEAS - FS: 37.5 %
BH CV ECHO MEAS - IVS/LVPW: 0.82
BH CV ECHO MEAS - IVSD: 0.9 CM
BH CV ECHO MEAS - LA DIMENSION: 3.6 CM
BH CV ECHO MEAS - LA/AO: 1.6
BH CV ECHO MEAS - LV DIASTOLIC VOL/BSA (35-75): 30.2 ML/M^2
BH CV ECHO MEAS - LV MASS(C)D: 127.1 GRAMS
BH CV ECHO MEAS - LV MASS(C)DI: 77.4 GRAMS/M^2
BH CV ECHO MEAS - LV SYSTOLIC VOL/BSA (12-30): 9.6 ML/M^2
BH CV ECHO MEAS - LVIDD: 4 CM
BH CV ECHO MEAS - LVIDS: 2.5 CM
BH CV ECHO MEAS - LVLD AP4: 6.6 CM
BH CV ECHO MEAS - LVLS AP4: 5.5 CM
BH CV ECHO MEAS - LVOT AREA (M): 1.8 CM^2
BH CV ECHO MEAS - LVOT AREA: 1.8 CM^2
BH CV ECHO MEAS - LVOT DIAM: 1.5 CM
BH CV ECHO MEAS - LVPWD: 1.1 CM
BH CV ECHO MEAS - MV A MAX VEL: 84.4 CM/SEC
BH CV ECHO MEAS - MV E MAX VEL: 94.7 CM/SEC
BH CV ECHO MEAS - MV E/A: 1.1
BH CV ECHO MEAS - PA ACC TIME: 0.11 SEC
BH CV ECHO MEAS - PA PR(ACCEL): 31.3 MMHG
BH CV ECHO MEAS - SI(CUBED): 29.5 ML/M^2
BH CV ECHO MEAS - SI(MOD-SP4): 20.6 ML/M^2
BH CV ECHO MEAS - SI(TEICH): 29 ML/M^2
BH CV ECHO MEAS - SV(CUBED): 48.4 ML
BH CV ECHO MEAS - SV(MOD-SP4): 33.8 ML
BH CV ECHO MEAS - SV(TEICH): 47.7 ML
BH CV STRESS BP STAGE 1: NORMAL
BH CV STRESS BP STAGE 2: NORMAL
BH CV STRESS BP STAGE 3: NORMAL
BH CV STRESS DOSE DOBUTAMINE STAGE 1: 10
BH CV STRESS DOSE DOBUTAMINE STAGE 2: 20
BH CV STRESS DOSE DOBUTAMINE STAGE 3: 30
BH CV STRESS DURATION MIN STAGE 1: 2
BH CV STRESS DURATION MIN STAGE 2: 3
BH CV STRESS DURATION MIN STAGE 3: 3
BH CV STRESS DURATION SEC STAGE 1: 0
BH CV STRESS DURATION SEC STAGE 2: 0
BH CV STRESS DURATION SEC STAGE 3: 0
BH CV STRESS ECHO POST STRESS EJECTION FRACTION EF: 75 %
BH CV STRESS HR STAGE 1: 77
BH CV STRESS HR STAGE 2: 117
BH CV STRESS HR STAGE 3: 135
BH CV STRESS PROTOCOL 1: NORMAL
BH CV STRESS RECOVERY BP: NORMAL MMHG
BH CV STRESS RECOVERY HR: 98 BPM
BH CV STRESS STAGE 1: 1
BH CV STRESS STAGE 2: 2
BH CV STRESS STAGE 3: 3
MAXIMAL PREDICTED HEART RATE: 163 BPM
PERCENT MAX PREDICTED HR: 82.82 %
STRESS BASELINE BP: NORMAL MMHG
STRESS BASELINE HR: 73 BPM
STRESS PERCENT HR: 97 %
STRESS POST PEAK BP: NORMAL MMHG
STRESS POST PEAK HR: 135 BPM
STRESS TARGET HR: 139 BPM

## 2021-01-20 ENCOUNTER — OFFICE VISIT (OUTPATIENT)
Dept: CARDIOLOGY | Facility: CLINIC | Age: 56
End: 2021-01-20

## 2021-01-20 VITALS
DIASTOLIC BLOOD PRESSURE: 80 MMHG | TEMPERATURE: 98.1 F | WEIGHT: 146.8 LBS | BODY MASS INDEX: 28.82 KG/M2 | RESPIRATION RATE: 16 BRPM | HEART RATE: 76 BPM | HEIGHT: 60 IN | SYSTOLIC BLOOD PRESSURE: 161 MMHG

## 2021-01-20 DIAGNOSIS — R07.9 CHEST PAIN, UNSPECIFIED TYPE: Primary | ICD-10-CM

## 2021-01-20 DIAGNOSIS — I10 ESSENTIAL HYPERTENSION: ICD-10-CM

## 2021-01-20 PROCEDURE — 99213 OFFICE O/P EST LOW 20 MIN: CPT | Performed by: INTERNAL MEDICINE

## 2021-01-20 RX ORDER — KETOTIFEN FUMARATE 0.35 MG/ML
1 SOLUTION/ DROPS OPHTHALMIC 2 TIMES DAILY
COMMUNITY

## 2021-01-20 NOTE — PROGRESS NOTES
Anusha Licea MD  Diana Alfaro  1965  01/20/2021    Patient Active Problem List   Diagnosis   • Dyslipidemia   • Essential hypertension   • Hyperlipidemia   • Precordial pain   • Drug therapy changed   • Chest pain   • Nasal septal deviation       Dear Anusha Licea MD:    Subjective     Diana Alfaro is a 55 y.o. female with the problems as listed above, presents    Chief complaint: Follow-up of chest pains and to review the recent stress echo results.    History of Present Illness: Ms. Alfaro is a pleasant 55-year-old  female with some intermittent chest pains for which she underwent a recent stress echo study.  She is here to discuss the results.  She also has history of hypertension and brought blood pressure readings from home which I reviewed and she seems to have excellent blood pressure control at home with blood pressure readings mostly in the range of 120s up to 130 systolic and 60s to 80s diastolic with heart rate in the 50s to 60s although blood pressure today is high as she had to drive from [place] for about 45 minutes in the traffic..  She has occasional chest pains and some intermittent dyspnea.  Her recent stress echo study was normal with no echocardiograph evidence of myocardial ischemia with dobutamine stress.    Interpretation Summary    · Stress Procedure:  · Pharmacological stress test was performed using dobutamine without low-level exercise.  · Patient denied any chest discomfort during infusion  · There was no ST segment deviation noted during stress.  · There were no significant arrhythmias noted during stress  · No ECG evidence of myocardial ischemia  · Findings consistent with a normal ECG stress test.  · Echocardiogram Findings:  · Normal left ventricular cavity size and wall thickness noted. All left ventricular wall segments contract normally.  · Left ventricular ejection fraction appears to be 61 - 65%.  · The aortic valve is not well visualized. No  aortic valve regurgitation or stenosis is present. The aortic valve is grossly normal in structure.  · The mitral valve is structurally normal with no regurgitation or significant stenosis present.  · There is no evidence of pericardial effusion. .  · Stress Echo Findings:  · Segment augmentation had a normal response to stress  · Cavity size behaved normally in response to stress. Left ventricular function is normal. Septal wall motion is normal.  · Normal stress echo with no significant echocardiographic evidence for myocardial ischemia.          No Known Allergies:      Current Outpatient Medications:   •  atorvastatin (LIPITOR) 80 MG tablet, Take 1 tablet by mouth Daily. (Patient taking differently: Take 80 mg by mouth Daily. PATIENT TAKING 20MG), Disp: 30 tablet, Rfl: 2  •  CALCIUM-MAG-VIT C-VIT D PO, Take  by mouth., Disp: , Rfl:   •  fluticasone (FLONASE) 50 MCG/ACT nasal spray, 2 sprays into the nostril(s) as directed by provider Daily., Disp: , Rfl:   •  ibuprofen (ADVIL,MOTRIN) 800 MG tablet, Take 800 mg by mouth Every 6 (Six) Hours As Needed for Mild Pain ., Disp: , Rfl:   •  ketotifen (ZADITOR) 0.025 % ophthalmic solution, 1 drop 2 (Two) Times a Day., Disp: , Rfl:   •  loratadine (CLARITIN) 10 MG tablet, Take  by mouth Daily., Disp: , Rfl: 5  •  losartan (COZAAR) 50 MG tablet, Take 1 tablet by mouth 2 (Two) Times a Day., Disp: 60 tablet, Rfl: 5  •  Multiple Minerals (CALCIUM/MAGNESIUM/ZINC PO), Take  by mouth., Disp: , Rfl:   •  Plant Sterol Stanol-Pantethine (CHOLESTOFF COMPLETE PO), Take 3 tablets by mouth 2 (Two) Times a Day., Disp: , Rfl:   •  hydrALAZINE (APRESOLINE) 10 MG tablet, Take 1 tablet by mouth 3 (Three) Times a Day., Disp: 90 tablet, Rfl: 5      The following portions of the patient's history were reviewed and updated as appropriate: allergies, current medications, past family history, past medical history, past social history, past surgical history and problem list.    Social History  "    Tobacco Use   • Smoking status: Never Smoker   • Smokeless tobacco: Never Used   Substance Use Topics   • Alcohol use: No   • Drug use: No       Review of Systems   Cardiovascular: Negative for chest pain and palpitations.   Respiratory: Negative for shortness of breath.      Objective   Vitals:    01/20/21 1432   BP: 161/80   Pulse: 76   Resp: 16   Temp: 98.1 °F (36.7 °C)   Weight: 66.6 kg (146 lb 12.8 oz)   Height: 152.4 cm (60\")     Body mass index is 28.67 kg/m².    Vitals signs reviewed.   Constitutional:       Appearance: Well-developed.   Eyes:      Conjunctiva/sclera: Conjunctivae normal.   HENT:      Head: Normocephalic.   Neck:      Musculoskeletal: Normal range of motion and neck supple.      Thyroid: No thyromegaly.      Vascular: No JVD.      Trachea: No tracheal deviation.   Pulmonary:      Effort: No respiratory distress.      Breath sounds: Normal breath sounds. No wheezing. No rales.   Cardiovascular:      PMI at left midclavicular line. Normal rate. Regular rhythm. Normal S1. Normal S2.      Murmurs: There is no murmur.      No gallop. No click. No rub.   Pulses:     Intact distal pulses.   Edema:     Peripheral edema absent.   Abdominal:      General: Bowel sounds are normal.      Palpations: Abdomen is soft. There is no abdominal mass.      Tenderness: There is no abdominal tenderness.   Skin:     General: Skin is warm and dry.   Neurological:      Mental Status: Alert and oriented to person, place, and time.      Cranial Nerves: No cranial nerve deficit.       Lab Results   Component Value Date     07/17/2018    K 3.9 07/17/2018     07/17/2018    CO2 29.5 07/17/2018    BUN 20 07/17/2018    CREATININE 0.77 07/17/2018    GLUCOSE 87 07/17/2018    CALCIUM 9.3 07/17/2018    AST 27 03/05/2018    ALT 25 03/05/2018    ALKPHOS 64 03/05/2018     Lab Results   Component Value Date    CKTOTAL 61 03/05/2018     Lab Results   Component Value Date    WBC 6.33 07/17/2018    HGB 13.4 07/17/2018 "    HCT 40.7 07/17/2018     07/17/2018     Lab Results   Component Value Date    INR 0.89 (L) 03/04/2018    INR 0.89 05/25/2017     No results found for: MG     Lab Results   Component Value Date    BNP 26.0 05/25/2017     Assessment/Plan :   Diagnosis Plan   1. Chest pain, unspecified type     2. Essential hypertension, seems to be well controlled at home.         Recommendations:  1. I have reviewed the stress echo results with the patient.  2. Since she has strong family history of premature coronary disease, may continue with low-dose aspirin as tolerated for now.  3. Continue with atorvastatin.  Dr. Phillips is monitoring her lipid panel and LFTs.    Return in about 7 months (around 8/20/2021).    As always, Anusha Licea MD  I appreciate very much the opportunity to participate in the cardiovascular care of your patients. Please do not hesitate to call me with any questions with regards to Diana Alfaro's evaluation and management.       With Best Regards,        Cornell Perkins MD, [facility]    Please note that portions of this note were completed with a voice recognition program.

## 2021-02-02 ENCOUNTER — TRANSCRIBE ORDERS (OUTPATIENT)
Dept: ADMINISTRATIVE | Facility: HOSPITAL | Age: 56
End: 2021-02-02

## 2021-02-02 ENCOUNTER — LAB (OUTPATIENT)
Dept: LAB | Facility: HOSPITAL | Age: 56
End: 2021-02-02

## 2021-02-02 DIAGNOSIS — L40.0 PSORIASIS VULGARIS: ICD-10-CM

## 2021-02-02 DIAGNOSIS — L40.1 IMPETIGO HERPETIFORMIS: ICD-10-CM

## 2021-02-02 DIAGNOSIS — L40.0 PSORIASIS VULGARIS: Primary | ICD-10-CM

## 2021-02-02 LAB
BASOPHILS # BLD AUTO: 0.02 10*3/MM3 (ref 0–0.2)
BASOPHILS NFR BLD AUTO: 0.3 % (ref 0–1.5)
DEPRECATED RDW RBC AUTO: 38.3 FL (ref 37–54)
EOSINOPHIL # BLD AUTO: 0.09 10*3/MM3 (ref 0–0.4)
EOSINOPHIL NFR BLD AUTO: 1.4 % (ref 0.3–6.2)
ERYTHROCYTE [DISTWIDTH] IN BLOOD BY AUTOMATED COUNT: 12.1 % (ref 12.3–15.4)
HCT VFR BLD AUTO: 44.8 % (ref 34–46.6)
HGB BLD-MCNC: 14.4 G/DL (ref 12–15.9)
IMM GRANULOCYTES # BLD AUTO: 0.02 10*3/MM3 (ref 0–0.05)
IMM GRANULOCYTES NFR BLD AUTO: 0.3 % (ref 0–0.5)
LYMPHOCYTES # BLD AUTO: 1.77 10*3/MM3 (ref 0.7–3.1)
LYMPHOCYTES NFR BLD AUTO: 27.8 % (ref 19.6–45.3)
MCH RBC QN AUTO: 27.7 PG (ref 26.6–33)
MCHC RBC AUTO-ENTMCNC: 32.1 G/DL (ref 31.5–35.7)
MCV RBC AUTO: 86.3 FL (ref 79–97)
MONOCYTES # BLD AUTO: 0.41 10*3/MM3 (ref 0.1–0.9)
MONOCYTES NFR BLD AUTO: 6.4 % (ref 5–12)
NEUTROPHILS NFR BLD AUTO: 4.06 10*3/MM3 (ref 1.7–7)
NEUTROPHILS NFR BLD AUTO: 63.8 % (ref 42.7–76)
NRBC BLD AUTO-RTO: 0 /100 WBC (ref 0–0.2)
PLATELET # BLD AUTO: 243 10*3/MM3 (ref 140–450)
PMV BLD AUTO: 10.5 FL (ref 6–12)
RBC # BLD AUTO: 5.19 10*6/MM3 (ref 3.77–5.28)
WBC # BLD AUTO: 6.37 10*3/MM3 (ref 3.4–10.8)

## 2021-02-02 PROCEDURE — 80074 ACUTE HEPATITIS PANEL: CPT

## 2021-02-02 PROCEDURE — 80053 COMPREHEN METABOLIC PANEL: CPT

## 2021-02-02 PROCEDURE — 82542 COL CHROMOTOGRAPHY QUAL/QUAN: CPT

## 2021-02-02 PROCEDURE — 36415 COLL VENOUS BLD VENIPUNCTURE: CPT

## 2021-02-02 PROCEDURE — 85025 COMPLETE CBC W/AUTO DIFF WBC: CPT

## 2021-02-02 PROCEDURE — 82248 BILIRUBIN DIRECT: CPT

## 2021-02-03 LAB
ALBUMIN SERPL-MCNC: 4.2 G/DL (ref 3.5–5.2)
ALBUMIN/GLOB SERPL: 1.3 G/DL
ALP SERPL-CCNC: 91 U/L (ref 39–117)
ALT SERPL W P-5'-P-CCNC: 18 U/L (ref 1–33)
ANION GAP SERPL CALCULATED.3IONS-SCNC: 10.6 MMOL/L (ref 5–15)
AST SERPL-CCNC: 22 U/L (ref 1–32)
BILIRUB CONJ SERPL-MCNC: <0.2 MG/DL (ref 0–0.3)
BILIRUB SERPL-MCNC: 0.2 MG/DL (ref 0–1.2)
BUN SERPL-MCNC: 15 MG/DL (ref 6–20)
BUN/CREAT SERPL: 19.2 (ref 7–25)
CALCIUM SPEC-SCNC: 9.6 MG/DL (ref 8.6–10.5)
CHLORIDE SERPL-SCNC: 102 MMOL/L (ref 98–107)
CO2 SERPL-SCNC: 26.4 MMOL/L (ref 22–29)
CREAT SERPL-MCNC: 0.78 MG/DL (ref 0.57–1)
GFR SERPL CREATININE-BSD FRML MDRD: 76 ML/MIN/1.73
GLOBULIN UR ELPH-MCNC: 3.3 GM/DL
GLUCOSE SERPL-MCNC: 96 MG/DL (ref 65–99)
HAV IGM SERPL QL IA: NORMAL
HBV CORE IGM SERPL QL IA: NORMAL
HBV SURFACE AG SERPL QL IA: NORMAL
HCV AB SER DONR QL: NORMAL
POTASSIUM SERPL-SCNC: 4.2 MMOL/L (ref 3.5–5.2)
PROT SERPL-MCNC: 7.5 G/DL (ref 6–8.5)
SODIUM SERPL-SCNC: 139 MMOL/L (ref 136–145)

## 2021-02-05 LAB
REF LAB TEST METHOD: NORMAL
TPMT GENE PROD MET ACT IMP BLD/T-IMP: NORMAL
TPMT RBC-CCNC: 16.4 UNITS/ML RBC

## 2021-04-01 ENCOUNTER — HOSPITAL ENCOUNTER (OUTPATIENT)
Dept: HOSPITAL 79 - EMI | Age: 56
End: 2021-04-01
Attending: ORTHOPAEDIC SURGERY
Payer: COMMERCIAL

## 2021-04-01 DIAGNOSIS — M50.01: Primary | ICD-10-CM

## 2021-04-01 DIAGNOSIS — M48.02: ICD-10-CM

## 2021-04-01 DIAGNOSIS — R93.7: ICD-10-CM

## 2021-06-10 ENCOUNTER — ANESTHESIA EVENT (OUTPATIENT)
Dept: PERIOP | Facility: HOSPITAL | Age: 56
End: 2021-06-10

## 2021-06-10 ENCOUNTER — OFFICE VISIT (OUTPATIENT)
Dept: NEUROSURGERY | Facility: CLINIC | Age: 56
End: 2021-06-10

## 2021-06-10 ENCOUNTER — HOSPITAL ENCOUNTER (INPATIENT)
Facility: HOSPITAL | Age: 56
LOS: 3 days | Discharge: HOME HEALTH | End: 2021-06-13
Attending: EMERGENCY MEDICINE | Admitting: FAMILY MEDICINE

## 2021-06-10 VITALS
TEMPERATURE: 97.1 F | WEIGHT: 145 LBS | SYSTOLIC BLOOD PRESSURE: 148 MMHG | DIASTOLIC BLOOD PRESSURE: 92 MMHG | HEIGHT: 60 IN | BODY MASS INDEX: 28.47 KG/M2

## 2021-06-10 DIAGNOSIS — G95.9 CERVICAL MYELOPATHY (HCC): ICD-10-CM

## 2021-06-10 DIAGNOSIS — M47.12 CERVICAL SPONDYLITIC CORD COMPRESSION: Primary | ICD-10-CM

## 2021-06-10 DIAGNOSIS — N31.9 NEUROGENIC BLADDER: ICD-10-CM

## 2021-06-10 DIAGNOSIS — Q66.01 CONGENITAL TALIPES EQUINOVARUS DEFORMITY OF RIGHT FOOT: ICD-10-CM

## 2021-06-10 DIAGNOSIS — G95.9 CERVICAL MYELOPATHY WITH CERVICAL RADICULOPATHY (HCC): ICD-10-CM

## 2021-06-10 DIAGNOSIS — M47.12 CERVICAL SPONDYLOSIS WITH MYELOPATHY: Primary | ICD-10-CM

## 2021-06-10 DIAGNOSIS — M54.12 CERVICAL MYELOPATHY WITH CERVICAL RADICULOPATHY (HCC): ICD-10-CM

## 2021-06-10 DIAGNOSIS — E78.2 MIXED HYPERLIPIDEMIA: ICD-10-CM

## 2021-06-10 DIAGNOSIS — R26.9 ABNORMALITY OF GAIT: ICD-10-CM

## 2021-06-10 LAB
ANION GAP SERPL CALCULATED.3IONS-SCNC: 9 MMOL/L (ref 5–15)
BASOPHILS # BLD AUTO: 0.03 10*3/MM3 (ref 0–0.2)
BASOPHILS NFR BLD AUTO: 0.4 % (ref 0–1.5)
BUN SERPL-MCNC: 13 MG/DL (ref 6–20)
BUN/CREAT SERPL: 18.3 (ref 7–25)
CALCIUM SPEC-SCNC: 9.8 MG/DL (ref 8.6–10.5)
CHLORIDE SERPL-SCNC: 105 MMOL/L (ref 98–107)
CO2 SERPL-SCNC: 28 MMOL/L (ref 22–29)
CREAT SERPL-MCNC: 0.71 MG/DL (ref 0.57–1)
DEPRECATED RDW RBC AUTO: 39.4 FL (ref 37–54)
EOSINOPHIL # BLD AUTO: 0.09 10*3/MM3 (ref 0–0.4)
EOSINOPHIL NFR BLD AUTO: 1.2 % (ref 0.3–6.2)
ERYTHROCYTE [DISTWIDTH] IN BLOOD BY AUTOMATED COUNT: 12.5 % (ref 12.3–15.4)
ERYTHROCYTE [SEDIMENTATION RATE] IN BLOOD: 16 MM/HR (ref 0–30)
FLUAV RNA RESP QL NAA+PROBE: NOT DETECTED
FLUBV RNA RESP QL NAA+PROBE: NOT DETECTED
GFR SERPL CREATININE-BSD FRML MDRD: 85 ML/MIN/1.73
GLUCOSE SERPL-MCNC: 94 MG/DL (ref 65–99)
HCT VFR BLD AUTO: 41.4 % (ref 34–46.6)
HGB BLD-MCNC: 13.5 G/DL (ref 12–15.9)
HOLD SPECIMEN: NORMAL
IMM GRANULOCYTES # BLD AUTO: 0.01 10*3/MM3 (ref 0–0.05)
IMM GRANULOCYTES NFR BLD AUTO: 0.1 % (ref 0–0.5)
LYMPHOCYTES # BLD AUTO: 2.03 10*3/MM3 (ref 0.7–3.1)
LYMPHOCYTES NFR BLD AUTO: 27.6 % (ref 19.6–45.3)
MCH RBC QN AUTO: 28.2 PG (ref 26.6–33)
MCHC RBC AUTO-ENTMCNC: 32.6 G/DL (ref 31.5–35.7)
MCV RBC AUTO: 86.4 FL (ref 79–97)
MONOCYTES # BLD AUTO: 0.56 10*3/MM3 (ref 0.1–0.9)
MONOCYTES NFR BLD AUTO: 7.6 % (ref 5–12)
NEUTROPHILS NFR BLD AUTO: 4.64 10*3/MM3 (ref 1.7–7)
NEUTROPHILS NFR BLD AUTO: 63.1 % (ref 42.7–76)
NRBC BLD AUTO-RTO: 0 /100 WBC (ref 0–0.2)
PLATELET # BLD AUTO: 259 10*3/MM3 (ref 140–450)
PMV BLD AUTO: 11.3 FL (ref 6–12)
POTASSIUM SERPL-SCNC: 3.8 MMOL/L (ref 3.5–5.2)
RBC # BLD AUTO: 4.79 10*6/MM3 (ref 3.77–5.28)
SARS-COV-2 RNA RESP QL NAA+PROBE: NOT DETECTED
SODIUM SERPL-SCNC: 142 MMOL/L (ref 136–145)
WBC # BLD AUTO: 7.36 10*3/MM3 (ref 3.4–10.8)
WHOLE BLOOD HOLD SPECIMEN: NORMAL

## 2021-06-10 PROCEDURE — 93005 ELECTROCARDIOGRAM TRACING: CPT | Performed by: INTERNAL MEDICINE

## 2021-06-10 PROCEDURE — 85025 COMPLETE CBC W/AUTO DIFF WBC: CPT | Performed by: EMERGENCY MEDICINE

## 2021-06-10 PROCEDURE — 99221 1ST HOSP IP/OBS SF/LOW 40: CPT | Performed by: INTERNAL MEDICINE

## 2021-06-10 PROCEDURE — 99284 EMERGENCY DEPT VISIT MOD MDM: CPT

## 2021-06-10 PROCEDURE — 93010 ELECTROCARDIOGRAM REPORT: CPT | Performed by: INTERNAL MEDICINE

## 2021-06-10 PROCEDURE — 80048 BASIC METABOLIC PNL TOTAL CA: CPT | Performed by: EMERGENCY MEDICINE

## 2021-06-10 PROCEDURE — 85652 RBC SED RATE AUTOMATED: CPT | Performed by: INTERNAL MEDICINE

## 2021-06-10 PROCEDURE — 87636 SARSCOV2 & INF A&B AMP PRB: CPT | Performed by: EMERGENCY MEDICINE

## 2021-06-10 PROCEDURE — 99205 OFFICE O/P NEW HI 60 MIN: CPT | Performed by: NEUROLOGICAL SURGERY

## 2021-06-10 RX ORDER — ERGOCALCIFEROL 1.25 MG/1
50000 CAPSULE ORAL WEEKLY
COMMUNITY
Start: 2021-05-17

## 2021-06-10 RX ORDER — SODIUM CHLORIDE 0.9 % (FLUSH) 0.9 %
10 SYRINGE (ML) INJECTION AS NEEDED
Status: DISCONTINUED | OUTPATIENT
Start: 2021-06-10 | End: 2021-06-13 | Stop reason: HOSPADM

## 2021-06-10 RX ORDER — KETOTIFEN FUMARATE 0.35 MG/ML
1 SOLUTION/ DROPS OPHTHALMIC 2 TIMES DAILY
Status: DISCONTINUED | OUTPATIENT
Start: 2021-06-10 | End: 2021-06-13 | Stop reason: HOSPADM

## 2021-06-10 RX ORDER — FAMOTIDINE 10 MG/ML
20 INJECTION, SOLUTION INTRAVENOUS ONCE
Status: CANCELLED | OUTPATIENT
Start: 2021-06-10 | End: 2021-06-10

## 2021-06-10 RX ORDER — ATORVASTATIN CALCIUM 40 MG/1
40 TABLET, FILM COATED ORAL NIGHTLY
Status: DISCONTINUED | OUTPATIENT
Start: 2021-06-10 | End: 2021-06-13 | Stop reason: HOSPADM

## 2021-06-10 RX ORDER — LOSARTAN POTASSIUM 50 MG/1
50 TABLET ORAL 2 TIMES DAILY
Status: DISCONTINUED | OUTPATIENT
Start: 2021-06-10 | End: 2021-06-13 | Stop reason: HOSPADM

## 2021-06-10 RX ORDER — CA/D3/MAG OX/ZINC/COP/MANG/BOR 600 MG-800
TABLET,CHEWABLE ORAL
COMMUNITY
Start: 2020-05-04

## 2021-06-10 RX ORDER — FLUTICASONE PROPIONATE 50 MCG
2 SPRAY, SUSPENSION (ML) NASAL DAILY
Status: DISCONTINUED | OUTPATIENT
Start: 2021-06-11 | End: 2021-06-13 | Stop reason: HOSPADM

## 2021-06-10 RX ORDER — FAMOTIDINE 20 MG/1
20 TABLET, FILM COATED ORAL
Status: CANCELLED | OUTPATIENT
Start: 2021-06-10

## 2021-06-10 RX ADMIN — LOSARTAN POTASSIUM 50 MG: 50 TABLET, FILM COATED ORAL at 21:25

## 2021-06-10 RX ADMIN — ATORVASTATIN CALCIUM 40 MG: 40 TABLET, FILM COATED ORAL at 21:25

## 2021-06-10 RX ADMIN — KETOTIFEN FUMARATE 1 DROP: 0.35 SOLUTION/ DROPS OPHTHALMIC at 21:27

## 2021-06-10 RX ADMIN — FLUTICASONE PROPIONATE 2 SPRAY: 50 SPRAY, METERED NASAL at 21:26

## 2021-06-10 NOTE — H&P
[facility]  HISTORY AND PHYSICAL    Patient Name: Diana Alfaro  : 1965  MRN: [id number]  Primary Care Physician: Anusha Licea MD    Subjective   Subjective     Chief Complaint:    HPI:  Diana Alfaro is a 55 y.o. female with history of hypertension, hyperlipidemia who has had progressive difficulty walking as well as some progressive weakness and tingling in her fingers.  Patient was seen by neurosurgery clinic today and found to have critical cervical stenosis and was recommended to be seen in the emergency room for admission.  Patient reports her greatest difficulty is walking any distance she says her legs feel heavy and just do not work like the should.  She also has occasional headaches.  She denies any difficulty breathing.    Review of Systems   Patient denies weight loss, changes in vision, fever, chills, sore throat, shortness of breath, cough, nausea or vomiting, diarrhea, abdominal pain or distension, change in urine output or habits, joint pain, rash, itching or bleeding.  Some ankle edema  Otherwise complete 10-system ROS is negative except as mentioned in the HPI.    Personal History     Past Medical History:   Diagnosis Date   • Arthritis    • Chest pain    • Club foot    • Deviated septum    • Elevated cholesterol    • Environmental allergies    • Gout    • Hyperlipidemia    • Hypertension    • Sinusitis        Past Surgical History:   Procedure Laterality Date   • CHOLECYSTECTOMY     • FOOT SURGERY      s/p clubfoot, R   • SEPTOPLASTY, RESECTION INFERIOR TURBINATES Bilateral 2018    Procedure: SEPTOPLASTY ONLY;  Surgeon: Lorenzo Ordoñez MD;  Location: [facility];  Service: ENT       Family History: family history includes Heart attack in her father, maternal aunt, maternal grandfather, maternal grandmother, maternal uncle, mother, paternal aunt, paternal grandfather, paternal grandmother, and paternal uncle.     Social History:   reports that she has never smoked. She has never used smokeless tobacco. She reports that she does not drink alcohol and does not use drugs.  Lives alone, has a dog and a cat, worked for school system, last worked 1998    Medications:  Calcium-Mag-Vit C-Vit D, Caltrate 600+D Plus Minerals, Multiple Minerals, Plant Sterol Stanol-Pantethine, atorvastatin, fluticasone, ibuprofen, ketotifen, loratadine, losartan, and vitamin D      Allergies   Allergen Reactions   • Amlodipine Unknown - Low Severity   • Beta Adrenergic Blockers Other (See Comments)     bradycardia       Objective   Objective     Vital Signs:   Temp:  [97.1 °F (36.2 °C)-98.4 °F (36.9 °C)] 98.4 °F (36.9 °C)  Heart Rate:  [73-81] 81  Resp:  [16-18] 18  BP: (139-165)/(67-92) 139/67    Patient is alert and talkative in no distress at rest she is lying in bed very still, she has limited lateral range of motion of her neck worse to the right  Neck is without mass or JVD  Heart is Reg wo murmur  Lungs are clear wo wheeze or crackle  Abd is soft without HSM or mass, not tender or distended  MAEW, she has generally good strength, but was not ambulated due to the concern witnessed in neurosurgery clinic.  Skin is without rash  Neurologic exam is nonfocal   Mood is appropriate      Result Review:  I have personally reviewed the results from the time of admission and agree with these findings:  [x]  Laboratory  [x]  Radiology  []  EKG/Telemetry   []  Pathology  []  Old records  []  Other:  Most notable findings include: Markedly abnormal MRI per Dr. Sheriff she has terrible spastic myelopathy and severe cord compression with edema seen on her scans    LAB RESULTS:      Lab 06/10/21  1704   WBC 7.36   HEMOGLOBIN 13.5   HEMATOCRIT 41.4   PLATELETS 259   NEUTROS ABS 4.64   IMMATURE GRANS (ABS) 0.01   LYMPHS ABS 2.03   MONOS ABS 0.56   EOS ABS 0.09   MCV 86.4   SED RATE 16         Lab 06/10/21  1704   SODIUM 142   POTASSIUM 3.8   CHLORIDE 105   CO2 28.0   ANION GAP 9.0    BUN 13   CREATININE 0.71   GLUCOSE 94   CALCIUM 9.8                         Brief Urine Lab Results     None        Microbiology Results (last 10 days)     Procedure Component Value - Date/Time    COVID-19 and FLU A/B PCR - Swab, Nasopharynx [[id number]]  (Normal) Collected: 06/10/21 1801    Lab Status: Final result Specimen: Swab from Nasopharynx Updated: 06/10/21 1843     COVID19 Not Detected     Influenza A PCR Not Detected     Influenza B PCR Not Detected    Narrative:      Fact sheet for providers: https://www.fda.gov/media/727668/download    Fact sheet for patients: https://www.fda.gov/media/011094/download    Test performed by PCR.          MRI outside films    Result Date: 6/10/2021  This procedure was auto-finalized with no dictation required.    MRI outside films    Result Date: 6/10/2021  This procedure was auto-finalized with no dictation required.      Results for orders placed during the hospital encounter of 12/22/20    Adult Stress Echo W/ Cont or Stress Agent if Necessary Per Protocol    Interpretation Summary  · Stress Procedure:  · Pharmacological stress test was performed using dobutamine without low-level exercise.  · Patient denied any chest discomfort during infusion  · There was no ST segment deviation noted during stress.  · There were no significant arrhythmias noted during stress  · No ECG evidence of myocardial ischemia  · Findings consistent with a normal ECG stress test.  · Echocardiogram Findings:  · Normal left ventricular cavity size and wall thickness noted. All left ventricular wall segments contract normally.  · Left ventricular ejection fraction appears to be 61 - 65%.  · The aortic valve is not well visualized. No aortic valve regurgitation or stenosis is present. The aortic valve is grossly normal in structure.  · The mitral valve is structurally normal with no regurgitation or significant stenosis present.  · There is no evidence of pericardial effusion. .  · Stress Echo  Findings:  · Segment augmentation had a normal response to stress  · Cavity size behaved normally in response to stress. Left ventricular function is normal. Septal wall motion is normal.  · Normal stress echo with no significant echocardiographic evidence for myocardial ischemia.      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  -Discussed with Nahid Estrella with Dr. Sheriff  -Agreed to give 1 dose of IV Decadron  -Plan for surgical posterior cervical laminectomy at 3 levels      DVT prophylaxis:SCDs  CODE STATUS:FULL CODE    Admission Status: INPATIENT status due to severe myelopathy and critical cervical spine stenosis.  I feel patient’s risk for adverse outcomes and need for care warrant INPATIENT evaluation and I predict the patient’s care encounter to likely last beyond 2 midnights.    Electronically signed by Bisi Guerin MD 06/10/21 20:46 EDT

## 2021-06-10 NOTE — CONSULTS
Consults    Referring Provider: Gabriel ALEJO    Patient Care Team:  Anusha Licea MD as PCP - General (Internal Medicine)    Chief Complaint: Progressive cervical myelopathy    Subjective .     History of present illness:       Patient is nice 55-year-old female who came to the ER after being seen by Dr. Nacho Sheriff secondary to progressive cervical myelopathy.  She is known to have a clubfoot congenitally with multiple issues surrounding arthritis/gout.      For the last 3 months she has had progressive gait instability she does have history of recent falls.  Patient has been having worsening spasticity and urinary retention since those falls.    Patient was sent to the ER secondary to the progressive nature of her myelopathy.    Patient was seen in the office with a MRI that shows critical stenosis with spinal cord edema most prominent at C4-5.  Patient is a very bad fall risk and is very unsteady on her feet secondary to the myelopathy.  If she were to fall she could permanently damage herself and some of the symptoms that she is already experienced may be permanent that is at this point.  This was reviewed with patient and mother in the ER.    Plan is to get her admitted to the hospitalist and get her worked up for general medical clearance for a C3-5 posterior cervical laminectomy.  She has severe gait instability.  Generally, she does have social issues as far as her safety goes and may need rehabilitation after the procedure.    Review of Systems   Constitutional: Negative for activity change, appetite change, chills, fatigue and fever.   HENT: Negative for congestion, dental problem, ear pain, hearing loss, sinus pressure and tinnitus.    Eyes: Negative for pain and redness.   Respiratory: Negative for apnea, cough, shortness of breath and wheezing.    Cardiovascular: Negative for chest pain, palpitations and leg swelling.   Gastrointestinal: Negative for abdominal distention, abdominal pain,  blood in stool, constipation, diarrhea, nausea and vomiting.   Endocrine: Negative for cold intolerance, heat intolerance and polyuria.   Genitourinary: Negative for enuresis, frequency and urgency.   Skin: Negative for color change and rash.   Neurological: Negative for dizziness, tremors, seizures, syncope, speech difficulty, weakness, light-headedness, numbness and headaches.   Psychiatric/Behavioral: Negative for behavioral problems and confusion. The patient is not nervous/anxious.        History  Past Medical History:   Diagnosis Date   • Arthritis    • Chest pain    • Club foot    • Deviated septum    • Elevated cholesterol    • Environmental allergies    • Gout    • Hyperlipidemia    • Hypertension    • Sinusitis    ,   Past Surgical History:   Procedure Laterality Date   • CHOLECYSTECTOMY     • FOOT SURGERY      s/p clubfoot, R   • SEPTOPLASTY, RESECTION INFERIOR TURBINATES Bilateral 7/18/2018    Procedure: SEPTOPLASTY ONLY;  Surgeon: Lorenzo Ordoñez MD;  Location: [facility];  Service: ENT   ,   Family History   Problem Relation Age of Onset   • Heart attack Mother    • Heart attack Father    • Heart attack Maternal Aunt    • Heart attack Maternal Uncle    • Heart attack Paternal Aunt    • Heart attack Paternal Uncle    • Heart attack Maternal Grandmother    • Heart attack Maternal Grandfather    • Heart attack Paternal Grandmother    • Heart attack Paternal Grandfather    ,   Social History     Socioeconomic History   • Marital status: Single     Spouse name: Not on file   • Number of children: Not on file   • Years of education: Not on file   • Highest education level: Not on file   Tobacco Use   • Smoking status: Never Smoker   • Smokeless tobacco: Never Used   Substance and Sexual Activity   • Alcohol use: No   • Drug use: No     E-cigarette/Vaping     E-cigarette/Vaping Substances     E-cigarette/Vaping Devices       , (Not in a hospital admission)  , Scheduled Meds:   , Continuous Infusions:   , PRN  Meds:  •  Insert peripheral IV **AND** sodium chloride and Allergies:  Amlodipine and Beta adrenergic blockers   SMOKING STATUS: Non-smoker  Objective     Vital Signs   Temp:  [97.1 °F (36.2 °C)-98.4 °F (36.9 °C)] 98.4 °F (36.9 °C)  Heart Rate:  [73] 73  Resp:  [16] 16  BP: (148-165)/(89-92) 165/89  Body mass index is 28.32 kg/m².    Physical Exam:     Body mass index is 28.32 kg/m².    GENERAL:           The patient is in no acute distress, and is able to answer all questions appropriately.    Neck:          Supple without lymphadenopathy    Cardiovascular:       Peripheral pulses 2+ at dorsalis pedis and posterior tibialis    Lungs:         Breathing unlabored    Musculoskeletal:            strength is 5 out of 5 bilaterally.        Shoulder abduction is 5 out of 5.         Dorsiflexion is 4-5 on right 5 out of 5 on left       Plantarflexion is 5/5 bilaterally       Hip Flexion 5/5 bilaterally.         The patient´s gait is wide-based and spastic    Romberg not tested secondary to instability    Neurologic:          The patient is alert and oriented by 3.          Pupils are equal and reactive to light.         Visual fields are full.         Extraocular movements are intact without nystagmus.         There is no evidence of central motor drift. No facial droop.  No difficulty with rapid alternating movements.         Sensation is equal bilaterally with no deficit.           Reflexes:  3/4+ through out  Jamey's positive bilaterally  Clonus bilaterally    CRANIAL NERVES:         Deferred secondary to Covid mask    Results Review:   I reviewed the patient's new imaging results and agree with the interpretation.  Discussed with Dr. Sheriff.  Patient has severe/critical canal stenosis with cord impingement and myelomalacia at C4-5 she has severe stenosis at C3-4 and moderate to severe at C 5 6.  Patient has congenitally narrow canal as well with chronic myelomalacia mixed with acute STIR signal  change    Assessment/Plan     Spinal cord injury  Progressive myelopathy  Severely inhibited gait    Patient sent to the ER secondary to spinal cord injury and progressive gait dysfunction and being a terrible fall risk.  Patient is very tight in her cervical canal with RAD myelomalacia in her cord and she understands that some of these symptoms she is already having may not improve after surgery.  Patient may need rehabilitation following the procedure.    We have requested that the hospitalist admit the patient and do a general medical clearance work-up.     I discussed the patients findings and my recommendations with patient, family and consulting provider    Nahid Estrella PA-C  06/10/21  16:33 EDT    Time: 60 minutes

## 2021-06-10 NOTE — ED PROVIDER NOTES
Subjective   Pt presents with neck pain and extremity weakness.  Several month history of these sx - both arms and left leg primarily.  She has been seeing Dr Sheriff, had MRIs as outpatient.  She has been trying conservative mgmt but her weakness has been progressive over the last week.  She saw Jaziel in the office today and was sent to ED for admission for surgery tomorrow.    She denies fever, cough, myalgias, loss of taste/smell.  She has not gotten the covid vaccine and does not plan to.      History provided by:  Patient      Review of Systems   Constitutional: Negative for fever.   Respiratory: Negative for cough.    Musculoskeletal: Positive for neck pain. Negative for myalgias.   Neurological: Positive for weakness.   All other systems reviewed and are negative.      Past Medical History:   Diagnosis Date   • Arthritis    • Chest pain    • Club foot    • Deviated septum    • Elevated cholesterol    • Environmental allergies    • Gout    • Hyperlipidemia    • Hypertension    • Sinusitis        Allergies   Allergen Reactions   • Amlodipine Unknown - Low Severity   • Beta Adrenergic Blockers Other (See Comments)     bradycardia       Past Surgical History:   Procedure Laterality Date   • CHOLECYSTECTOMY     • FOOT SURGERY      s/p clubfoot, R   • SEPTOPLASTY, RESECTION INFERIOR TURBINATES Bilateral 7/18/2018    Procedure: SEPTOPLASTY ONLY;  Surgeon: Lorenzo Ordoñez MD;  Location: [facility];  Service: ENT       Family History   Problem Relation Age of Onset   • Heart attack Mother    • Heart attack Father    • Heart attack Maternal Aunt    • Heart attack Maternal Uncle    • Heart attack Paternal Aunt    • Heart attack Paternal Uncle    • Heart attack Maternal Grandmother    • Heart attack Maternal Grandfather    • Heart attack Paternal Grandmother    • Heart attack Paternal Grandfather        Social History     Socioeconomic History   • Marital status: Single     Spouse name: Not on file   • Number of  children: Not on file   • Years of education: Not on file   • Highest education level: Not on file   Tobacco Use   • Smoking status: Never Smoker   • Smokeless tobacco: Never Used   Substance and Sexual Activity   • Alcohol use: No   • Drug use: No           Objective   Physical Exam  Vitals and nursing note reviewed.   Constitutional:       General: She is not in acute distress.     Appearance: Normal appearance. She is not ill-appearing.   HENT:      Head: Normocephalic and atraumatic.      Mouth/Throat:      Mouth: Mucous membranes are moist.   Eyes:      General: No scleral icterus.        Right eye: No discharge.         Left eye: No discharge.      Conjunctiva/sclera: Conjunctivae normal.   Cardiovascular:      Rate and Rhythm: Normal rate and regular rhythm.      Heart sounds: No murmur heard.     Pulmonary:      Effort: Pulmonary effort is normal. No respiratory distress.      Breath sounds: Normal breath sounds. No wheezing.   Abdominal:      General: Bowel sounds are normal. There is no distension.      Palpations: Abdomen is soft.      Tenderness: There is no abdominal tenderness. There is no guarding or rebound.   Musculoskeletal:         General: No swelling. Normal range of motion.      Cervical back: Normal range of motion and neck supple.   Skin:     General: Skin is warm and dry.      Findings: No rash.   Neurological:      Mental Status: She is alert and oriented to person, place, and time.      Motor: Weakness present.   Psychiatric:         Mood and Affect: Mood normal.         Behavior: Behavior normal.         Thought Content: Thought content normal.         Procedures           ED Course         Reviewed Dr Sheriff's note from today, confirms history stated by patient above.  Plan for operative repair.  Pt admitted.                                  MDM  Number of Diagnoses or Management Options     Amount and/or Complexity of Data Reviewed  Clinical lab tests: ordered and reviewed  Decide to  obtain previous medical records or to obtain history from someone other than the patient: yes  Review and summarize past medical records: yes  Discuss the patient with other providers: yes        Final diagnoses:   Cervical myelopathy (CMS/HCC)       ED Disposition  ED Disposition     ED Disposition Condition Comment    Decision to Admit  Level of Care: Telemetry [5]   Diagnosis: Cervical myelopathy (CMS/HCC) [535570]   Admitting Physician: HARSHA DAVENPORT [[id number]]   Attending Physician: HARSHA DAVENPORT [[id number]]   Certification: I Certify That Inpatient Hospital Services Are Medically Necessary For Greater Than 2 Midnights            No follow-up provider specified.       Medication List      No changes were made to your prescriptions during this visit.          Cliff Rios MD  06/10/21 2941

## 2021-06-10 NOTE — PROGRESS NOTES
NAME: RADHA PAZ   DOS: 6/10/2021  : 1965  PCP: Anusha Licea MD    Chief Complaint: Gait instability    History of Present Illness:  55 y.o. female   Is a 55-year-old female who has a history of a clubfoot congenitally she has had multiple issues including arthritic issues and gout with progressive gait instability she reports relief for almost a year it has been worse in the last 3 months she is accompanied by a friend who helps her out drives for her etc.  She is reported a fall history she has an ebbing and flowing of symptoms in terms of spasticity urinary spasticity and gait instability she is evaluated today for the presence of cervical myelopathy    PMHX  Allergies:  Allergies   Allergen Reactions   • Amlodipine Unknown - Low Severity   • Beta Adrenergic Blockers Other (See Comments)     bradycardia     Medications    Current Outpatient Medications:   •  atorvastatin (LIPITOR) 80 MG tablet, Take 1 tablet by mouth Daily. (Patient taking differently: Take 80 mg by mouth Daily. PATIENT TAKING 20MG), Disp: 30 tablet, Rfl: 2  •  Calcium Carbonate-Vit D-Min (Caltrate 600+D Plus Minerals) 600-800 MG-UNIT chewable tablet, Chew., Disp: , Rfl:   •  CALCIUM-MAG-VIT C-VIT D PO, Take  by mouth., Disp: , Rfl:   •  fluticasone (FLONASE) 50 MCG/ACT nasal spray, 2 sprays into the nostril(s) as directed by provider Daily., Disp: , Rfl:   •  ibuprofen (ADVIL,MOTRIN) 800 MG tablet, Take 800 mg by mouth Every 6 (Six) Hours As Needed for Mild Pain ., Disp: , Rfl:   •  ketotifen (ZADITOR) 0.025 % ophthalmic solution, 1 drop 2 (Two) Times a Day., Disp: , Rfl:   •  loratadine (CLARITIN) 10 MG tablet, Take  by mouth Daily., Disp: , Rfl: 5  •  losartan (COZAAR) 50 MG tablet, Take 1 tablet by mouth 2 (Two) Times a Day., Disp: 60 tablet, Rfl: 5  •  Multiple Minerals (CALCIUM/MAGNESIUM/ZINC PO), Take  by mouth., Disp: , Rfl:   •  Plant Sterol Stanol-Pantethine (CHOLESTOFF COMPLETE PO), Take 3 tablets by mouth 2 (Two)  Times a Day., Disp: , Rfl:   •  vitamin D (ERGOCALCIFEROL) 1.25 MG (40762 UT) capsule capsule, Take 50,000 Units by mouth 1 (One) Time Per Week., Disp: , Rfl:   Past Medical History:  Past Medical History:   Diagnosis Date   • Arthritis    • Chest pain    • Club foot    • Deviated septum    • Elevated cholesterol    • Environmental allergies    • Gout    • Hyperlipidemia    • Hypertension    • Sinusitis      Past Surgical History:  Past Surgical History:   Procedure Laterality Date   • CHOLECYSTECTOMY     • FOOT SURGERY      s/p clubfoot, R   • SEPTOPLASTY, RESECTION INFERIOR TURBINATES Bilateral 7/18/2018    Procedure: SEPTOPLASTY ONLY;  Surgeon: Lorenzo Ordoñez MD;  Location: [facility];  Service: ENT     Social Hx:  Social History     Tobacco Use   • Smoking status: Never Smoker   • Smokeless tobacco: Never Used   Substance Use Topics   • Alcohol use: No   • Drug use: No     Family Hx:  Family History   Problem Relation Age of Onset   • Heart attack Mother    • Heart attack Father    • Heart attack Maternal Aunt    • Heart attack Maternal Uncle    • Heart attack Paternal Aunt    • Heart attack Paternal Uncle    • Heart attack Maternal Grandmother    • Heart attack Maternal Grandfather    • Heart attack Paternal Grandmother    • Heart attack Paternal Grandfather      Review of Systems:        Review of Systems   Constitutional: Positive for activity change and fatigue. Negative for appetite change, chills, diaphoresis, fever and unexpected weight change.   HENT: Positive for mouth sores. Negative for congestion, dental problem, drooling, ear discharge, ear pain, facial swelling, hearing loss, nosebleeds, postnasal drip, rhinorrhea, sinus pressure, sneezing, sore throat, tinnitus, trouble swallowing and voice change.    Eyes: Negative for photophobia, pain, discharge, redness, itching and visual disturbance.   Respiratory: Negative for apnea, cough, choking, chest tightness, shortness of breath, wheezing and  stridor.    Cardiovascular: Positive for leg swelling. Negative for chest pain and palpitations.   Gastrointestinal: Negative for abdominal distention, abdominal pain, anal bleeding, blood in stool, constipation, diarrhea, nausea, rectal pain and vomiting.   Endocrine: Negative for cold intolerance, heat intolerance, polydipsia, polyphagia and polyuria.   Genitourinary: Positive for frequency and urgency. Negative for decreased urine volume, difficulty urinating, dysuria, enuresis, flank pain, genital sores and hematuria.   Musculoskeletal: Positive for arthralgias, back pain, gait problem, joint swelling, myalgias, neck pain and neck stiffness.   Skin: Negative for color change, pallor, rash and wound.   Allergic/Immunologic: Positive for environmental allergies. Negative for food allergies and immunocompromised state.   Neurological: Positive for weakness and numbness. Negative for dizziness, tremors, seizures, syncope, facial asymmetry, speech difficulty, light-headedness and headaches.   Hematological: Negative for adenopathy. Does not bruise/bleed easily.   Psychiatric/Behavioral: Negative for agitation, behavioral problems, confusion, decreased concentration, dysphoric mood, hallucinations, self-injury, sleep disturbance and suicidal ideas. The patient is not nervous/anxious and is not hyperactive.    All other systems reviewed and are negative.     Negative for any cardiac issues      Physical Examination:  Vitals:    06/10/21 1214   BP: 148/92   Temp: 97.1 °F (36.2 °C)      General Appearance:   Well developed, well nourished, well groomed, alert, and cooperative.  Neurological examination:  Neurologic Exam  Vital signs were reviewed and documented in the chart  Patient appeared in good neurologic function with normal comprehension fluent speech  Mood and affect are normal  Sense of smell deferred    Pupils symmetric equally reactive funduscopic exam not visualized   Visual fields intact to  confrontation  Extraocular movements intact  Face motor function is symmetric  Facial sensations normal  Hearing intact to finger rub hearing intact to finger rub  Tongue is midline  Palate symmetric  Swallowing normal  Shoulder shrug normal  Poor dentition  Muscle bulk and tone normal  5 out of 5 strength no motor drift, weakness right proximal lower extremity with right-sided 4-5 strength dorsiflexor  Completely spastic gait  Romberg not tested secondary to the gait instability  She is wildly myelopathic    Reflexes hyperreflexic throughout  No edema noted and extremities skin appears normal          Review of Imaging/DATA:  I reviewed the MRI personally and interpreted the findings she is got severe spinal stenosis at 3445 and 5 6 congenitally narrow canal with chronic myelomalacia mixed with some STIR signal change  Diagnoses/Plan:    Ms. Alfaro is a 55 y.o. female   I reviewed her scan she is 55-year-old she is clearly myelopathic for almost the last 6 months or so potentially worse in the last 3 months with a history of worsening falls but they report specifically nothing progressive in terms of immediacy in the last week or so.  She is having severe gait instability and there are social issues as far as her safety managing at home but she is accompanied by a friend who is assisting with her care    From my standpoint given the progressive gait instability, fall history and the quite terrible spastic myelopathy and severe cord compression with edema witnessed on the scans as well as her advanced travel history she is from almost 2-1/2 hours away she has social care issues it reasonable to admit her and take care of her cervical myelopathy.  We will do a posterior cervical laminectomy 3 level of explained the risk benefits expected outcome need for rehab etc. we will send her to the ER given her worsening gait instability asked the hospitalist to admit and preop her for potentially surgery tomorrow    We will  make arrangements for posterior cervical laminectomy cervical 3 through 5.

## 2021-06-11 ENCOUNTER — APPOINTMENT (OUTPATIENT)
Dept: GENERAL RADIOLOGY | Facility: HOSPITAL | Age: 56
End: 2021-06-11

## 2021-06-11 ENCOUNTER — ANESTHESIA (OUTPATIENT)
Dept: PERIOP | Facility: HOSPITAL | Age: 56
End: 2021-06-11

## 2021-06-11 LAB
GLUCOSE BLDC GLUCOMTR-MCNC: 159 MG/DL (ref 70–130)
QT INTERVAL: 386 MS
QTC INTERVAL: 407 MS

## 2021-06-11 PROCEDURE — 00NW0ZZ RELEASE CERVICAL SPINAL CORD, OPEN APPROACH: ICD-10-PCS | Performed by: NEUROLOGICAL SURGERY

## 2021-06-11 PROCEDURE — 25010000002 ONDANSETRON PER 1 MG: Performed by: NEUROLOGICAL SURGERY

## 2021-06-11 PROCEDURE — C1889 IMPLANT/INSERT DEVICE, NOC: HCPCS | Performed by: NEUROLOGICAL SURGERY

## 2021-06-11 PROCEDURE — 25010000002 NEOSTIGMINE 10 MG/10ML SOLUTION: Performed by: NURSE ANESTHETIST, CERTIFIED REGISTERED

## 2021-06-11 PROCEDURE — 63015 REMOVE SPINE LAMINA >2 CRVCL: CPT | Performed by: PHYSICIAN ASSISTANT

## 2021-06-11 PROCEDURE — 99233 SBSQ HOSP IP/OBS HIGH 50: CPT | Performed by: FAMILY MEDICINE

## 2021-06-11 PROCEDURE — C1713 ANCHOR/SCREW BN/BN,TIS/BN: HCPCS | Performed by: NEUROLOGICAL SURGERY

## 2021-06-11 PROCEDURE — 76000 FLUOROSCOPY <1 HR PHYS/QHP: CPT

## 2021-06-11 PROCEDURE — 82962 GLUCOSE BLOOD TEST: CPT

## 2021-06-11 PROCEDURE — 25010000002 METOCLOPRAMIDE PER 10 MG: Performed by: NEUROLOGICAL SURGERY

## 2021-06-11 PROCEDURE — 25010000002 ONDANSETRON PER 1 MG: Performed by: FAMILY MEDICINE

## 2021-06-11 PROCEDURE — 25010000002 DEXAMETHASONE SODIUM PHOSPHATE 10 MG/ML SOLUTION 1 ML VIAL: Performed by: NEUROLOGICAL SURGERY

## 2021-06-11 PROCEDURE — 25010000003 CEFAZOLIN IN DEXTROSE 2-4 GM/100ML-% SOLUTION: Performed by: PHYSICIAN ASSISTANT

## 2021-06-11 PROCEDURE — 25010000002 BUPRENORPHINE PER 0.1 MG: Performed by: NEUROLOGICAL SURGERY

## 2021-06-11 PROCEDURE — 25010000002 ONDANSETRON PER 1 MG: Performed by: NURSE ANESTHETIST, CERTIFIED REGISTERED

## 2021-06-11 PROCEDURE — 63015 REMOVE SPINE LAMINA >2 CRVCL: CPT | Performed by: NEUROLOGICAL SURGERY

## 2021-06-11 PROCEDURE — 25010000002 PROPOFOL 10 MG/ML EMULSION: Performed by: NURSE ANESTHETIST, CERTIFIED REGISTERED

## 2021-06-11 PROCEDURE — 25010000002 DEXAMETHASONE PER 1 MG: Performed by: NURSE ANESTHETIST, CERTIFIED REGISTERED

## 2021-06-11 DEVICE — FLOSEAL HEMOSTATIC MATRIX, 10ML
Type: IMPLANTABLE DEVICE | Site: SPINE CERVICAL | Status: FUNCTIONAL
Brand: FLOSEAL HEMOSTATIC MATRIX

## 2021-06-11 DEVICE — HEMOST ABS SURGIFOAM SZ100 8X12 10MM: Type: IMPLANTABLE DEVICE | Site: SPINE CERVICAL | Status: FUNCTIONAL

## 2021-06-11 RX ORDER — CEFAZOLIN SODIUM 2 G/100ML
2 INJECTION, SOLUTION INTRAVENOUS ONCE
Status: COMPLETED | OUTPATIENT
Start: 2021-06-11 | End: 2021-06-11

## 2021-06-11 RX ORDER — MIDAZOLAM HYDROCHLORIDE 1 MG/ML
1 INJECTION INTRAMUSCULAR; INTRAVENOUS
Status: DISCONTINUED | OUTPATIENT
Start: 2021-06-11 | End: 2021-06-11 | Stop reason: HOSPADM

## 2021-06-11 RX ORDER — SODIUM CHLORIDE, SODIUM LACTATE, POTASSIUM CHLORIDE, CALCIUM CHLORIDE 600; 310; 30; 20 MG/100ML; MG/100ML; MG/100ML; MG/100ML
9 INJECTION, SOLUTION INTRAVENOUS CONTINUOUS
Status: DISCONTINUED | OUTPATIENT
Start: 2021-06-11 | End: 2021-06-13 | Stop reason: HOSPADM

## 2021-06-11 RX ORDER — NEOSTIGMINE METHYLSULFATE 1 MG/ML
INJECTION, SOLUTION INTRAVENOUS AS NEEDED
Status: DISCONTINUED | OUTPATIENT
Start: 2021-06-11 | End: 2021-06-11 | Stop reason: SURG

## 2021-06-11 RX ORDER — BUPIVACAINE HCL/0.9 % NACL/PF 0.125 %
PLASTIC BAG, INJECTION (ML) EPIDURAL AS NEEDED
Status: DISCONTINUED | OUTPATIENT
Start: 2021-06-11 | End: 2021-06-11 | Stop reason: SURG

## 2021-06-11 RX ORDER — FENTANYL CITRATE 50 UG/ML
50 INJECTION, SOLUTION INTRAMUSCULAR; INTRAVENOUS
Status: DISCONTINUED | OUTPATIENT
Start: 2021-06-11 | End: 2021-06-11 | Stop reason: HOSPADM

## 2021-06-11 RX ORDER — SODIUM CHLORIDE 0.9 % (FLUSH) 0.9 %
3 SYRINGE (ML) INJECTION EVERY 12 HOURS SCHEDULED
Status: DISCONTINUED | OUTPATIENT
Start: 2021-06-11 | End: 2021-06-13 | Stop reason: HOSPADM

## 2021-06-11 RX ORDER — ACETAMINOPHEN 325 MG/1
650 TABLET ORAL EVERY 4 HOURS PRN
Status: DISCONTINUED | OUTPATIENT
Start: 2021-06-11 | End: 2021-06-13 | Stop reason: HOSPADM

## 2021-06-11 RX ORDER — HYDROMORPHONE HYDROCHLORIDE 1 MG/ML
0.5 INJECTION, SOLUTION INTRAMUSCULAR; INTRAVENOUS; SUBCUTANEOUS
Status: DISCONTINUED | OUTPATIENT
Start: 2021-06-11 | End: 2021-06-13 | Stop reason: HOSPADM

## 2021-06-11 RX ORDER — LIDOCAINE HYDROCHLORIDE AND EPINEPHRINE 5; 5 MG/ML; UG/ML
INJECTION, SOLUTION INFILTRATION; PERINEURAL AS NEEDED
Status: DISCONTINUED | OUTPATIENT
Start: 2021-06-11 | End: 2021-06-11 | Stop reason: HOSPADM

## 2021-06-11 RX ORDER — LIDOCAINE HYDROCHLORIDE 10 MG/ML
0.5 INJECTION, SOLUTION EPIDURAL; INFILTRATION; INTRACAUDAL; PERINEURAL ONCE AS NEEDED
Status: DISCONTINUED | OUTPATIENT
Start: 2021-06-11 | End: 2021-06-11 | Stop reason: HOSPADM

## 2021-06-11 RX ORDER — SODIUM CHLORIDE 0.9 % (FLUSH) 0.9 %
10 SYRINGE (ML) INJECTION AS NEEDED
Status: DISCONTINUED | OUTPATIENT
Start: 2021-06-11 | End: 2021-06-11 | Stop reason: HOSPADM

## 2021-06-11 RX ORDER — HYDROCODONE BITARTRATE AND ACETAMINOPHEN 5; 325 MG/1; MG/1
1 TABLET ORAL EVERY 4 HOURS PRN
Status: DISCONTINUED | OUTPATIENT
Start: 2021-06-11 | End: 2021-06-13 | Stop reason: HOSPADM

## 2021-06-11 RX ORDER — SODIUM CHLORIDE 0.9 % (FLUSH) 0.9 %
10 SYRINGE (ML) INJECTION AS NEEDED
Status: DISCONTINUED | OUTPATIENT
Start: 2021-06-11 | End: 2021-06-13 | Stop reason: HOSPADM

## 2021-06-11 RX ORDER — DROPERIDOL 2.5 MG/ML
0.62 INJECTION, SOLUTION INTRAMUSCULAR; INTRAVENOUS ONCE AS NEEDED
Status: DISCONTINUED | OUTPATIENT
Start: 2021-06-11 | End: 2021-06-11 | Stop reason: HOSPADM

## 2021-06-11 RX ORDER — GLYCOPYRROLATE 0.2 MG/ML
INJECTION INTRAMUSCULAR; INTRAVENOUS AS NEEDED
Status: DISCONTINUED | OUTPATIENT
Start: 2021-06-11 | End: 2021-06-11 | Stop reason: SURG

## 2021-06-11 RX ORDER — SODIUM CHLORIDE 0.9 % (FLUSH) 0.9 %
10 SYRINGE (ML) INJECTION EVERY 12 HOURS SCHEDULED
Status: DISCONTINUED | OUTPATIENT
Start: 2021-06-11 | End: 2021-06-11 | Stop reason: HOSPADM

## 2021-06-11 RX ORDER — DEXAMETHASONE SODIUM PHOSPHATE 4 MG/ML
INJECTION, SOLUTION INTRA-ARTICULAR; INTRALESIONAL; INTRAMUSCULAR; INTRAVENOUS; SOFT TISSUE AS NEEDED
Status: DISCONTINUED | OUTPATIENT
Start: 2021-06-11 | End: 2021-06-11 | Stop reason: SURG

## 2021-06-11 RX ORDER — NALOXONE HCL 0.4 MG/ML
0.4 VIAL (ML) INJECTION
Status: DISCONTINUED | OUTPATIENT
Start: 2021-06-11 | End: 2021-06-13 | Stop reason: HOSPADM

## 2021-06-11 RX ORDER — LIDOCAINE HYDROCHLORIDE 10 MG/ML
INJECTION, SOLUTION EPIDURAL; INFILTRATION; INTRACAUDAL; PERINEURAL AS NEEDED
Status: DISCONTINUED | OUTPATIENT
Start: 2021-06-11 | End: 2021-06-11 | Stop reason: SURG

## 2021-06-11 RX ORDER — ONDANSETRON 2 MG/ML
4 INJECTION INTRAMUSCULAR; INTRAVENOUS ONCE
Status: COMPLETED | OUTPATIENT
Start: 2021-06-11 | End: 2021-06-11

## 2021-06-11 RX ORDER — BACLOFEN 10 MG/1
10 TABLET ORAL EVERY 8 HOURS SCHEDULED
Status: DISCONTINUED | OUTPATIENT
Start: 2021-06-11 | End: 2021-06-13 | Stop reason: HOSPADM

## 2021-06-11 RX ORDER — ONDANSETRON 2 MG/ML
4 INJECTION INTRAMUSCULAR; INTRAVENOUS EVERY 6 HOURS PRN
Status: DISCONTINUED | OUTPATIENT
Start: 2021-06-11 | End: 2021-06-13 | Stop reason: HOSPADM

## 2021-06-11 RX ORDER — HYDROCODONE BITARTRATE AND ACETAMINOPHEN 5; 325 MG/1; MG/1
1 TABLET ORAL EVERY 4 HOURS PRN
Qty: 40 TABLET | Refills: 0 | Status: SHIPPED | OUTPATIENT
Start: 2021-06-11 | End: 2021-06-24

## 2021-06-11 RX ORDER — ONDANSETRON 2 MG/ML
4 INJECTION INTRAMUSCULAR; INTRAVENOUS ONCE AS NEEDED
Status: DISCONTINUED | OUTPATIENT
Start: 2021-06-11 | End: 2021-06-11 | Stop reason: HOSPADM

## 2021-06-11 RX ORDER — MAGNESIUM HYDROXIDE 1200 MG/15ML
LIQUID ORAL AS NEEDED
Status: DISCONTINUED | OUTPATIENT
Start: 2021-06-11 | End: 2021-06-11 | Stop reason: HOSPADM

## 2021-06-11 RX ORDER — TEMAZEPAM 15 MG/1
15 CAPSULE ORAL NIGHTLY PRN
Status: DISCONTINUED | OUTPATIENT
Start: 2021-06-11 | End: 2021-06-13 | Stop reason: HOSPADM

## 2021-06-11 RX ORDER — ACETAMINOPHEN 500 MG
1000 TABLET ORAL ONCE
Status: COMPLETED | OUTPATIENT
Start: 2021-06-11 | End: 2021-06-11

## 2021-06-11 RX ORDER — PROPOFOL 10 MG/ML
VIAL (ML) INTRAVENOUS AS NEEDED
Status: DISCONTINUED | OUTPATIENT
Start: 2021-06-11 | End: 2021-06-11 | Stop reason: SURG

## 2021-06-11 RX ORDER — HYDROMORPHONE HYDROCHLORIDE 1 MG/ML
0.5 INJECTION, SOLUTION INTRAMUSCULAR; INTRAVENOUS; SUBCUTANEOUS
Status: DISCONTINUED | OUTPATIENT
Start: 2021-06-11 | End: 2021-06-11 | Stop reason: HOSPADM

## 2021-06-11 RX ORDER — IBUPROFEN 800 MG/1
800 TABLET ORAL ONCE
Status: COMPLETED | OUTPATIENT
Start: 2021-06-11 | End: 2021-06-11

## 2021-06-11 RX ORDER — ROCURONIUM BROMIDE 10 MG/ML
INJECTION, SOLUTION INTRAVENOUS AS NEEDED
Status: DISCONTINUED | OUTPATIENT
Start: 2021-06-11 | End: 2021-06-11 | Stop reason: SURG

## 2021-06-11 RX ORDER — OXYCODONE HCL 10 MG/1
10 TABLET, FILM COATED, EXTENDED RELEASE ORAL ONCE
Status: COMPLETED | OUTPATIENT
Start: 2021-06-11 | End: 2021-06-11

## 2021-06-11 RX ORDER — METHOCARBAMOL 750 MG/1
750 TABLET, FILM COATED ORAL 4 TIMES DAILY PRN
Qty: 90 TABLET | Refills: 0 | Status: SHIPPED | OUTPATIENT
Start: 2021-06-11 | End: 2021-06-24

## 2021-06-11 RX ORDER — ONDANSETRON 2 MG/ML
INJECTION INTRAMUSCULAR; INTRAVENOUS AS NEEDED
Status: DISCONTINUED | OUTPATIENT
Start: 2021-06-11 | End: 2021-06-11 | Stop reason: SURG

## 2021-06-11 RX ORDER — METOCLOPRAMIDE HYDROCHLORIDE 5 MG/ML
5 INJECTION INTRAMUSCULAR; INTRAVENOUS EVERY 4 HOURS PRN
Status: DISCONTINUED | OUTPATIENT
Start: 2021-06-11 | End: 2021-06-13 | Stop reason: HOSPADM

## 2021-06-11 RX ORDER — FAMOTIDINE 20 MG/1
20 TABLET, FILM COATED ORAL ONCE
Status: COMPLETED | OUTPATIENT
Start: 2021-06-11 | End: 2021-06-11

## 2021-06-11 RX ADMIN — DEXAMETHASONE SODIUM PHOSPHATE 8 MG: 4 INJECTION, SOLUTION INTRA-ARTICULAR; INTRALESIONAL; INTRAMUSCULAR; INTRAVENOUS; SOFT TISSUE at 10:36

## 2021-06-11 RX ADMIN — ACETAMINOPHEN 650 MG: 325 TABLET, FILM COATED ORAL at 23:33

## 2021-06-11 RX ADMIN — ATORVASTATIN CALCIUM 40 MG: 40 TABLET, FILM COATED ORAL at 20:09

## 2021-06-11 RX ADMIN — ACETAMINOPHEN 1000 MG: 500 TABLET ORAL at 08:35

## 2021-06-11 RX ADMIN — Medication 80 MCG: at 10:30

## 2021-06-11 RX ADMIN — LIDOCAINE HYDROCHLORIDE 50 MG: 10 INJECTION, SOLUTION EPIDURAL; INFILTRATION; INTRACAUDAL; PERINEURAL at 10:23

## 2021-06-11 RX ADMIN — IBUPROFEN 800 MG: 800 TABLET, FILM COATED ORAL at 08:35

## 2021-06-11 RX ADMIN — OXYCODONE HYDROCHLORIDE 10 MG: 10 TABLET, FILM COATED, EXTENDED RELEASE ORAL at 08:35

## 2021-06-11 RX ADMIN — KETOTIFEN FUMARATE 1 DROP: 0.35 SOLUTION/ DROPS OPHTHALMIC at 20:10

## 2021-06-11 RX ADMIN — ONDANSETRON 4 MG: 2 INJECTION INTRAMUSCULAR; INTRAVENOUS at 13:29

## 2021-06-11 RX ADMIN — PROPOFOL 200 MG: 10 INJECTION, EMULSION INTRAVENOUS at 10:23

## 2021-06-11 RX ADMIN — Medication 80 MCG: at 10:39

## 2021-06-11 RX ADMIN — CEFAZOLIN SODIUM 2 G: 10 INJECTION, POWDER, FOR SOLUTION INTRAVENOUS at 10:23

## 2021-06-11 RX ADMIN — Medication 80 MCG: at 10:34

## 2021-06-11 RX ADMIN — ONDANSETRON 4 MG: 2 INJECTION INTRAMUSCULAR; INTRAVENOUS at 16:54

## 2021-06-11 RX ADMIN — SODIUM CHLORIDE, POTASSIUM CHLORIDE, SODIUM LACTATE AND CALCIUM CHLORIDE: 600; 310; 30; 20 INJECTION, SOLUTION INTRAVENOUS at 11:47

## 2021-06-11 RX ADMIN — ONDANSETRON 4 MG: 2 INJECTION INTRAMUSCULAR; INTRAVENOUS at 11:52

## 2021-06-11 RX ADMIN — PROPOFOL 50 MG: 10 INJECTION, EMULSION INTRAVENOUS at 10:30

## 2021-06-11 RX ADMIN — SODIUM CHLORIDE, POTASSIUM CHLORIDE, SODIUM LACTATE AND CALCIUM CHLORIDE 9 ML/HR: 600; 310; 30; 20 INJECTION, SOLUTION INTRAVENOUS at 08:42

## 2021-06-11 RX ADMIN — NEOSTIGMINE 3 MG: 1 INJECTION INTRAVENOUS at 11:52

## 2021-06-11 RX ADMIN — BACLOFEN 10 MG: 10 TABLET ORAL at 20:09

## 2021-06-11 RX ADMIN — METOCLOPRAMIDE 5 MG: 5 INJECTION, SOLUTION INTRAMUSCULAR; INTRAVENOUS at 17:49

## 2021-06-11 RX ADMIN — FAMOTIDINE 20 MG: 20 TABLET ORAL at 08:35

## 2021-06-11 RX ADMIN — LOSARTAN POTASSIUM 50 MG: 50 TABLET, FILM COATED ORAL at 20:09

## 2021-06-11 RX ADMIN — ROCURONIUM BROMIDE 50 MG: 10 INJECTION, SOLUTION INTRAVENOUS at 10:23

## 2021-06-11 RX ADMIN — GLYCOPYRROLATE 0.4 MG: 0.4 INJECTION INTRAMUSCULAR; INTRAVENOUS at 11:52

## 2021-06-11 RX ADMIN — SODIUM CHLORIDE, PRESERVATIVE FREE 3 ML: 5 INJECTION INTRAVENOUS at 20:10

## 2021-06-11 RX ADMIN — BACLOFEN 10 MG: 10 TABLET ORAL at 16:53

## 2021-06-11 NOTE — ANESTHESIA PREPROCEDURE EVALUATION
Anesthesia Evaluation     Patient summary reviewed and Nursing notes reviewed                Airway   Mallampati: II  TM distance: >3 FB  Neck ROM: limited  Possible difficult intubation  Dental - normal exam     Pulmonary - negative pulmonary ROS and normal exam   Cardiovascular     (+) hypertension, murmur, hyperlipidemia,     ROS comment:   Stress/echo 12/20: normal EF, no significant valve disease; negative for ischemia    Neuro/Psych- negative ROS  GI/Hepatic/Renal/Endo - negative ROS     Musculoskeletal     Abdominal  - normal exam    Bowel sounds: normal.   Substance History - negative use     OB/GYN negative ob/gyn ROS         Other   arthritis,                    Anesthesia Plan    ASA 2     general   (Glidescope/tran)  intravenous induction     Anesthetic plan, all risks, benefits, and alternatives have been provided, discussed and informed consent has been obtained with: patient.    Plan discussed with CRNA.

## 2021-06-11 NOTE — CASE MANAGEMENT/SOCIAL WORK
Discharge Planning Assessment  [facility]     Patient Name: Diana Alfaro  MRN: [id number]  Today's Date: 6/11/2021    Admit Date: 6/10/2021    Discharge Needs Assessment     Row Name 06/11/21 1237       Living Environment    Lives With  alone    Current Living Arrangements  home/apartment/condo    Family Caregiver if Needed  sibling(s);friend(s)    Quality of Family Relationships  helpful;involved;supportive    Able to Return to Prior Arrangements  yes       Resource/Environmental Concerns    Resource/Environmental Concerns  home accessibility    Home Accessibility Concerns  stairs to enter home    Transportation Concerns  car, none       Transition Planning    Patient/Family Anticipates Transition to  home with family    Transportation Anticipated  family or friend will provide       Discharge Needs Assessment    Readmission Within the Last 30 Days  no previous admission in last 30 days    Equipment Currently Used at Home  none    Equipment Needed After Discharge  -- Follow for DME needs after PT evaluates post op        Discharge Plan     Row Name 06/11/21 1239       Plan    Plan  Home with family assistance    Patient/Family in Agreement with Plan  yes    Plan Comments  Contacted Ms. Alfaro's sister, Nisreen Bautista, by phone for discharge planning.  Ms. Alfaro is currently in the OR.  Ms. Alfaro lives alone in [facility].  Prior to admission, she ambulated independently with no DME assistance.  She has never been to [facility] or had home health in the past.  Nisreen states that she and their friend, Natalia Garay, are available to assist the patient when she returns home.   Received referral from Dr. Sheriff for discharge planning, home health needs. Ms. Alfaro will be evaluated by PT post operatively.    CM will follow up and assist with any discharge needs.  Weekend CM .    Final Discharge Disposition Code  01 - home or self-care        Continued Care and Services - Admitted Since 6/10/2021    Coordination has  not been started for this encounter.         Demographic Summary     Row Name 06/11/21 1236       General Information    Admission Type  inpatient    Arrived From  home    Reason for Consult  discharge planning    General Information Comments  PCP:  Anusha Lu       Contact Information    Permission Granted to Share Info With      Contact Information Comments  Sister:  Nisreen Bautista,  [phone]        Functional Status     Row Name 06/11/21 1237       Functional Status    Usual Activity Tolerance  moderate    Current Activity Tolerance  -- Pending PT evaluation       Functional Status, IADL    Medications  independent    Meal Preparation  independent    Housekeeping  independent    Laundry  independent    Shopping  independent        Psychosocial    No documentation.       Abuse/Neglect    No documentation.       Legal    No documentation.       Substance Abuse    No documentation.       Patient Forms    No documentation.           Floridalma Marmolejo RN

## 2021-06-11 NOTE — PROGRESS NOTES
HOD# : 1    No events last night      Cervical spondylosis with myelopathy    Essential hypertension    Hyperlipidemia      Temp:  [97.1 °F (36.2 °C)-98.6 °F (37 °C)] 98.6 °F (37 °C)  Heart Rate:  [73-81] 75  Resp:  [16-18] 16  BP: (130-165)/(67-92) 137/79  No intake/output data recorded.  No intake/output data recorded.  Vital signs were reviewed and documented in the chart      EXAM   MYELOPATHIC         PLAN   TERRIBLE MYELOPATHY  PLAN CERVICAL LAMINECTOMY

## 2021-06-11 NOTE — PAYOR COMM NOTE
"Barbara Arceo RN  Utilization Review  P: [phone]  F: [phone]    Member ID = [id number]  Initial notification & clinicals for inpatient auth    Diana Alfaro (55 y.o. Female)     Date of Birth Social Security Number Address Home Phone MRN    1965  [address] [place] [address] [phone] [id number]    [employer] Marital Status          [facility] Single       Admission Date Admission Type Admitting Provider Attending Provider Department, Room/Bed    6/10/21 Emergency Eliza Escobedo DO [place], Sarah Kathleen, DO [facility] OR, MICHAEL OR/MAIN OR    Discharge Date Discharge Disposition Discharge Destination                       Attending Provider: Eliza Escobedo DO    Allergies: Amlodipine, Beta Adrenergic Blockers    Isolation: None   Infection: None   Code Status: CPR    Ht: 152.4 cm (60\")   Wt: 65.8 kg (145 lb)    Admission Cmt: None   Principal Problem: Cervical spondylosis with myelopathy [M47.12] More...                 Active Insurance as of 6/10/2021     Primary Coverage     Payor Plan Insurance Group Employer/Plan Group    [employer] [place] [employer] [place]ICA[place]      Payor Plan Address Payor Plan Phone Number Payor Plan Fax Number Effective Dates    [address] [address] [phone]  10/26/2016 - None Entered    [facility] [id number]       Subscriber Name Subscriber Birth Date Member ID       DIANA ALFARO 1965 [id number]                 Emergency Contacts      (Rel.) Home Phone Work Phone Mobile Phone    Nisreen Bautista (Sister) [phone] -- [phone]    Natalia Garay (Friend) [phone] -- [phone]               History & Physical      Bisi Guerin MD at 06/10/21 [address] [place] [address] Medicine Services  HISTORY AND PHYSICAL    Patient Name: Diana Alfaro  : 1965  MRN: [id number]  Primary Care Physician: Anusha Licea MD    Subjective   Subjective     Chief " Complaint:    HPI:  Diana Alfaro is a 55 y.o. female with history of hypertension, hyperlipidemia who has had progressive difficulty walking as well as some progressive weakness and tingling in her fingers.  Patient was seen by neurosurgery clinic today and found to have critical cervical stenosis and was recommended to be seen in the emergency room for admission.  Patient reports her greatest difficulty is walking any distance she says her legs feel heavy and just do not work like the should.  She also has occasional headaches.  She denies any difficulty breathing.    Review of Systems   Patient denies weight loss, changes in vision, fever, chills, sore throat, shortness of breath, cough, nausea or vomiting, diarrhea, abdominal pain or distension, change in urine output or habits, joint pain, rash, itching or bleeding.  Some ankle edema  Otherwise complete 10-system ROS is negative except as mentioned in the HPI.    Personal History     Past Medical History:   Diagnosis Date   • Arthritis    • Chest pain    • Club foot    • Deviated septum    • Elevated cholesterol    • Environmental allergies    • Gout    • Hyperlipidemia    • Hypertension    • Sinusitis        Past Surgical History:   Procedure Laterality Date   • CHOLECYSTECTOMY     • FOOT SURGERY      s/p clubfoot, R   • SEPTOPLASTY, RESECTION INFERIOR TURBINATES Bilateral 7/18/2018    Procedure: SEPTOPLASTY ONLY;  Surgeon: Lorenzo Ordoñez MD;  Location: [facility];  Service: ENT       Family History: family history includes Heart attack in her father, maternal aunt, maternal grandfather, maternal grandmother, maternal uncle, mother, paternal aunt, paternal grandfather, paternal grandmother, and paternal uncle.     Social History:  reports that she has never smoked. She has never used smokeless tobacco. She reports that she does not drink alcohol and does not use drugs.  Lives alone, has a dog and a cat, worked for school system, last worked  1998    Medications:  Calcium-Mag-Vit C-Vit D, Caltrate 600+D Plus Minerals, Multiple Minerals, Plant Sterol Stanol-Pantethine, atorvastatin, fluticasone, ibuprofen, ketotifen, loratadine, losartan, and vitamin D      Allergies   Allergen Reactions   • Amlodipine Unknown - Low Severity   • Beta Adrenergic Blockers Other (See Comments)     bradycardia       Objective   Objective     Vital Signs:   Temp:  [97.1 °F (36.2 °C)-98.4 °F (36.9 °C)] 98.4 °F (36.9 °C)  Heart Rate:  [73-81] 81  Resp:  [16-18] 18  BP: (139-165)/(67-92) 139/67    Patient is alert and talkative in no distress at rest she is lying in bed very still, she has limited lateral range of motion of her neck worse to the right  Neck is without mass or JVD  Heart is Reg wo murmur  Lungs are clear wo wheeze or crackle  Abd is soft without HSM or mass, not tender or distended  MAEW, she has generally good strength, but was not ambulated due to the concern witnessed in neurosurgery clinic.  Skin is without rash  Neurologic exam is nonfocal   Mood is appropriate      Result Review:  I have personally reviewed the results from the time of admission and agree with these findings:  [x]  Laboratory  [x]  Radiology  []  EKG/Telemetry   []  Pathology  []  Old records  []  Other:  Most notable findings include: Markedly abnormal MRI per Dr. Sheriff she has terrible spastic myelopathy and severe cord compression with edema seen on her scans    LAB RESULTS:      Lab 06/10/21  1704   WBC 7.36   HEMOGLOBIN 13.5   HEMATOCRIT 41.4   PLATELETS 259   NEUTROS ABS 4.64   IMMATURE GRANS (ABS) 0.01   LYMPHS ABS 2.03   MONOS ABS 0.56   EOS ABS 0.09   MCV 86.4   SED RATE 16         Lab 06/10/21  1704   SODIUM 142   POTASSIUM 3.8   CHLORIDE 105   CO2 28.0   ANION GAP 9.0   BUN 13   CREATININE 0.71   GLUCOSE 94   CALCIUM 9.8                         Brief Urine Lab Results     None        Microbiology Results (last 10 days)     Procedure Component Value - Date/Time    COVID-19 and FLU  A/B PCR - Swab, Nasopharynx [[id number]]  (Normal) Collected: 06/10/21 1801    Lab Status: Final result Specimen: Swab from Nasopharynx Updated: 06/10/21 1843     COVID19 Not Detected     Influenza A PCR Not Detected     Influenza B PCR Not Detected    Narrative:      Fact sheet for providers: https://www.fda.gov/media/480988/download    Fact sheet for patients: https://www.fda.gov/media/132619/download    Test performed by PCR.          MRI outside films    Result Date: 6/10/2021  This procedure was auto-finalized with no dictation required.    MRI outside films    Result Date: 6/10/2021  This procedure was auto-finalized with no dictation required.      Results for orders placed during the hospital encounter of 12/22/20    Adult Stress Echo W/ Cont or Stress Agent if Necessary Per Protocol    Interpretation Summary  · Stress Procedure:  · Pharmacological stress test was performed using dobutamine without low-level exercise.  · Patient denied any chest discomfort during infusion  · There was no ST segment deviation noted during stress.  · There were no significant arrhythmias noted during stress  · No ECG evidence of myocardial ischemia  · Findings consistent with a normal ECG stress test.  · Echocardiogram Findings:  · Normal left ventricular cavity size and wall thickness noted. All left ventricular wall segments contract normally.  · Left ventricular ejection fraction appears to be 61 - 65%.  · The aortic valve is not well visualized. No aortic valve regurgitation or stenosis is present. The aortic valve is grossly normal in structure.  · The mitral valve is structurally normal with no regurgitation or significant stenosis present.  · There is no evidence of pericardial effusion. .  · Stress Echo Findings:  · Segment augmentation had a normal response to stress  · Cavity size behaved normally in response to stress. Left ventricular function is normal. Septal wall motion is normal.  · Normal stress echo with no  significant echocardiographic evidence for myocardial ischemia.      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  -Discussed with Nahid Estrella with Dr. Sheriff  -Agreed to give 1 dose of IV Decadron  -Plan for surgical posterior cervical laminectomy at 3 levels      DVT prophylaxis:SCDs  CODE STATUS:FULL CODE    Admission Status: INPATIENT status due to severe myelopathy and critical cervical spine stenosis.  I feel patient’s risk for adverse outcomes and need for care warrant INPATIENT evaluation and I predict the patient’s care encounter to likely last beyond 2 midnights.    Electronically signed by Bisi Guerin MD 06/10/21 20:46 EDT            Electronically signed by Bisi Guerin MD at 06/10/21 2049          Emergency Department Notes      Cliff Rios MD at 06/10/21 1743          Subjective   Pt presents with neck pain and extremity weakness.  Several month history of these sx - both arms and left leg primarily.  She has been seeing Dr Sheriff, had MRIs as outpatient.  She has been trying conservative mgmt but her weakness has been progressive over the last week.  She saw Jaziel in the office today and was sent to ED for admission for surgery tomorrow.    She denies fever, cough, myalgias, loss of taste/smell.  She has not gotten the covid vaccine and does not plan to.      History provided by:  Patient      Review of Systems   Constitutional: Negative for fever.   Respiratory: Negative for  cough.    Musculoskeletal: Positive for neck pain. Negative for myalgias.   Neurological: Positive for weakness.   All other systems reviewed and are negative.      Past Medical History:   Diagnosis Date   • Arthritis    • Chest pain    • Club foot    • Deviated septum    • Elevated cholesterol    • Environmental allergies    • Gout    • Hyperlipidemia    • Hypertension    • Sinusitis        Allergies   Allergen Reactions   • Amlodipine Unknown - Low Severity   • Beta Adrenergic Blockers Other (See Comments)     bradycardia       Past Surgical History:   Procedure Laterality Date   • CHOLECYSTECTOMY     • FOOT SURGERY      s/p clubfoot, R   • SEPTOPLASTY, RESECTION INFERIOR TURBINATES Bilateral 7/18/2018    Procedure: SEPTOPLASTY ONLY;  Surgeon: Lorenzo Ordoñez MD;  Location: [facility];  Service: ENT       Family History   Problem Relation Age of Onset   • Heart attack Mother    • Heart attack Father    • Heart attack Maternal Aunt    • Heart attack Maternal Uncle    • Heart attack Paternal Aunt    • Heart attack Paternal Uncle    • Heart attack Maternal Grandmother    • Heart attack Maternal Grandfather    • Heart attack Paternal Grandmother    • Heart attack Paternal Grandfather        Social History     Socioeconomic History   • Marital status: Single     Spouse name: Not on file   • Number of children: Not on file   • Years of education: Not on file   • Highest education level: Not on file   Tobacco Use   • Smoking status: Never Smoker   • Smokeless tobacco: Never Used   Substance and Sexual Activity   • Alcohol use: No   • Drug use: No           Objective   Physical Exam  Vitals and nursing note reviewed.   Constitutional:       General: She is not in acute distress.     Appearance: Normal appearance. She is not ill-appearing.   HENT:      Head: Normocephalic and atraumatic.      Mouth/Throat:      Mouth: Mucous membranes are moist.   Eyes:      General: No scleral icterus.        Right eye: No discharge.          Left eye: No discharge.      Conjunctiva/sclera: Conjunctivae normal.   Cardiovascular:      Rate and Rhythm: Normal rate and regular rhythm.      Heart sounds: No murmur heard.     Pulmonary:      Effort: Pulmonary effort is normal. No respiratory distress.      Breath sounds: Normal breath sounds. No wheezing.   Abdominal:      General: Bowel sounds are normal. There is no distension.      Palpations: Abdomen is soft.      Tenderness: There is no abdominal tenderness. There is no guarding or rebound.   Musculoskeletal:         General: No swelling. Normal range of motion.      Cervical back: Normal range of motion and neck supple.   Skin:     General: Skin is warm and dry.      Findings: No rash.   Neurological:      Mental Status: She is alert and oriented to person, place, and time.      Motor: Weakness present.   Psychiatric:         Mood and Affect: Mood normal.         Behavior: Behavior normal.         Thought Content: Thought content normal.         Procedures          ED Course         Reviewed Dr Sheriff's note from today, confirms history stated by patient above.  Plan for operative repair.  Pt admitted.                                  MDM  Number of Diagnoses or Management Options     Amount and/or Complexity of Data Reviewed  Clinical lab tests: ordered and reviewed  Decide to obtain previous medical records or to obtain history from someone other than the patient: yes  Review and summarize past medical records: yes  Discuss the patient with other providers: yes        Final diagnoses:   Cervical myelopathy (CMS/HCC)       ED Disposition  ED Disposition     ED Disposition Condition Comment    Decision to Admit  Level of Care: Telemetry [5]   Diagnosis: Cervical myelopathy (CMS/HCC) [711734]   Admitting Physician: HARSHA DAVENPORT [[id number]]   Attending Physician: HARSHA DAVENPORT [[id number]]   Certification: I Certify That Inpatient Hospital Services Are Medically Necessary For Greater Than 2 Midnights             No follow-up provider specified.       Medication List      No changes were made to your prescriptions during this visit.          Cliff Rios MD  06/10/21 1746      Electronically signed by Cliff Rios MD at 06/10/21 1746       Physician Progress Notes (all)    No notes of this type exist for this encounter.            Consult Notes (all)      Nahid Estrella PA-C at 06/10/21 1633          Consults    Referring Provider: Gabriel ALEJO    Patient Care Team:  Anusha Licea MD as PCP - General (Internal Medicine)    Chief Complaint: Progressive cervical myelopathy    Subjective .     History of present illness:       Patient is nice 55-year-old female who came to the ER after being seen by Dr. Nacho Sheriff secondary to progressive cervical myelopathy.  She is known to have a clubfoot congenitally with multiple issues surrounding arthritis/gout.      For the last 3 months she has had progressive gait instability she does have history of recent falls.  Patient has been having worsening spasticity and urinary retention since those falls.    Patient was sent to the ER secondary to the progressive nature of her myelopathy.    Patient was seen in the office with a MRI that shows critical stenosis with spinal cord edema most prominent at C4-5.  Patient is a very bad fall risk and is very unsteady on her feet secondary to the myelopathy.  If she were to fall she could permanently damage herself and some of the symptoms that she is already experienced may be permanent that is at this point.  This was reviewed with patient and mother in the ER.    Plan is to get her admitted to the hospitalist and get her worked up for general medical clearance for a C3-5 posterior cervical laminectomy.  She has severe gait instability.  Generally, she does have social issues as far as her safety goes and may need rehabilitation after the procedure.    Review of Systems   Constitutional: Negative for  activity change, appetite change, chills, fatigue and fever.   HENT: Negative for congestion, dental problem, ear pain, hearing loss, sinus pressure and tinnitus.    Eyes: Negative for pain and redness.   Respiratory: Negative for apnea, cough, shortness of breath and wheezing.    Cardiovascular: Negative for chest pain, palpitations and leg swelling.   Gastrointestinal: Negative for abdominal distention, abdominal pain, blood in stool, constipation, diarrhea, nausea and vomiting.   Endocrine: Negative for cold intolerance, heat intolerance and polyuria.   Genitourinary: Negative for enuresis, frequency and urgency.   Skin: Negative for color change and rash.   Neurological: Negative for dizziness, tremors, seizures, syncope, speech difficulty, weakness, light-headedness, numbness and headaches.   Psychiatric/Behavioral: Negative for behavioral problems and confusion. The patient is not nervous/anxious.        History  Past Medical History:   Diagnosis Date   • Arthritis    • Chest pain    • Club foot    • Deviated septum    • Elevated cholesterol    • Environmental allergies    • Gout    • Hyperlipidemia    • Hypertension    • Sinusitis    ,   Past Surgical History:   Procedure Laterality Date   • CHOLECYSTECTOMY     • FOOT SURGERY      s/p clubfoot, R   • SEPTOPLASTY, RESECTION INFERIOR TURBINATES Bilateral 7/18/2018    Procedure: SEPTOPLASTY ONLY;  Surgeon: Lorenzo Ordoñez MD;  Location: [facility];  Service: ENT   ,   Family History   Problem Relation Age of Onset   • Heart attack Mother    • Heart attack Father    • Heart attack Maternal Aunt    • Heart attack Maternal Uncle    • Heart attack Paternal Aunt    • Heart attack Paternal Uncle    • Heart attack Maternal Grandmother    • Heart attack Maternal Grandfather    • Heart attack Paternal Grandmother    • Heart attack Paternal Grandfather    ,   Social History     Socioeconomic History   • Marital status: Single     Spouse name: Not on file   • Number of  children: Not on file   • Years of education: Not on file   • Highest education level: Not on file   Tobacco Use   • Smoking status: Never Smoker   • Smokeless tobacco: Never Used   Substance and Sexual Activity   • Alcohol use: No   • Drug use: No     E-cigarette/Vaping     E-cigarette/Vaping Substances     E-cigarette/Vaping Devices       , (Not in a hospital admission)  , Scheduled Meds:   , Continuous Infusions:   , PRN Meds:  •  Insert peripheral IV **AND** sodium chloride and Allergies:  Amlodipine and Beta adrenergic blockers   SMOKING STATUS: Non-smoker  Objective     Vital Signs   Temp:  [97.1 °F (36.2 °C)-98.4 °F (36.9 °C)] 98.4 °F (36.9 °C)  Heart Rate:  [73] 73  Resp:  [16] 16  BP: (148-165)/(89-92) 165/89  Body mass index is 28.32 kg/m².    Physical Exam:     Body mass index is 28.32 kg/m².    GENERAL:           The patient is in no acute distress, and is able to answer all questions appropriately.    Neck:          Supple without lymphadenopathy    Cardiovascular:       Peripheral pulses 2+ at dorsalis pedis and posterior tibialis    Lungs:         Breathing unlabored    Musculoskeletal:            strength is 5 out of 5 bilaterally.        Shoulder abduction is 5 out of 5.         Dorsiflexion is 4-5 on right 5 out of 5 on left       Plantarflexion is 5/5 bilaterally       Hip Flexion 5/5 bilaterally.         The patient´s gait is wide-based and spastic    Romberg not tested secondary to instability    Neurologic:          The patient is alert and oriented by 3.          Pupils are equal and reactive to light.         Visual fields are full.         Extraocular movements are intact without nystagmus.         There is no evidence of central motor drift. No facial droop.  No difficulty with rapid alternating movements.         Sensation is equal bilaterally with no deficit.           Reflexes:  3/4+ through out  Jamey's positive bilaterally  Clonus bilaterally    CRANIAL NERVES:         Deferred  secondary to Covid mask    Results Review:   I reviewed the patient's new imaging results and agree with the interpretation.  Discussed with Dr. Sheriff.  Patient has severe/critical canal stenosis with cord impingement and myelomalacia at C4-5 she has severe stenosis at C3-4 and moderate to severe at C 5 6.  Patient has congenitally narrow canal as well with chronic myelomalacia mixed with acute STIR signal change    Assessment/Plan     Spinal cord injury  Progressive myelopathy  Severely inhibited gait    Patient sent to the ER secondary to spinal cord injury and progressive gait dysfunction and being a terrible fall risk.  Patient is very tight in her cervical canal with RAD myelomalacia in her cord and she understands that some of these symptoms she is already having may not improve after surgery.  Patient may need rehabilitation following the procedure.    We have requested that the hospitalist admit the patient and do a general medical clearance work-up.     I discussed the patients findings and my recommendations with patient, family and consulting provider    Nahid Estrella PA-C  06/10/21  16:33 EDT    Time: 60 minutes            Electronically signed by Nahid Estrella PA-C at 06/10/21 2872

## 2021-06-11 NOTE — PROGRESS NOTES
[facility] Medicine Services  PROGRESS NOTE    Patient Name: Diana Alfaro  : 1965  MRN: [id number]    Date of Admission: 6/10/2021  Primary Care Physician: Anusha Licea MD    Subjective   Subjective     CC:  F/u weakness    HPI:  Patient has recently returned to the floor from pacu. She is currently complaining of nausea.     ROS:  Gen- No fevers, chills  CV- No chest pain, palpitations  Resp- No cough, dyspnea  GI- + N No V/D, abd pain        Objective   Objective     Vital Signs:   Temp:  [97.9 °F (36.6 °C)-98.6 °F (37 °C)] 98 °F (36.7 °C)  Heart Rate:  [54-81] 54  Resp:  [16-18] 18  BP: (110-146)/(57-92) 118/57        Physical Exam:  Constitutional: No acute distress, female sitting up in bed   HENT: NCAT, mucous membranes moist  Respiratory: Clear to auscultation bilaterally, respiratory effort normal   Cardiovascular: RRR, no murmurs, rubs, or gallops  Gastrointestinal: Positive bowel sounds, soft, nontender, nondistended  Musculoskeletal: No bilateral ankle edema  Psychiatric:  cooperative  Neurologic: Oriented x 3, speech clear  Skin: No rashes      Results Reviewed:  Results from last 7 days   Lab Units 06/10/21  1704   WBC 10*3/mm3 7.36   HEMOGLOBIN g/dL 13.5   HEMATOCRIT % 41.4   PLATELETS 10*3/mm3 259     Results from last 7 days   Lab Units 06/10/21  1704   SODIUM mmol/L 142   POTASSIUM mmol/L 3.8   CHLORIDE mmol/L 105   CO2 mmol/L 28.0   BUN mg/dL 13   CREATININE mg/dL 0.71   GLUCOSE mg/dL 94   CALCIUM mg/dL 9.8     Estimated Creatinine Clearance: 74 mL/min (by C-G formula based on SCr of 0.71 mg/dL).    Microbiology Results Abnormal     Procedure Component Value - Date/Time    COVID-19 and FLU A/B PCR - Swab, Nasopharynx [[id number]]  (Normal) Collected: 06/10/21 180    Lab Status: Final result Specimen: Swab from Nasopharynx Updated: 06/10/21 1843     COVID19 Not Detected     Influenza A PCR Not Detected     Influenza B PCR Not Detected    Narrative:       Fact sheet for providers: https://www.fda.gov/media/250766/download    Fact sheet for patients: https://www.fda.gov/media/899721/download    Test performed by PCR.          Imaging Results (Last 24 Hours)     Procedure Component Value Units Date/Time    FL C Arm During Surgery [[id number]] Collected: 06/11/21 1211     Updated: 06/11/21 1404    Narrative:      EXAMINATION: FL C ARM DURING SURGERY- 06/11/2021     INDICATION: Cervical Lami; M47.12-Other spondylosis with myelopathy,  cervical region; G95.9-Disease of spinal cord, unspecified     COMPARISON: NONE     FINDINGS: Dictation is to record 5 seconds of fluoroscopy time. Three  intraprocedural images obtained show metal instrument external to the  patient at the approximate axial level of the C2-3 disc space,  subsequent placement of soft tissue retractors at C3-4, and middle clamp  dorsal to C3-4. Please see the procedure report for full details.       Impression:      Fluoroscopy provided during cervical laminectomy.     D:  06/11/2021  E:  06/11/2021                   Results for orders placed during the hospital encounter of 12/22/20    Adult Stress Echo W/ Cont or Stress Agent if Necessary Per Protocol    Interpretation Summary  · Stress Procedure:  · Pharmacological stress test was performed using dobutamine without low-level exercise.  · Patient denied any chest discomfort during infusion  · There was no ST segment deviation noted during stress.  · There were no significant arrhythmias noted during stress  · No ECG evidence of myocardial ischemia  · Findings consistent with a normal ECG stress test.  · Echocardiogram Findings:  · Normal left ventricular cavity size and wall thickness noted. All left ventricular wall segments contract normally.  · Left ventricular ejection fraction appears to be 61 - 65%.  · The aortic valve is not well visualized. No aortic valve regurgitation or stenosis is present. The aortic valve is grossly normal in structure.  · The  mitral valve is structurally normal with no regurgitation or significant stenosis present.  · There is no evidence of pericardial effusion. .  · Stress Echo Findings:  · Segment augmentation had a normal response to stress  · Cavity size behaved normally in response to stress. Left ventricular function is normal. Septal wall motion is normal.  · Normal stress echo with no significant echocardiographic evidence for myocardial ischemia.      I have reviewed the medications:  Scheduled Meds:atorvastatin, 40 mg, Oral, Nightly  fluticasone, 2 spray, Nasal, Daily  ketotifen, 1 drop, Both Eyes, BID  losartan, 50 mg, Oral, BID  sodium chloride, 3 mL, Intravenous, Q12H      Continuous Infusions:lactated ringers, 9 mL/hr  lactated ringers, 9 mL/hr, Last Rate: 9 mL/hr (06/11/21 0842)      PRN Meds:.•  acetaminophen  •  HYDROcodone-acetaminophen  •  HYDROmorphone **AND** naloxone  •  ondansetron  •  [COMPLETED] Insert peripheral IV **AND** sodium chloride  •  sodium chloride  •  temazepam    Assessment/Plan   Assessment & Plan     Active Hospital Problems    Diagnosis  POA   • **Cervical spondylosis with myelopathy [M47.12]  Unknown   • Essential hypertension [I10]  Yes   • Hyperlipidemia [E78.5]  Yes      Resolved Hospital Problems   No resolved problems to display.        Brief Hospital Course to date:  Diana Alfaro is a 55 y.o. female with PMH of HTN, HLD, that presented with progressive weakness in lower and upper extremities. 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     severe cord compression at C3-4, 4 5, 5 6  -s/p laminectomy by Dr Sheriff  -prn pain meds    HTN  -losartan  HLD  -statin     DVT prophylaxis:  Mechanical DVT prophylaxis orders are present.       Disposition: I expect the patient to be discharged TBD     CODE STATUS:   Code Status and Medical Interventions:   Ordered at:  06/10/21 2040     Code Status:    CPR     Medical Interventions (Level of Support Prior to Arrest):    Full       Eliza Escobedo,   06/11/21

## 2021-06-11 NOTE — OP NOTE
NEUROSURGICAL OPERATIVE NOTE        PREOPERATIVE DIAGNOSIS:    Patient with a history of spondylitic cervical myelopathy, cord compression C4-5 predominantly to a lesser extent C3-4 C5-6         POSTOPERATIVE DIAGNOSIS:  Same      PROCEDURE:  Partial inferior laminectomy C3 (25%), C4, C5, laminectomy, superior margin C6 laminectomies (25)%,      SURGEON:  Nacho Sheriff M.D.      ASSISTANT: ALISIA Day was responsible for performing the following activities: Suturing and their skilled assistance was necessary for the success of this case.    ANESTHESIA:  General      ESTIMATED BLOOD LOSS:  Minimal      SPECIMEN: None    DRAINS:  None      COMPLICATIONS:  None apparent          PROCEDURE IN DETAIL:  This unfortunate lady with terrible spondylitic myelopathy who presented admitted are secondary to worsening gait and station and she elected to undergo surgery for severe cord compression    She was taken the operating room given an antibiotic regimen and intubated she was placed in [place] three-point head fixation and rolled prone in neutral position with the head slightly flexed    She was prepped and draped in the usual sterile manner local infiltration lidocaine and epinephrine was performed in the skin.  At this point time fluoroscopic guidance identified the correct levels.  Incision was made dissection was carried down isolated the C3-C4-C5 and cephalad portion C6 lamina.  Complete laminectomy was performed at the C4-5 area with undercutting the ligament and partial inferior C3 laminectomy and cephalad portions of C6 to undercut the ligament that was buckling and compressing the dorsal portion of the cord.  Approximately 25% of those lamina were removed in the cephalad and inferior margins.  At the resolution of the case meticulous hemostasis was maintained the paraspinal musculature was closed in layers as well as the skin I was present until the skin and wound closure.  Fluoroscopic  guidance identified the correct levels of decompression        Nacho LOTTNS

## 2021-06-11 NOTE — PLAN OF CARE
Goal Outcome Evaluation:  Plan of Care Reviewed With: patient        Progress: no change  Outcome Summary: Pt VSS. A&O x4. NPO after midnight. Surgery today @1030. Up to bathroom with one and gait belt. Pt does need her orthotic shoes to walk, she does well with them on. No complaints of pain thus far. Slept on and off. Will continue to monitor.

## 2021-06-11 NOTE — ANESTHESIA PROCEDURE NOTES
Airway  Urgency: elective    Date/Time: 6/11/2021 10:26 AM  Airway not difficult    General Information and Staff    Patient location during procedure: OR  CRNA: Say Elizabeth CRNA    Indications and Patient Condition  Indications for airway management: airway protection    Preoxygenated: yes  MILS maintained throughout  Mask difficulty assessment: 1 - vent by mask    Final Airway Details  Final airway type: endotracheal airway      Successful airway: ETT  Cuffed: yes   Successful intubation technique: video laryngoscopy  Facilitating devices/methods: intubating stylet  Endotracheal tube insertion site: oral  Blade: Martinez  Blade size: 3  ETT size (mm): 7.0  Cormack-Lehane Classification: grade I - full view of glottis  Placement verified by: chest auscultation and capnometry   Cuff volume (mL): 5  Measured from: lips  ETT/EBT  to lips (cm): 21  Number of attempts at approach: 1  Assessment: lips, teeth, and gum same as pre-op and atraumatic intubation    Additional Comments  Negative epigastric sounds, Breath sound equal bilaterally with symmetric chest rise and fall

## 2021-06-11 NOTE — ANESTHESIA POSTPROCEDURE EVALUATION
Patient: Diana Alfaro    Procedure Summary     Date: 06/11/21 Room / Location:  [facility]  [facility]    Anesthesia Start: 1020 Anesthesia Stop:     Procedure: CERVICAL LAMINECTOMY DECOMPRESSION POSTERIOR C3-5 (Bilateral Spine Cervical) Diagnosis:       Cervical spondylosis with myelopathy      (Cervical spondylosis with myelopathy [M47.12])    Surgeons: Nacho Sheriff MD Provider: Deon Mcleod MD    Anesthesia Type: general ASA Status: 2          Anesthesia Type: general    Vitals  No vitals data found for the desired time range.          Post Anesthesia Care and Evaluation    Patient location during evaluation: PACU  Patient participation: complete - patient participated  Level of consciousness: awake  Pain score: 0  Pain management: adequate  Airway patency: patent  Anesthetic complications: No anesthetic complications  PONV Status: none  Cardiovascular status: acceptable and stable  Respiratory status: nasal cannula, unassisted, acceptable and spontaneous ventilation  Hydration status: acceptable

## 2021-06-12 LAB
ANION GAP SERPL CALCULATED.3IONS-SCNC: 11 MMOL/L (ref 5–15)
BASOPHILS # BLD AUTO: 0.02 10*3/MM3 (ref 0–0.2)
BASOPHILS NFR BLD AUTO: 0.1 % (ref 0–1.5)
BUN SERPL-MCNC: 10 MG/DL (ref 6–20)
BUN/CREAT SERPL: 16.4 (ref 7–25)
CALCIUM SPEC-SCNC: 9.2 MG/DL (ref 8.6–10.5)
CHLORIDE SERPL-SCNC: 103 MMOL/L (ref 98–107)
CO2 SERPL-SCNC: 22 MMOL/L (ref 22–29)
CREAT SERPL-MCNC: 0.61 MG/DL (ref 0.57–1)
DEPRECATED RDW RBC AUTO: 39 FL (ref 37–54)
EOSINOPHIL # BLD AUTO: 0 10*3/MM3 (ref 0–0.4)
EOSINOPHIL NFR BLD AUTO: 0 % (ref 0.3–6.2)
ERYTHROCYTE [DISTWIDTH] IN BLOOD BY AUTOMATED COUNT: 12.1 % (ref 12.3–15.4)
GFR SERPL CREATININE-BSD FRML MDRD: 101 ML/MIN/1.73
GLUCOSE SERPL-MCNC: 103 MG/DL (ref 65–99)
HCT VFR BLD AUTO: 39.5 % (ref 34–46.6)
HGB BLD-MCNC: 12.7 G/DL (ref 12–15.9)
IMM GRANULOCYTES # BLD AUTO: 0.13 10*3/MM3 (ref 0–0.05)
IMM GRANULOCYTES NFR BLD AUTO: 0.7 % (ref 0–0.5)
LYMPHOCYTES # BLD AUTO: 1.35 10*3/MM3 (ref 0.7–3.1)
LYMPHOCYTES NFR BLD AUTO: 7.2 % (ref 19.6–45.3)
MCH RBC QN AUTO: 28.3 PG (ref 26.6–33)
MCHC RBC AUTO-ENTMCNC: 32.2 G/DL (ref 31.5–35.7)
MCV RBC AUTO: 88 FL (ref 79–97)
MONOCYTES # BLD AUTO: 1.17 10*3/MM3 (ref 0.1–0.9)
MONOCYTES NFR BLD AUTO: 6.2 % (ref 5–12)
NEUTROPHILS NFR BLD AUTO: 16.16 10*3/MM3 (ref 1.7–7)
NEUTROPHILS NFR BLD AUTO: 85.8 % (ref 42.7–76)
NRBC BLD AUTO-RTO: 0 /100 WBC (ref 0–0.2)
PLATELET # BLD AUTO: 242 10*3/MM3 (ref 140–450)
PMV BLD AUTO: 11.6 FL (ref 6–12)
POTASSIUM SERPL-SCNC: 4.3 MMOL/L (ref 3.5–5.2)
RBC # BLD AUTO: 4.49 10*6/MM3 (ref 3.77–5.28)
SODIUM SERPL-SCNC: 136 MMOL/L (ref 136–145)
WBC # BLD AUTO: 18.83 10*3/MM3 (ref 3.4–10.8)

## 2021-06-12 PROCEDURE — 99024 POSTOP FOLLOW-UP VISIT: CPT | Performed by: PHYSICIAN ASSISTANT

## 2021-06-12 PROCEDURE — 97110 THERAPEUTIC EXERCISES: CPT

## 2021-06-12 PROCEDURE — 97116 GAIT TRAINING THERAPY: CPT

## 2021-06-12 PROCEDURE — 97162 PT EVAL MOD COMPLEX 30 MIN: CPT

## 2021-06-12 PROCEDURE — 99232 SBSQ HOSP IP/OBS MODERATE 35: CPT | Performed by: FAMILY MEDICINE

## 2021-06-12 PROCEDURE — 80048 BASIC METABOLIC PNL TOTAL CA: CPT | Performed by: FAMILY MEDICINE

## 2021-06-12 PROCEDURE — 85025 COMPLETE CBC W/AUTO DIFF WBC: CPT | Performed by: FAMILY MEDICINE

## 2021-06-12 RX ORDER — POLYETHYLENE GLYCOL 3350 17 G/17G
17 POWDER, FOR SOLUTION ORAL DAILY
Status: DISCONTINUED | OUTPATIENT
Start: 2021-06-12 | End: 2021-06-13 | Stop reason: HOSPADM

## 2021-06-12 RX ORDER — AMOXICILLIN 250 MG
2 CAPSULE ORAL 2 TIMES DAILY
Status: DISCONTINUED | OUTPATIENT
Start: 2021-06-12 | End: 2021-06-13 | Stop reason: HOSPADM

## 2021-06-12 RX ADMIN — LOSARTAN POTASSIUM 50 MG: 50 TABLET, FILM COATED ORAL at 20:00

## 2021-06-12 RX ADMIN — BACLOFEN 10 MG: 10 TABLET ORAL at 20:00

## 2021-06-12 RX ADMIN — ATORVASTATIN CALCIUM 40 MG: 40 TABLET, FILM COATED ORAL at 20:00

## 2021-06-12 RX ADMIN — POLYETHYLENE GLYCOL 3350 17 G: 17 POWDER, FOR SOLUTION ORAL at 13:14

## 2021-06-12 RX ADMIN — KETOTIFEN FUMARATE 1 DROP: 0.35 SOLUTION/ DROPS OPHTHALMIC at 20:02

## 2021-06-12 RX ADMIN — SODIUM CHLORIDE, PRESERVATIVE FREE 3 ML: 5 INJECTION INTRAVENOUS at 20:03

## 2021-06-12 RX ADMIN — FLUTICASONE PROPIONATE 2 SPRAY: 50 SPRAY, METERED NASAL at 08:23

## 2021-06-12 RX ADMIN — DOCUSATE SODIUM 50MG AND SENNOSIDES 8.6MG 2 TABLET: 8.6; 5 TABLET, FILM COATED ORAL at 20:00

## 2021-06-12 RX ADMIN — DOCUSATE SODIUM 50MG AND SENNOSIDES 8.6MG 2 TABLET: 8.6; 5 TABLET, FILM COATED ORAL at 13:14

## 2021-06-12 RX ADMIN — BACLOFEN 10 MG: 10 TABLET ORAL at 13:14

## 2021-06-12 RX ADMIN — KETOTIFEN FUMARATE 1 DROP: 0.35 SOLUTION/ DROPS OPHTHALMIC at 08:23

## 2021-06-12 RX ADMIN — BACLOFEN 10 MG: 10 TABLET ORAL at 05:29

## 2021-06-12 RX ADMIN — ACETAMINOPHEN 650 MG: 325 TABLET, FILM COATED ORAL at 23:13

## 2021-06-12 RX ADMIN — LOSARTAN POTASSIUM 50 MG: 50 TABLET, FILM COATED ORAL at 08:23

## 2021-06-12 NOTE — CASE MANAGEMENT/SOCIAL WORK
Continued Stay Note  [facility]     Patient Name: Diana Alfaro  MRN: [id number]  Today's Date: 6/12/2021    Admit Date: 6/10/2021    Discharge Plan     Row Name 06/12/21 0902       Plan    Plan  TBD    Patient/Family in Agreement with Plan  yes    Plan Comments  Received referral for rehab placement assessment.  Ms. Alfaro is pending PT/OT evaluation.  CM will follow for therapy recommendations.    Ms. Alfaro has [employer] Medicaid insurance and only rehab benefit is for acute rehab. such as [facility].  A prior auth will be required for the rehab admission from [facility].    CM will continue to follow and will make referrals after therapy evaluation.    Final Discharge Disposition Code  62 - inpatient rehab facility        Discharge Codes    No documentation.             Floridalma Marmolejo RN

## 2021-06-12 NOTE — THERAPY EVALUATION
Patient Name: Diana Alfaro  : 1965    MRN: [id number]                              Today's Date: 2021       Admit Date: 6/10/2021    Visit Dx:     ICD-10-CM ICD-9-CM   1. Cervical spondylosis with myelopathy  M47.12 721.1   2. Cervical myelopathy (CMS/HCC)  G95.9 721.1     Patient Active Problem List   Diagnosis   • Essential hypertension   • Hyperlipidemia   • Precordial pain   • Drug therapy changed   • Chest pain   • Cervical spondylosis with myelopathy     Past Medical History:   Diagnosis Date   • Arthritis    • Chest pain    • Club foot    • Deviated septum    • Elevated cholesterol    • Environmental allergies    • Gout    • Hyperlipidemia    • Hypertension    • Sinusitis      Past Surgical History:   Procedure Laterality Date   • CHOLECYSTECTOMY     • FOOT SURGERY      s/p clubfoot, R   • SEPTOPLASTY, RESECTION INFERIOR TURBINATES Bilateral 2018    Procedure: SEPTOPLASTY ONLY;  Surgeon: Lorenzo Ordoñez MD;  Location: [facility];  Service: ENT     General Information     Row Name 2115          Physical Therapy Time and Intention    Document Type  evaluation  -LR     Mode of Treatment  physical therapy;individual therapy  -LR     Row Name 21 0815          General Information    Patient Profile Reviewed  yes  -LR     Prior Level of Function  min assist:;all household mobility;community mobility;gait;transfer;bed mobility;ADL's all mobility limited by weakness and impaired balance  -LR     Existing Precautions/Restrictions  fall;spinal;other (see comments) cervical precautions, cervical myelopathy, wear shoes for mobility  -LR     Barriers to Rehab  medically complex;previous functional deficit  -LR     Row Name 2115          Living Environment    Lives With  alone family can assist at all times if needed  -LR     Row Name 21 0815          Home Main Entrance    Number of Stairs, Main Entrance  five  -LR     Stair Railings, Main Entrance  railings on both sides of  stairs  -LR     Row Name 06/12/21 0815          Stairs Within Home, Primary    Number of Stairs, Within Home, Primary  none  -LR     Row Name 06/12/21 0815          Cognition    Orientation Status (Cognition)  oriented x 4  -LR     Row Name 06/12/21 0815          Safety Issues, Functional Mobility    Safety Issues Affecting Function (Mobility)  at risk behavior observed;awareness of need for assistance;insight into deficits/self-awareness;judgment;positioning of assistive device;sequencing abilities;safety precautions follow-through/compliance;safety precaution awareness  -LR     Impairments Affecting Function (Mobility)  balance;strength;muscle tone abnormal;pain;coordination;endurance/activity tolerance;postural/trunk control;range of motion (ROM);motor control;motor planning  -LR       User Key  (r) = Recorded By, (t) = Taken By, (c) = Cosigned By    Initials Name Provider Type    Jaja Mcdonald, PT Physical Therapist        Mobility     Row Name 06/12/21 0815          Bed Mobility    Bed Mobility  supine-sit  -LR     Supine-Sit [place] (Bed Mobility)  verbal cues;contact guard  -LR     Assistive Device (Bed Mobility)  head of bed elevated;bed rails  -LR     Comment (Bed Mobility)  Verbal cues to flex knees and to roll hips and shoulders at same time onto L side. Cues to then drop LEs off EOB and to push up into sitting. Required repeated cues for correct log rolling technique. Patient still partially sat into long sitting during transition to EOB instead of fully rolling onto L side. Denied dizziness upon sitting up.  -LR     Row Name 06/12/21 0815          Transfers    Comment (Transfers)  Verbal cues to push up from bed to stand and to reach back for chair to lower into sitting. Verbal cues to limit trunk flexion during t/f. Assisted to and from bathroom and to sink for hand hygiene.  -LR     Row Name 06/12/21 0815          Bed-Chair Transfer    Bed-Chair [place] (Transfers)  not tested   -LR     Row Name 06/12/21 0815          Sit-Stand Transfer    Sit-Stand [place] (Transfers)  verbal cues;contact guard  -LR     Assistive Device (Sit-Stand Transfers)  walker, front-wheeled  -LR     Row Name 06/12/21 0815          Gait/Stairs (Locomotion)    [place] Level (Gait)  verbal cues;minimum assist (75% patient effort)  -LR     Assistive Device (Gait)  walker, front-wheeled  -LR     Distance in Feet (Gait)  300  -LR     Deviations/Abnormal Patterns (Gait)  bilateral deviations;codie decreased;gait speed decreased;stride length decreased;ataxic  -LR     Bilateral Gait Deviations  forward flexed posture;heel strike decreased;knee buckling, bilateral  -LR     [place] Level (Stairs)  not tested  -LR     Comment (Gait/Stairs)  Patient ambulated with step through gait pattern at slow pace. Verbal cues for increased LE weight bearing, decreased UE weight bearing, increased step length. Slight improvement with cues for correction. Ataxic bilaterally, occasional knee buckling bilaterally. Gait limited by fatigue and weakness.  -LR       User Key  (r) = Recorded By, (t) = Taken By, (c) = Cosigned By    Initials Name Provider Type    LR Jaja Barr, PT Physical Therapist        Obj/Interventions     Row Name 06/12/21 0815          Range of Motion Comprehensive    General Range of Motion  lower extremity range of motion deficits identified  -LR     Row Name 06/12/21 0815          Strength Comprehensive (MMT)    General Manual Muscle Testing (MMT) Assessment  lower extremity strength deficits identified  -     Row Name 06/12/21 0815          Motor Skills    Therapeutic Exercise  ankle;knee;hip;shoulder;other (see comments) cues for technique; no assist required with LE ther ex, cues for increased control  -LR     Row Name 06/12/21 0815          Shoulder (Therapeutic Exercise)    Shoulder (Therapeutic Exercise)  other (see comments) shoulder shrugs, shoulder rolls, scap squeezes, chicken  wings x10 reps each  -     Row Name 06/12/21 0815          Hip (Therapeutic Exercise)    Hip (Therapeutic Exercise)  strengthening exercise;isometric exercises  -     Hip Isometrics (Therapeutic Exercise)  bilateral;gluteal sets;10 repetitions  -     Hip Strengthening (Therapeutic Exercise)  bilateral;heel slides;marching while seated;aBduction;sitting;10 repetitions  -     Row Name 06/12/21 0815          Knee (Therapeutic Exercise)    Knee (Therapeutic Exercise)  strengthening exercise;isometric exercises  -     Knee Isometrics (Therapeutic Exercise)  quad sets;sitting;10 repetitions  -     Knee Strengthening (Therapeutic Exercise)  bilateral;SLR (straight leg raise);SAQ (short arc quad);LAQ (long arc quad);sitting;10 repetitions  -     Row Name 06/12/21 0815          Ankle (Therapeutic Exercise)    Ankle (Therapeutic Exercise)  AROM (active range of motion)  -     Ankle AROM (Therapeutic Exercise)  bilateral;dorsiflexion;plantarflexion;sitting;10 repetitions limited ROM noted at R ankle  -     Row Name 06/12/21 0815          Balance    Balance Assessment  sitting static balance;standing static balance;sitting dynamic balance;standing dynamic balance  -     Static Sitting Balance  WFL;unsupported;sitting, edge of bed  -     Dynamic Sitting Balance  WFL;unsupported;sitting, edge of bed  -     Static Standing Balance  supported;standing;mild impairment  -     Dynamic Standing Balance  mild impairment;supported;standing  -     Row Name 06/12/21 0815          Sensory Assessment (Somatosensory)    Sensory Assessment (Somatosensory)  sensation intact;other (see comments) denies numbness/tingling; light touch equal and intact upon assessment  -     Row Name 06/12/21 0815          General Lower Extremity Assessment (Range of Motion)    Comment: Lower Extremity ROM  B knee extension AROM impaired 15% d/t tightness, R ankle DF AROM impaired 75% d/t prior sx/injury; B hip AROM WFL, L ankle AROM  WFL  -LR     Row Name 06/12/21 0815          Lower Extremity (Manual Muscle Testing)    Comment, MMT: Lower Extremity  B hip flexors: 3-/5, B knee extensors: 4+/5, B knee flexors: 4-/5, R ankle DFs: 3-/5, L ankle DFs: 4-/5  -LR       User Key  (r) = Recorded By, (t) = Taken By, (c) = Cosigned By    Initials Name Provider Type    LR Jaja Barr, PT Physical Therapist        Goals/Plan     Row Name 06/12/21 0815          Bed Mobility Goal 1 (PT)    Activity/Assistive Device (Bed Mobility Goal 1, PT)  sit to supine/supine to sit  -LR     [place] Level/Cues Needed (Bed Mobility Goal 1, PT)  modified independence  -LR     Time Frame (Bed Mobility Goal 1, PT)  long term goal (LTG);5 days  -LR     Progress/Outcomes (Bed Mobility Goal 1, PT)  goal ongoing  -LR     Row Name 06/12/21 0815          Transfer Goal 1 (PT)    Activity/Assistive Device (Transfer Goal 1, PT)  sit-to-stand/stand-to-sit;walker, rolling  -LR     [place] Level/Cues Needed (Transfer Goal 1, PT)  modified independence  -LR     Time Frame (Transfer Goal 1, PT)  long term goal (LTG);5 days  -LR     Progress/Outcome (Transfer Goal 1, PT)  goal ongoing  -LR     Row Name 06/12/21 0815          Gait Training Goal 1 (PT)    Activity/Assistive Device (Gait Training Goal 1, PT)  gait (walking locomotion);walker, rolling  -LR     [place] Level (Gait Training Goal 1, PT)  modified independence  -LR     Distance (Gait Training Goal 1, PT)  500 feet  -LR     Time Frame (Gait Training Goal 1, PT)  long term goal (LTG);5 days  -LR     Progress/Outcome (Gait Training Goal 1, PT)  goal ongoing  -LR       User Key  (r) = Recorded By, (t) = Taken By, (c) = Cosigned By    Initials Name Provider Type    Jaja Mcdonald, PT Physical Therapist        Clinical Impression     Row Name 06/12/21 0815          Pain    Additional Documentation  Pain Scale: Numbers Pre/Post-Treatment (Group)  -LR     Row Name 06/12/21 0815          Pain Scale:  Numbers Pre/Post-Treatment    Pretreatment Pain Rating  6/10  -LR     Posttreatment Pain Rating  4/10  -LR     Pain Location - Orientation  posterior  -LR     Pain Location  neck  -LR     Pain Intervention(s)  Ambulation/increased activity;Repositioned  -LR     Row Name 06/12/21 0815          Plan of Care Review    Plan of Care Reviewed With  patient  -LR     Progress  improving  -LR     Outcome Summary  Patient ambulated 300 feet with RW, step through gait pattern, and min assist, limited by fatigue, weakness, and pain. Patient ataxic throughout gait with occasional knee buckling bilaterally. Demonstrates moderate weakness in B LEs and impaired motor control in B LEs. Recommend rehab at d/c. Will continue to progress as able.  -LR     Row Name 06/12/21 0815          Therapy Assessment/Plan (PT)    Patient/Family Therapy Goals Statement (PT)  get better  -LR     Rehab Potential (PT)  good, to achieve stated therapy goals  -LR     Criteria for Skilled Interventions Met (PT)  yes;meets criteria;skilled treatment is necessary  -LR     Row Name 06/12/21 0815          Positioning and Restraints    Pre-Treatment Position  in bed  -LR     Post Treatment Position  chair  -LR     In Chair  notified nsg;reclined;sitting;call light within reach;exit alarm on;encouraged to call for assist;legs elevated;compression device;waffle cushion  -LR       User Key  (r) = Recorded By, (t) = Taken By, (c) = Cosigned By    Initials Name Provider Type    LR Jaja Barr, PT Physical Therapist        Outcome Measures     Row Name 06/12/21 0815          How much help from another person do you currently need...    Turning from your back to your side while in flat bed without using bedrails?  3  -LR     Moving from lying on back to sitting on the side of a flat bed without bedrails?  3  -LR     Moving to and from a bed to a chair (including a wheelchair)?  3  -LR     Standing up from a chair using your arms (e.g., wheelchair, bedside  chair)?  3  -LR     Climbing 3-5 steps with a railing?  3  -LR     To walk in hospital room?  3  -LR     AM-PAC 6 Clicks Score (PT)  18  -LR     Row Name 06/12/21 0815          Functional Assessment    Outcome Measure Options  AM-PAC 6 Clicks Basic Mobility (PT)  -LR       User Key  (r) = Recorded By, (t) = Taken By, (c) = Cosigned By    Initials Name Provider Type    LR Jaja Barr, PT Physical Therapist        Physical Therapy Education                 Title: PT OT SLP Therapies (Done)     Topic: Physical Therapy (Done)     Point: Mobility training (Done)     Learning Progress Summary           Patient Acceptance, E,D,H, VU,NR by LR at 6/12/2021 0815    Comment: Issued and reviewed written/illustrated cervical HEP and precautions. Educated on correct log rolling technique, correct sit<->stand t/f technique, correct gait mechanics, safety with mobility, and progression of POC.                   Point: Home exercise program (Done)     Learning Progress Summary           Patient Acceptance, E,D,H, VU,NR by LR at 6/12/2021 0815    Comment: Issued and reviewed written/illustrated cervical HEP and precautions. Educated on correct log rolling technique, correct sit<->stand t/f technique, correct gait mechanics, safety with mobility, and progression of POC.                   Point: Body mechanics (Done)     Learning Progress Summary           Patient Acceptance, E,D,H, VU,NR by LR at 6/12/2021 0815    Comment: Issued and reviewed written/illustrated cervical HEP and precautions. Educated on correct log rolling technique, correct sit<->stand t/f technique, correct gait mechanics, safety with mobility, and progression of POC.                   Point: Precautions (Done)     Learning Progress Summary           Patient Acceptance, E,D,H, VU,NR by LR at 6/12/2021 0815    Comment: Issued and reviewed written/illustrated cervical HEP and precautions. Educated on correct log rolling technique, correct sit<->stand t/f  technique, correct gait mechanics, safety with mobility, and progression of POC.                               User Key     Initials Effective Dates Name Provider Type Discipline    LR 06/19/15 -  Jaja Barr, PT Physical Therapist PT              PT Recommendation and Plan  Planned Therapy Interventions (PT): balance training, bed mobility training, gait training, home exercise program, patient/family education, ROM (range of motion), strengthening, transfer training  Plan of Care Reviewed With: patient  Progress: improving  Outcome Summary: Patient ambulated 300 feet with RW, step through gait pattern, and min assist, limited by fatigue, weakness, and pain. Patient ataxic throughout gait with occasional knee buckling bilaterally. Demonstrates moderate weakness in B LEs and impaired motor control in B LEs. Recommend rehab at d/c. Will continue to progress as able.     Time Calculation:   PT Charges     Row Name 06/12/21 0815             Time Calculation    Start Time  0815  -LR      PT Received On  06/12/21  -LR      PT Goal Re-Cert Due Date  06/22/21  -LR         Timed Charges    72270 - PT Therapeutic Exercise Minutes  13  -LR      91628 - Gait Training Minutes   10  -LR         Untimed Charges    PT Eval/Re-eval Minutes  47  -LR         Total Minutes    Timed Charges Total Minutes  23  -LR      Untimed Charges Total Minutes  47  -LR       Total Minutes  70  -LR        User Key  (r) = Recorded By, (t) = Taken By, (c) = Cosigned By    Initials Name Provider Type    LR Jaja Barr, PT Physical Therapist        Therapy Charges for Today     Code Description Service Date Service Provider Modifiers Qty    76803797233 HC PT THER PROC EA 15 MIN 6/12/2021 Jaja Barr, PT GP 1    99691204511 HC GAIT TRAINING EA 15 MIN 6/12/2021 Jaja Barr, PT GP 1    51376611799 HC PT EVAL MOD COMPLEXITY 4 6/12/2021 Jaja Barr, PT GP 1          PT G-Codes  Outcome Measure Options:  AM-PAC 6 Clicks Basic Mobility (PT)  -[facility] 6 Clicks Score (PT): 18    Jaja Barr, PT  6/12/2021

## 2021-06-12 NOTE — PLAN OF CARE
Problem: Adult Inpatient Plan of Care  Goal: Plan of Care Review  Recent Flowsheet Documentation  Taken 6/12/2021 0815 by Jaja Barr, PT  Progress: improving  Plan of Care Reviewed With: patient  Outcome Summary: Patient ambulated 300 feet with RW, step through gait pattern, and min assist, limited by fatigue, weakness, and pain. Patient ataxic throughout gait with occasional knee buckling bilaterally. Demonstrates moderate weakness in B LEs and impaired motor control in B LEs. Recommend rehab at d/c. Will continue to progress as able.   Goal Outcome Evaluation:  Plan of Care Reviewed With: patient        Progress: improving  Outcome Summary: Patient ambulated 300 feet with RW, step through gait pattern, and min assist, limited by fatigue, weakness, and pain. Patient ataxic throughout gait with occasional knee buckling bilaterally. Demonstrates moderate weakness in B LEs and impaired motor control in B LEs. Recommend rehab at d/c. Will continue to progress as able.

## 2021-06-12 NOTE — PLAN OF CARE
Goal Outcome Evaluation:           Progress: improving   VSS, on RA. Pain controlled with PO meds. Voiding well. Dressing has moderate amount of drainage. Slept somewhat.

## 2021-06-12 NOTE — PROGRESS NOTES
[facility]    Subjective   Diana Alfaro 1965 55 y.o. female     06/12/21    Chief complaint: Pain and sensory have improved    HPI  1 Day Post-Op improved postoperatively.  She is getting up and going to the bathroom with assistance.  She is taking p.o. without any nausea and vomiting      Scheduled Meds:atorvastatin, 40 mg, Oral, Nightly  baclofen, 10 mg, Oral, Q8H  fluticasone, 2 spray, Nasal, Daily  ketotifen, 1 drop, Both Eyes, BID  losartan, 50 mg, Oral, BID  sodium chloride, 3 mL, Intravenous, Q12H        PRN Meds:.•  acetaminophen  •  HYDROcodone-acetaminophen  •  HYDROmorphone **AND** naloxone  •  metoclopramide  •  ondansetron  •  [COMPLETED] Insert peripheral IV **AND** sodium chloride  •  sodium chloride  •  temazepam    Intake/Output                       06/11/21 0701 - 06/12/21 0700 06/12/21 0701 - 06/13/21 0700                      [id number] [id number] Total [id number] [id number] Total                 Intake    I.V.  1000  -- 1000  --  -- --    Total Intake 1000 -- 1000 -- -- --       Output    Urine  --  2650 2650  500  -- 500    Total Output -- 2650 2650 500 -- 500          Imaging Results (Last 24 Hours)     Procedure Component Value Units Date/Time    FL C Arm During Surgery [[id number]] Collected: 06/11/21 1211     Updated: 06/11/21 2344    Narrative:      EXAMINATION: FL C ARM DURING SURGERY- 06/11/2021     INDICATION: Cervical Lami; M47.12-Other spondylosis with myelopathy,  cervical region; G95.9-Disease of spinal cord, unspecified     COMPARISON: NONE     FINDINGS: Dictation is to record 5 seconds of fluoroscopy time. Three  intraprocedural images obtained show metal instrument external to the  patient at the approximate axial level of the C2-3 disc space,  subsequent placement of soft tissue retractors at C3-4, and metal clamp  dorsal to C3-4. Please see the procedure report for full details.       Impression:      Fluoroscopy  provided during cervical laminectomy.     D:  06/11/2021  E:  06/11/2021        This report was finalized on 6/11/2021 11:41 PM by Dr. Carlos Rodríguez MD.             Patient Active Problem List    Diagnosis    • *Cervical spondylosis with myelopathy [M47.12]    • Chest pain [R07.9]    • Drug therapy changed [Z79.899]    • Precordial pain [R07.2]    • Essential hypertension [I10]    • Hyperlipidemia [E78.5]         Neurologic Exam  Good  strength bilaterally, upper extremity strength intact.  Leg strength improved.  The numbness in her leg is much improved actually resolved at this time.  Yesterday she had some tingling in her fingertips but does not notice it at this morning at this time.  She is voiding and getting assistance to get up to the bathroom.    Assessment/Plan   1.  Cervical myelopathy, status post C3, C4, C5 laminectomy and the superior margin of C6,  6/11/2021  2.  Osteoarthritis  3.  Club deformity of right foot, she ambulates with special shoes.    PT and OT to evaluate today and tomorrow for plans on going home on Sunday afternoon.  She can go and live with her sister for a week or so however the patient has inquired regarding rehab.    Isabella Ibarra PA-C

## 2021-06-12 NOTE — PROGRESS NOTES
[facility] Medicine Services  PROGRESS NOTE    Patient Name: Diana Alfaro  : 1965  MRN: [id number]    Date of Admission: 6/10/2021  Primary Care Physician: Anusha Licea MD    Subjective   Subjective     CC:  F/u weakness    HPI:  Patient states that her pain is well controlled. She is hopeful to go home soon.     ROS:  Gen- No fevers, chills  CV- No chest pain, palpitations  Resp- No cough, dyspnea  GI- No  N /V/D, abd pain        Objective   Objective     Vital Signs:   Temp:  [97.5 °F (36.4 °C)-98.1 °F (36.7 °C)] 98 °F (36.7 °C)  Heart Rate:  [51-86] 86  Resp:  [16-18] 16  BP: (103-166)/(58-82) 115/59        Physical Exam:  Constitutional: No acute distress, female sitting up in chair   HENT: NCAT, mucous membranes moist  Respiratory: Clear to auscultation bilaterally, respiratory effort normal   Cardiovascular: RRR, no murmurs, rubs, or gallops  Gastrointestinal: Positive bowel sounds, soft, nontender, nondistended  Musculoskeletal: No bilateral ankle edema  Psychiatric:  cooperative  Neurologic: Oriented x 3, speech clear  Skin: No rashes, neck without erythema       Results Reviewed:  Results from last 7 days   Lab Units 21  0658 06/10/21  1704   WBC 10*3/mm3 18.83* 7.36   HEMOGLOBIN g/dL 12.7 13.5   HEMATOCRIT % 39.5 41.4   PLATELETS 10*3/mm3 242 259     Results from last 7 days   Lab Units 21  0658 06/10/21  1704   SODIUM mmol/L 136 142   POTASSIUM mmol/L 4.3 3.8   CHLORIDE mmol/L 103 105   CO2 mmol/L 22.0 28.0   BUN mg/dL 10 13   CREATININE mg/dL 0.61 0.71   GLUCOSE mg/dL 103* 94   CALCIUM mg/dL 9.2 9.8     Estimated Creatinine Clearance: 86.1 mL/min (by C-G formula based on SCr of 0.61 mg/dL).    Microbiology Results Abnormal     Procedure Component Value - Date/Time    COVID-19 and FLU A/B PCR - Swab, Nasopharynx [[id number]]  (Normal) Collected: 06/10/21 3831    Lab Status: Final result Specimen: Swab from Nasopharynx Updated: 06/10/21 5048      COVID19 Not Detected     Influenza A PCR Not Detected     Influenza B PCR Not Detected    Narrative:      Fact sheet for providers: https://www.fda.gov/media/869196/download    Fact sheet for patients: https://www.fda.gov/media/719486/download    Test performed by PCR.          Imaging Results (Last 24 Hours)     Procedure Component Value Units Date/Time    FL C Arm During Surgery [[id number]] Collected: 06/11/21 1211     Updated: 06/11/21 2344    Narrative:      EXAMINATION: FL C ARM DURING SURGERY- 06/11/2021     INDICATION: Cervical Lami; M47.12-Other spondylosis with myelopathy,  cervical region; G95.9-Disease of spinal cord, unspecified     COMPARISON: NONE     FINDINGS: Dictation is to record 5 seconds of fluoroscopy time. Three  intraprocedural images obtained show metal instrument external to the  patient at the approximate axial level of the C2-3 disc space,  subsequent placement of soft tissue retractors at C3-4, and metal clamp  dorsal to C3-4. Please see the procedure report for full details.       Impression:      Fluoroscopy provided during cervical laminectomy.     D:  06/11/2021  E:  06/11/2021        This report was finalized on 6/11/2021 11:41 PM by Dr. Carlos Rodríguez MD.             Results for orders placed during the hospital encounter of 12/22/20    Adult Stress Echo W/ Cont or Stress Agent if Necessary Per Protocol    Interpretation Summary  · Stress Procedure:  · Pharmacological stress test was performed using dobutamine without low-level exercise.  · Patient denied any chest discomfort during infusion  · There was no ST segment deviation noted during stress.  · There were no significant arrhythmias noted during stress  · No ECG evidence of myocardial ischemia  · Findings consistent with a normal ECG stress test.  · Echocardiogram Findings:  · Normal left ventricular cavity size and wall thickness noted. All left ventricular wall segments contract normally.  · Left ventricular ejection fraction  appears to be 61 - 65%.  · The aortic valve is not well visualized. No aortic valve regurgitation or stenosis is present. The aortic valve is grossly normal in structure.  · The mitral valve is structurally normal with no regurgitation or significant stenosis present.  · There is no evidence of pericardial effusion. .  · Stress Echo Findings:  · Segment augmentation had a normal response to stress  · Cavity size behaved normally in response to stress. Left ventricular function is normal. Septal wall motion is normal.  · Normal stress echo with no significant echocardiographic evidence for myocardial ischemia.      I have reviewed the medications:  Scheduled Meds:atorvastatin, 40 mg, Oral, Nightly  baclofen, 10 mg, Oral, Q8H  fluticasone, 2 spray, Nasal, Daily  ketotifen, 1 drop, Both Eyes, BID  losartan, 50 mg, Oral, BID  polyethylene glycol, 17 g, Oral, Daily  senna-docusate sodium, 2 tablet, Oral, BID  sodium chloride, 3 mL, Intravenous, Q12H      Continuous Infusions:lactated ringers, 9 mL/hr  lactated ringers, 9 mL/hr, Last Rate: 9 mL/hr (06/11/21 0842)      PRN Meds:.•  acetaminophen  •  HYDROcodone-acetaminophen  •  HYDROmorphone **AND** naloxone  •  metoclopramide  •  ondansetron  •  [COMPLETED] Insert peripheral IV **AND** sodium chloride  •  sodium chloride  •  temazepam    Assessment/Plan   Assessment & Plan     Active Hospital Problems    Diagnosis  POA   • **Cervical spondylosis with myelopathy [M47.12]  Unknown   • Essential hypertension [I10]  Yes   • Hyperlipidemia [E78.5]  Yes      Resolved Hospital Problems   No resolved problems to display.        Brief Hospital Course to date:  Diana Alfaro is a 55 y.o. female with PMH of HTN, HLD, that presented with progressive weakness in lower and upper extremities. 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     severe cord  compression at C3-4, 4 5, 5 6  -s/p laminectomy by Dr Sheriff 6/11  -prn pain meds    HTN  -losartan  HLD  -statin     DVT prophylaxis:  Mechanical DVT prophylaxis orders are present.       Disposition: I expect the patient to be discharged TBD, possibly tomorrow pending PT eval     CODE STATUS:   Code Status and Medical Interventions:   Ordered at: 06/10/21 2040     Code Status:    CPR     Medical Interventions (Level of Support Prior to Arrest):    Full       Eliza Escobedo, DO  06/12/21

## 2021-06-13 VITALS
BODY MASS INDEX: 28.47 KG/M2 | OXYGEN SATURATION: 95 % | RESPIRATION RATE: 14 BRPM | HEART RATE: 77 BPM | SYSTOLIC BLOOD PRESSURE: 122 MMHG | DIASTOLIC BLOOD PRESSURE: 66 MMHG | TEMPERATURE: 98.1 F | HEIGHT: 60 IN | WEIGHT: 145 LBS

## 2021-06-13 PROBLEM — N31.9 NEUROGENIC BLADDER: Status: ACTIVE | Noted: 2021-06-13

## 2021-06-13 LAB
BASOPHILS # BLD AUTO: 0.03 10*3/MM3 (ref 0–0.2)
BASOPHILS NFR BLD AUTO: 0.3 % (ref 0–1.5)
BILIRUB UR QL STRIP: NEGATIVE
CLARITY UR: CLEAR
COLOR UR: YELLOW
DEPRECATED RDW RBC AUTO: 41.7 FL (ref 37–54)
EOSINOPHIL # BLD AUTO: 0.05 10*3/MM3 (ref 0–0.4)
EOSINOPHIL NFR BLD AUTO: 0.4 % (ref 0.3–6.2)
ERYTHROCYTE [DISTWIDTH] IN BLOOD BY AUTOMATED COUNT: 12.6 % (ref 12.3–15.4)
GLUCOSE UR STRIP-MCNC: NEGATIVE MG/DL
HCT VFR BLD AUTO: 40.4 % (ref 34–46.6)
HGB BLD-MCNC: 12.9 G/DL (ref 12–15.9)
HGB UR QL STRIP.AUTO: NEGATIVE
IMM GRANULOCYTES # BLD AUTO: 0.05 10*3/MM3 (ref 0–0.05)
IMM GRANULOCYTES NFR BLD AUTO: 0.4 % (ref 0–0.5)
KETONES UR QL STRIP: NEGATIVE
LEUKOCYTE ESTERASE UR QL STRIP.AUTO: NEGATIVE
LYMPHOCYTES # BLD AUTO: 1.59 10*3/MM3 (ref 0.7–3.1)
LYMPHOCYTES NFR BLD AUTO: 13.7 % (ref 19.6–45.3)
MCH RBC QN AUTO: 28.7 PG (ref 26.6–33)
MCHC RBC AUTO-ENTMCNC: 31.9 G/DL (ref 31.5–35.7)
MCV RBC AUTO: 90 FL (ref 79–97)
MONOCYTES # BLD AUTO: 0.89 10*3/MM3 (ref 0.1–0.9)
MONOCYTES NFR BLD AUTO: 7.7 % (ref 5–12)
NEUTROPHILS NFR BLD AUTO: 77.5 % (ref 42.7–76)
NEUTROPHILS NFR BLD AUTO: 9 10*3/MM3 (ref 1.7–7)
NITRITE UR QL STRIP: NEGATIVE
NRBC BLD AUTO-RTO: 0 /100 WBC (ref 0–0.2)
PH UR STRIP.AUTO: 6 [PH] (ref 5–8)
PLATELET # BLD AUTO: 223 10*3/MM3 (ref 140–450)
PMV BLD AUTO: 11.1 FL (ref 6–12)
PROT UR QL STRIP: NEGATIVE
RBC # BLD AUTO: 4.49 10*6/MM3 (ref 3.77–5.28)
SP GR UR STRIP: 1.02 (ref 1–1.03)
UROBILINOGEN UR QL STRIP: NORMAL
WBC # BLD AUTO: 11.61 10*3/MM3 (ref 3.4–10.8)

## 2021-06-13 PROCEDURE — 85025 COMPLETE CBC W/AUTO DIFF WBC: CPT | Performed by: NURSE PRACTITIONER

## 2021-06-13 PROCEDURE — 81003 URINALYSIS AUTO W/O SCOPE: CPT | Performed by: NURSE PRACTITIONER

## 2021-06-13 PROCEDURE — 99024 POSTOP FOLLOW-UP VISIT: CPT | Performed by: PHYSICIAN ASSISTANT

## 2021-06-13 PROCEDURE — 99232 SBSQ HOSP IP/OBS MODERATE 35: CPT | Performed by: NURSE PRACTITIONER

## 2021-06-13 PROCEDURE — 97166 OT EVAL MOD COMPLEX 45 MIN: CPT

## 2021-06-13 PROCEDURE — 97110 THERAPEUTIC EXERCISES: CPT

## 2021-06-13 PROCEDURE — 97535 SELF CARE MNGMENT TRAINING: CPT

## 2021-06-13 PROCEDURE — 97116 GAIT TRAINING THERAPY: CPT

## 2021-06-13 RX ORDER — BACLOFEN 10 MG/1
10 TABLET ORAL EVERY 8 HOURS SCHEDULED
Qty: 90 TABLET | Refills: 1 | Status: SHIPPED | OUTPATIENT
Start: 2021-06-13 | End: 2021-10-05

## 2021-06-13 RX ORDER — TRAMADOL HYDROCHLORIDE 50 MG/1
50 TABLET ORAL EVERY 8 HOURS PRN
Qty: 30 TABLET | Refills: 0 | Status: SHIPPED | OUTPATIENT
Start: 2021-06-13 | End: 2021-06-24

## 2021-06-13 RX ADMIN — LOSARTAN POTASSIUM 50 MG: 50 TABLET, FILM COATED ORAL at 09:16

## 2021-06-13 RX ADMIN — DOCUSATE SODIUM 50MG AND SENNOSIDES 8.6MG 2 TABLET: 8.6; 5 TABLET, FILM COATED ORAL at 09:16

## 2021-06-13 RX ADMIN — ACETAMINOPHEN 650 MG: 325 TABLET, FILM COATED ORAL at 09:16

## 2021-06-13 RX ADMIN — FLUTICASONE PROPIONATE 2 SPRAY: 50 SPRAY, METERED NASAL at 09:19

## 2021-06-13 RX ADMIN — BACLOFEN 10 MG: 10 TABLET ORAL at 05:29

## 2021-06-13 RX ADMIN — SODIUM CHLORIDE, PRESERVATIVE FREE 3 ML: 5 INJECTION INTRAVENOUS at 09:16

## 2021-06-13 RX ADMIN — KETOTIFEN FUMARATE 1 DROP: 0.35 SOLUTION/ DROPS OPHTHALMIC at 09:19

## 2021-06-13 RX ADMIN — POLYETHYLENE GLYCOL 3350 17 G: 17 POWDER, FOR SOLUTION ORAL at 09:16

## 2021-06-13 NOTE — PROGRESS NOTES
[facility] Medicine Services  PROGRESS NOTE    Patient Name: Diana Alfaro  : 1965  MRN: [id number]    Date of Admission: 6/10/2021  Primary Care Physician: Anusha Licea MD    Subjective   Subjective     CC:  Follow up weakness     HPI:  Patient seen resting in bed in bed in no apparent distress. No acute events overnight per nursing. Pain is currently well controlled. No new complaints at this time.     ROS:  Gen- No fevers, chills  CV- No chest pain, palpitations  Resp- No cough, dyspnea  GI- No N/V/D, abd pain    Objective   Objective     Vital Signs:   Temp:  [98.1 °F (36.7 °C)] 98.1 °F (36.7 °C)  Heart Rate:  [73-84] 77  Resp:  [14-18] 14  BP: (122-128)/(66-78) 122/66        Physical Exam:  Constitutional: No acute distress, awake, alert  HENT: NCAT, mucous membranes moist  Respiratory: Clear to auscultation bilaterally, respiratory effort normal   Cardiovascular: RRR, no murmurs, cap refill brisk   Gastrointestinal: Positive bowel sounds, soft, nontender, nondistended  Musculoskeletal: No BLE edema   Psychiatric: Appropriate affect, cooperative  Neurologic: Oriented x 3, moves all extremities, speech clear  Skin: warm, dry, no visible rash     Results Reviewed:  Results from last 7 days   Lab Units 21  1132 21  0658 06/10/21  1704   WBC 10*3/mm3 11.61* 18.83* 7.36   HEMOGLOBIN g/dL 12.9 12.7 13.5   HEMATOCRIT % 40.4 39.5 41.4   PLATELETS 10*3/mm3 223 242 259     Results from last 7 days   Lab Units 21  0658 06/10/21  1704   SODIUM mmol/L 136 142   POTASSIUM mmol/L 4.3 3.8   CHLORIDE mmol/L 103 105   CO2 mmol/L 22.0 28.0   BUN mg/dL 10 13   CREATININE mg/dL 0.61 0.71   GLUCOSE mg/dL 103* 94   CALCIUM mg/dL 9.2 9.8     Estimated Creatinine Clearance: 86.1 mL/min (by C-G formula based on SCr of 0.61 mg/dL).    Microbiology Results Abnormal     Procedure Component Value - Date/Time    COVID-19 and FLU A/B PCR - Swab, Nasopharynx [[id number]]  (Normal)  Collected: 06/10/21 1801    Lab Status: Final result Specimen: Swab from Nasopharynx Updated: 06/10/21 1843     COVID19 Not Detected     Influenza A PCR Not Detected     Influenza B PCR Not Detected    Narrative:      Fact sheet for providers: https://www.fda.gov/media/765679/download    Fact sheet for patients: https://www.fda.gov/media/266339/download    Test performed by PCR.          Imaging Results (Last 24 Hours)     ** No results found for the last 24 hours. **          Results for orders placed during the hospital encounter of 12/22/20    Adult Stress Echo W/ Cont or Stress Agent if Necessary Per Protocol    Interpretation Summary  · Stress Procedure:  · Pharmacological stress test was performed using dobutamine without low-level exercise.  · Patient denied any chest discomfort during infusion  · There was no ST segment deviation noted during stress.  · There were no significant arrhythmias noted during stress  · No ECG evidence of myocardial ischemia  · Findings consistent with a normal ECG stress test.  · Echocardiogram Findings:  · Normal left ventricular cavity size and wall thickness noted. All left ventricular wall segments contract normally.  · Left ventricular ejection fraction appears to be 61 - 65%.  · The aortic valve is not well visualized. No aortic valve regurgitation or stenosis is present. The aortic valve is grossly normal in structure.  · The mitral valve is structurally normal with no regurgitation or significant stenosis present.  · There is no evidence of pericardial effusion. .  · Stress Echo Findings:  · Segment augmentation had a normal response to stress  · Cavity size behaved normally in response to stress. Left ventricular function is normal. Septal wall motion is normal.  · Normal stress echo with no significant echocardiographic evidence for myocardial ischemia.      I have reviewed the medications:  Scheduled Meds:atorvastatin, 40 mg, Oral, Nightly  baclofen, 10 mg, Oral,  Q8H  fluticasone, 2 spray, Nasal, Daily  ketotifen, 1 drop, Both Eyes, BID  losartan, 50 mg, Oral, BID  polyethylene glycol, 17 g, Oral, Daily  senna-docusate sodium, 2 tablet, Oral, BID  sodium chloride, 3 mL, Intravenous, Q12H      Continuous Infusions:lactated ringers, 9 mL/hr  lactated ringers, 9 mL/hr, Last Rate: 9 mL/hr (06/11/21 0842)      PRN Meds:.•  acetaminophen  •  HYDROcodone-acetaminophen  •  HYDROmorphone **AND** naloxone  •  metoclopramide  •  ondansetron  •  [COMPLETED] Insert peripheral IV **AND** sodium chloride  •  sodium chloride  •  temazepam    Assessment/Plan   Assessment & Plan     Active Hospital Problems    Diagnosis  POA   • **Cervical spondylosis with myelopathy [M47.12]  Yes   • Neurogenic bladder [N31.9]  Yes   • Club foot [Q66.89]  Not Applicable   • Essential hypertension [I10]  Yes   • Hyperlipidemia [E78.5]  Yes      Resolved Hospital Problems   No resolved problems to display.        Brief Hospital Course to date:  Diana Alfaro is a 55 y.o. female with history of hypertension, hyperlipidemia who has had progressive difficulty walking as well as some progressive weakness and tingling in her fingers.  Patient was seen by neurosurgery clinic and found to have critical cervical stenosis and was recommended to be seen in the emergency room for admission on 6/10/2021. She was found to have progressive spastic myelopathy due to severe cord compression at C3-4, 4-5, 5-6 which is congenitally narrow with chronic myelomalacia with STIR abnormalities. She underwent C3-6 laminectomy with Dr. Sheriff on 6/11/2021. She is currently ambulatory with an antalgic gait and some knee buckling. She has been able to ambulate with physical therapy with a rolling walker. Home physical and occupation therapy have been arranged. She will be discharged home today in stable condition. She will follow up with Neurosurgery in [place] for suture removal.      severe cord compression at C3-4, 4 5, 5 6  -s/p  laminectomy by Dr Sheriff 6/11  -prn pain meds     HTN  -losartan     HLD  -statin     Leukocytosis  -suspect reactive  -WBC up to 18.83 on 6/12, Improved to 11.61 today  -UA unremarkable     DVT prophylaxis:  Mechanical DVT prophylaxis orders are present.       Disposition: I expect the patient to be discharged hopefully later today.    CODE STATUS:   Code Status and Medical Interventions:   Ordered at: 06/10/21 2040     Code Status:    CPR     Medical Interventions (Level of Support Prior to Arrest):    Full       Tino Cortes, APRN  06/13/21

## 2021-06-13 NOTE — PLAN OF CARE
Goal Outcome Evaluation:           Progress: improving   VSS, on RA. Pain controlled with tylenol. Voiding well. Slept well. Dressing is CDI.

## 2021-06-13 NOTE — CASE MANAGEMENT/SOCIAL WORK
Discharge Planning Assessment  [facility]     Patient Name: Diana Alfaro  MRN: [id number]  Today's Date: 6/13/2021    Admit Date: 6/10/2021    Discharge Needs Assessment    No documentation.       Discharge Plan     Row Name 06/13/21 1025       Plan    Plan  Home with sister's assistance, home health for PT and OT, and a rolling walker and bedside commode from [employer]    Plan Comments  Ms. Alfaro is being discharged today.  Folllowed up with Ms. Alfaro at the bedside for discharge planning.  Ms. Alfaro is ambulating with a rolling walker.  She requested a rolling walker and bedside commode for home use and did not have preference for provider.  CM delivered the DME to the hospital room from [employer].    Received referral for home health PT and OT.  Ms. Alfaro did not have preference for home health.  CM will arrange for home health in [facility] tomorrow and will follow up with the patient at home.    Final Discharge Disposition Code  06 - home with home health care        Continued Care and Services - Admitted Since 6/10/2021     Durable Medical Equipment Coordination complete    Service Provider Request Status Selected Services Address Phone Fax Patient Preferred    [email or web address] [facility]   Selected Durable Medical Equipment [address] [place] [id number] [phone] --              Expected Discharge Date and Time     Expected Discharge Date Expected Discharge Time    Jun 13, 2021         Demographic Summary    No documentation.       Functional Status    No documentation.       Psychosocial    No documentation.       Abuse/Neglect    No documentation.       Legal    No documentation.       Substance Abuse    No documentation.       Patient Forms    No documentation.           Floridalma Marmolejo RN

## 2021-06-13 NOTE — THERAPY DISCHARGE NOTE
Patient Name: Diana Alfaro  : 1965    MRN: [id number]                              Today's Date: 2021       Admit Date: 6/10/2021    Visit Dx:     ICD-10-CM ICD-9-CM   1. Cervical spondylosis with myelopathy  M47.12 721.1   2. Cervical myelopathy (CMS/HCC)  G95.9 721.1   3. 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 Neurogenic bladder  N31.9 596.54   5. Mixed hyperlipidemia  E78.2 272.2     Patient Active Problem List   Diagnosis   • Essential hypertension   • Hyperlipidemia   • Precordial pain   • Drug therapy changed   • Chest pain   • Cervical spondylosis with myelopathy   • Neurogenic bladder   • Club foot     Past Medical History:   Diagnosis Date   • Arthritis    • Chest pain    • Club foot    • Deviated septum    • Elevated cholesterol    • Environmental allergies    • Gout    • Hyperlipidemia    • Hypertension    • Sinusitis      Past Surgical History:   Procedure Laterality Date   • CHOLECYSTECTOMY     • FOOT SURGERY      s/p clubfoot, R   • SEPTOPLASTY, RESECTION INFERIOR TURBINATES Bilateral 2018    Procedure: SEPTOPLASTY ONLY;  Surgeon: Lorenzo Ordoñez MD;  Location: [facility];  Service: ENT     General Information     Row Name 21 0929          Physical Therapy Time and Intention    Document Type  therapy note (daily note);discharge treatment;discharge evaluation/summary  -LR     Mode of Treatment  physical therapy;individual therapy  -LR     Row Name 21 0929          General Information    Patient Profile Reviewed  yes  -LR     Existing Precautions/Restrictions  fall;spinal;other (see comments) cervical precautions, cervical myelopathy, R club foot, wear shoes for mobility  -LR     Barriers to Rehab  medically complex;previous functional deficit  -LR     Row Name 2129          Cognition    Orientation Status (Cognition)  oriented x 4  -LR     Row Name 2129          Safety Issues, Functional Mobility    Safety Issues  Affecting Function (Mobility)  ability to follow commands;at risk behavior observed;awareness of need for assistance;insight into deficits/self-awareness;judgment;positioning of assistive device;sequencing abilities;safety precautions follow-through/compliance;safety precaution awareness  -LR     Impairments Affecting Function (Mobility)  strength;balance;pain;coordination;endurance/activity tolerance;postural/trunk control;range of motion (ROM);motor control;motor planning  -LR       User Key  (r) = Recorded By, (t) = Taken By, (c) = Cosigned By    Initials Name Provider Type    LR Jaja Barr, PT Physical Therapist        Mobility     Row Name 06/13/21 0929          Bed Mobility    Supine-Sit [place] (Bed Mobility)  not tested  -LR     Comment (Bed Mobility)  [facility] on arrival and at end of treatment.  -LR     Row Name 06/13/21 0929          Transfers    Comment (Transfers)  Verbal cues to push up from chair to stand instead of pulling up on RW to stand. Cues to reach back for chair to lower into sitting instead of holding on to RW.  -LR     Row Name 06/13/21 0929          Bed-Chair Transfer    Bed-Chair [place] (Transfers)  not tested  -LR     Row Name 06/13/21 0929          Sit-Stand Transfer    Sit-Stand [place] (Transfers)  verbal cues;contact guard  -LR     Assistive Device (Sit-Stand Transfers)  walker, front-wheeled  -LR     Row Name 06/13/21 0929          Gait/Stairs (Locomotion)    [place] Level (Gait)  verbal cues;contact guard  -LR     Assistive Device (Gait)  walker, front-wheeled  -LR     Distance in Feet (Gait)  450  -LR     Deviations/Abnormal Patterns (Gait)  bilateral deviations;codie decreased;gait speed decreased;stride length decreased;ataxic  -LR     Bilateral Gait Deviations  forward flexed posture;heel strike decreased;knee buckling, bilateral  -LR     [place] Level (Stairs)  verbal cues;contact guard  -LR     Handrail Location (Stairs)  both sides  -LR      Number of Steps (Stairs)  5  -LR     Ascending Technique (Stairs)  step-over-step  -LR     Descending Technique (Stairs)  step-to-step  -LR     Comment (Gait/Stairs)  Patient ambulated with step through gait pattern at slow pace. Repeated verbal cues to stay inside of RW, decreased UE weight bearing, and increased LE weight bearing. Slight improvement with cues for correction. Continues to exhibit occasional B knee buckling with gait. Gait limited by weakness and fatigue. Despite verbal cues to take stairs one at a time, patient ascended steps reciprocally with no difficulty. Verbal cues to climb steps one at a time and to step up with strong LE first and down with weak LE first. No knee buckling with stairs.  -LR       User Key  (r) = Recorded By, (t) = Taken By, (c) = Cosigned By    Initials Name Provider Type    Jaja Mcdonald, PT Physical Therapist        Obj/Interventions     Row Name 06/13/21 0929          Motor Skills    Therapeutic Exercise  ankle;knee;hip;shoulder;other (see comments) cue for technique; no assist required, cues for decreased pace and increased control with LE ther ex  -LR     Row Name 06/13/21 0929          Shoulder (Therapeutic Exercise)    Shoulder (Therapeutic Exercise)  other (see comments) shoulder shrugs, shoulder rolls, scap squeezes, and chicken wings x10 reps each  -LR     Row Name 06/13/21 0929          Hip (Therapeutic Exercise)    Hip (Therapeutic Exercise)  strengthening exercise;isometric exercises  -     Hip Isometrics (Therapeutic Exercise)  bilateral;gluteal sets;aDduction;sitting;10 repetitions;5 repetitions hip adduction pillow squeezes  -     Hip Strengthening (Therapeutic Exercise)  bilateral;heel slides;marching while seated;aBduction;sitting;10 repetitions;5 repetitions  -LR     Row Name 06/13/21 0929          Knee (Therapeutic Exercise)    Knee (Therapeutic Exercise)  strengthening exercise;isometric exercises  -     Knee Isometrics (Therapeutic  Exercise)  bilateral;quad sets;sitting;10 repetitions;5 repetitions  -     Knee Strengthening (Therapeutic Exercise)  bilateral;SLR (straight leg raise);SAQ (short arc quad);LAQ (long arc quad);sitting;5 repetitions;10 repetitions  -[facility] Name 06/13/21 0929          Ankle (Therapeutic Exercise)    Ankle (Therapeutic Exercise)  AROM (active range of motion)  -     Ankle AROM (Therapeutic Exercise)  dorsiflexion;plantarflexion;sitting;10 repetitions;5 repetitions;left  -LR     Row Name 06/13/21 0929          Balance    Balance Assessment  sitting static balance;sitting dynamic balance;standing static balance;standing dynamic balance  -     Static Sitting Balance  WFL;unsupported;sitting in chair  -LR     Dynamic Sitting Balance  WFL;unsupported;sitting in chair  -LR     Static Standing Balance  WFL;supported;standing  -LR     Dynamic Standing Balance  mild impairment;supported;standing  -LR       User Key  (r) = Recorded By, (t) = Taken By, (c) = Cosigned By    Initials Name Provider Type    LR Jaja Barr, PT Physical Therapist        Goals/Plan     Row Name 06/13/21 0929          Bed Mobility Goal 1 (PT)    Activity/Assistive Device (Bed Mobility Goal 1, PT)  sit to supine/supine to sit  -LR     [place] Level/Cues Needed (Bed Mobility Goal 1, PT)  modified independence  -LR     Time Frame (Bed Mobility Goal 1, PT)  long term goal (LTG);5 days  -LR     Progress/Outcomes (Bed Mobility Goal 1, PT)  goal not met;discharged from facility  -LR     Row Name 06/13/21 0929          Transfer Goal 1 (PT)    Activity/Assistive Device (Transfer Goal 1, PT)  sit-to-stand/stand-to-sit;walker, rolling  -LR     [place] Level/Cues Needed (Transfer Goal 1, PT)  modified independence  -LR     Time Frame (Transfer Goal 1, PT)  long term goal (LTG);5 days  -LR     Progress/Outcome (Transfer Goal 1, PT)  good progress toward goal;goal not met;discharged from facility  -LR     Row Name 06/13/21 0929           Gait Training Goal 1 (PT)    Activity/Assistive Device (Gait Training Goal 1, PT)  gait (walking locomotion);walker, rolling  -LR     [place] Level (Gait Training Goal 1, PT)  modified independence  -LR     Distance (Gait Training Goal 1, PT)  500 feet  -LR     Time Frame (Gait Training Goal 1, PT)  long term goal (LTG);5 days  -LR     Progress/Outcome (Gait Training Goal 1, PT)  good progress toward goal;goal not met;discharged from facility  -LR     Row Name 06/13/21 0929          Stairs Goal 1 (PT)    Activity/Assistive Device (Stairs Goal 1, PT)  ascending stairs;descending stairs;using handrail, left;using handrail, right;step-to-step  -LR     [place] Level/Cues Needed (Stairs Goal 1, PT)  contact guard assist  -LR     Number of Stairs (Stairs Goal 1, PT)  5  -LR     Time Frame (Stairs Goal 1, PT)  long term goal (LTG);5 days  -LR     Progress/Outcome (Stairs Goal 1, PT)  goal met  -LR       User Key  (r) = Recorded By, (t) = Taken By, (c) = Cosigned By    Initials Name Provider Type    LR Jaja Barr, PT Physical Therapist        Clinical Impression     Row Name 06/13/21 0929          Pain    Additional Documentation  Pain Scale: Numbers Pre/Post-Treatment (Group)  -LR     Row Name 06/13/21 0929          Pain Scale: Numbers Pre/Post-Treatment    Pretreatment Pain Rating  2/10  -LR     Posttreatment Pain Rating  3/10  -LR     Pain Location - Orientation  posterior  -LR     Pain Location  neck  -LR     Pain Intervention(s)  Ambulation/increased activity;Repositioned  -LR     Row Name 06/13/21 0929          Plan of Care Review    Plan of Care Reviewed With  patient  -LR     Progress  improving  -LR     Outcome Summary  Patient increased gait distance to 450 feet with RW and step through gait pattern, limited by fatigue and weakness. Gait continues to be ataxic and continues to exhibit occasional B knee buckling. Patient climbed 5 steps with bilateral handrails with no difficulty. Patient has  been d/c home with family and HHPT.  -LR     Row Name 06/13/21 0929          Therapy Assessment/Plan (PT)    Rehab Potential (PT)  good, to achieve stated therapy goals  -LR     Criteria for Skilled Interventions Met (PT)  yes;meets criteria;skilled treatment is necessary  -LR     Row Name 06/13/21 0929          Positioning and Restraints    Pre-Treatment Position  sitting in chair/recliner  -LR     Post Treatment Position  chair  -LR     In Chair  notified nsg;reclined;sitting;call light within reach;encouraged to call for assist;exit alarm on;legs elevated;waffle cushion  -LR       User Key  (r) = Recorded By, (t) = Taken By, (c) = Cosigned By    Initials Name Provider Type    LR Jaja Barr, PT Physical Therapist        Outcome Measures     Row Name 06/13/21 0929          How much help from another person do you currently need...    Turning from your back to your side while in flat bed without using bedrails?  3  -LR     Moving from lying on back to sitting on the side of a flat bed without bedrails?  3  -LR     Moving to and from a bed to a chair (including a wheelchair)?  3  -LR     Standing up from a chair using your arms (e.g., wheelchair, bedside chair)?  3  -LR     Climbing 3-5 steps with a railing?  3  -LR     To walk in hospital room?  3  -LR     AM-PAC 6 Clicks Score (PT)  18  -LR     Row Name 06/13/21 0950 06/13/21 0929       Functional Assessment    Outcome Measure Options  AM-PAC 6 Clicks Daily Activity (OT)  -TA  AM-PAC 6 Clicks Basic Mobility (PT)  -LR      User Key  (r) = Recorded By, (t) = Taken By, (c) = Cosigned By    Initials Name Provider Type    Jaja Mcdonald, PT Physical Therapist    Peter Benitez, OT Occupational Therapist        Physical Therapy Education                 Title: PT OT SLP Therapies (Done)     Topic: Physical Therapy (Done)     Point: Mobility training (Done)     Learning Progress Summary           Patient Acceptance, E,D,H, VU,DU by LR at  6/13/2021 0929    Comment: Educated on cervical precautions, correct log rolling technique, correct sit<->stand t/f technique, correct gait mechanics, safety with mobility, LE HEP, cervical  HEP, correct stair training technique, and correct car t/f technique.    Acceptance, E,D,H, VU,NR by LR at 6/12/2021 0815    Comment: Issued and reviewed written/illustrated cervical HEP and precautions. Educated on correct log rolling technique, correct sit<->stand t/f technique, correct gait mechanics, safety with mobility, and progression of POC.                   Point: Home exercise program (Done)     Learning Progress Summary           Patient Acceptance, E,D,H, VU,DU by LR at 6/13/2021 0929    Comment: Educated on cervical precautions, correct log rolling technique, correct sit<->stand t/f technique, correct gait mechanics, safety with mobility, LE HEP, cervical  HEP, correct stair training technique, and correct car t/f technique.    Acceptance, E,D,H, VU,NR by LR at 6/12/2021 0815    Comment: Issued and reviewed written/illustrated cervical HEP and precautions. Educated on correct log rolling technique, correct sit<->stand t/f technique, correct gait mechanics, safety with mobility, and progression of POC.                   Point: Body mechanics (Done)     Learning Progress Summary           Patient Acceptance, E,D,H, VU,DU by LR at 6/13/2021 0929    Comment: Educated on cervical precautions, correct log rolling technique, correct sit<->stand t/f technique, correct gait mechanics, safety with mobility, LE HEP, cervical  HEP, correct stair training technique, and correct car t/f technique.    Acceptance, E,D,H, VU,NR by LR at 6/12/2021 0815    Comment: Issued and reviewed written/illustrated cervical HEP and precautions. Educated on correct log rolling technique, correct sit<->stand t/f technique, correct gait mechanics, safety with mobility, and progression of POC.                   Point: Precautions (Done)      Learning Progress Summary           Patient Acceptance, E,D,H, VU,DU by LR at 6/13/2021 0929    Comment: Educated on cervical precautions, correct log rolling technique, correct sit<->stand t/f technique, correct gait mechanics, safety with mobility, LE HEP, cervical  HEP, correct stair training technique, and correct car t/f technique.    Acceptance, E,D,H, VU,NR by LR at 6/12/2021 0815    Comment: Issued and reviewed written/illustrated cervical HEP and precautions. Educated on correct log rolling technique, correct sit<->stand t/f technique, correct gait mechanics, safety with mobility, and progression of POC.                               User Key     Initials Effective Dates Name Provider Type Discipline     06/19/15 -  Jaja Barr, PT Physical Therapist PT              PT Recommendation and Plan  Planned Therapy Interventions (PT): stair training  Plan of Care Reviewed With: patient  Progress: improving  Outcome Summary: Patient increased gait distance to 450 feet with RW and step through gait pattern, limited by fatigue and weakness. Gait continues to be ataxic and continues to exhibit occasional B knee buckling. Patient climbed 5 steps with bilateral handrails with no difficulty. Patient has been d/c home with family and HHPT.     Time Calculation:   PT Charges     Row Name 06/13/21 0929             Time Calculation    Start Time  0929  -LR      PT Received On  06/13/21  -LR      PT Goal Re-Cert Due Date  06/22/21  -LR         Timed Charges    12981 - PT Therapeutic Exercise Minutes  14  -LR      60296 - Gait Training Minutes   10  -LR         Total Minutes    Timed Charges Total Minutes  24  -LR       Total Minutes  24  -LR        User Key  (r) = Recorded By, (t) = Taken By, (c) = Cosigned By    Initials Name Provider Type    LR Jaja Barr, PT Physical Therapist        Therapy Charges for Today     Code Description Service Date Service Provider Modifiers Qty    93157052837  PT  THER PROC EA 15 MIN 6/12/2021 Jaja Barr, PT GP 1    51562223267 HC GAIT TRAINING EA 15 MIN 6/12/2021 Jaja Barr, PT GP 1    84484654628 HC PT EVAL MOD COMPLEXITY 4 6/12/2021 Jaja Barr, PT GP 1    96813166513 HC PT THER PROC EA 15 MIN 6/13/2021 Jaja Barr, PT GP 1    22639553390 HC GAIT TRAINING EA 15 MIN 6/13/2021 Jaja Barr, PT GP 1          PT G-Codes  Outcome Measure Options: AM-PAC 6 Clicks Daily Activity (OT)  AM-PAC 6 Clicks Score (PT): 18  AM-PAC 6 Clicks Score (OT): 24    PT Discharge Summary  Anticipated Discharge Disposition (PT): home with assist, home with home health  Reason for Discharge: Discharge from facility  Outcomes Achieved: Patient able to partially acheive established goals  Discharge Destination: Home with assist, Home with home health    Jaja Barr, PT  6/13/2021

## 2021-06-13 NOTE — PLAN OF CARE
Problem: Adult Inpatient Plan of Care  Goal: Plan of Care Review  Recent Flowsheet Documentation  Taken 6/13/2021 0929 by Jaja Barr, PT  Progress: improving  Plan of Care Reviewed With: patient  Outcome Summary: Patient increased gait distance to 450 feet with RW and step through gait pattern, limited by fatigue and weakness. Gait continues to be ataxic and continues to exhibit occasional B knee buckling. Patient climbed 5 steps with bilateral handrails with no difficulty. Patient has been d/c home with family and HHPT.   Goal Outcome Evaluation:  Plan of Care Reviewed With: patient        Progress: improving  Outcome Summary: Patient increased gait distance to 450 feet with RW and step through gait pattern, limited by fatigue and weakness. Gait continues to be ataxic and continues to exhibit occasional B knee buckling. Patient climbed 5 steps with bilateral handrails with no difficulty. Patient has been d/c home with family and HHPT.

## 2021-06-13 NOTE — PROGRESS NOTES
[facility]    Subjective   Diana Alfaro 1965 55 y.o. female     06/13/21    Chief complaint: Walked with physical therapy    HPI  2 Days Post-Op cervical myelopathy and decompression from C3-C6.      Scheduled Meds:atorvastatin, 40 mg, Oral, Nightly  baclofen, 10 mg, Oral, Q8H  fluticasone, 2 spray, Nasal, Daily  ketotifen, 1 drop, Both Eyes, BID  losartan, 50 mg, Oral, BID  polyethylene glycol, 17 g, Oral, Daily  senna-docusate sodium, 2 tablet, Oral, BID  sodium chloride, 3 mL, Intravenous, Q12H        PRN Meds:.•  acetaminophen  •  HYDROcodone-acetaminophen  •  HYDROmorphone **AND** naloxone  •  metoclopramide  •  ondansetron  •  [COMPLETED] Insert peripheral IV **AND** sodium chloride  •  sodium chloride  •  temazepam    Intake/Output                             06/11/21 0701 - 06/12/21 0700 06/12/21 0701 - 06/13/21 0700 06/13/21 0701 - 06/14/21 0700                            [id number] [id number] Total 7452-5153 8704-7316 Total 7794-4051 9593-0768 Total                    Intake    P.O.  --  -- --  --  -- --  240  -- 240    I.V.  1000  -- 1000  --  -- --  --  -- --    Total Intake 1000 -- 1000 -- -- -- 240 -- 240       Output    Urine  --  2650 2650  500  1350 1850  --  -- --    Total Output -- 2650 2650 500 1350 1850 -- -- --          Imaging Results (Last 24 Hours)     ** No results found for the last 24 hours. **          Patient Active Problem List    Diagnosis    • *Cervical spondylosis with myelopathy [M47.12]    • Chest pain [R07.9]    • Drug therapy changed [Z79.899]    • Precordial pain [R07.2]    • Essential hypertension [I10]    • Hyperlipidemia [E78.5]         Neurologic Exam  Good strength in the  and upper extremities.  Difficulties with fine motor control.  She continues with an antalgic gait and knees buckling.    Assessment/Plan   1.  Cervical myelopathy, status post laminectomies of C3-5 in the upper margin of C6, 6/11/2021.   Patient is ambulatory with an antalgic gait and knee buckling.  She walked with physical therapy with a rolling walker.  2.  Osteoarthritis  3.  Club deformity right foot  4.  We will plan on discharge today with home health physical therapy and Occupational Therapy.  5.  We will plan for the patient to be seen in the [place] neurosurgery clinic for suture removal.     Isabella Ibarra PA-C

## 2021-06-13 NOTE — THERAPY DISCHARGE NOTE
Acute Care - Occupational Therapy Discharge   [place]    Patient Name: Diana Alfaro  : 1965    MRN: [id number]                              Today's Date: 2021       Admit Date: 6/10/2021    Visit Dx:     ICD-10-CM ICD-9-CM   1. Cervical spondylosis with myelopathy  M47.12 721.1   2. Cervical myelopathy (CMS/HCC)  G95.9 721.1   3. 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 Neurogenic bladder  N31.9 596.54   5. Mixed hyperlipidemia  E78.2 272.2     Patient Active Problem List   Diagnosis   • Essential hypertension   • Hyperlipidemia   • Precordial pain   • Drug therapy changed   • Chest pain   • Cervical spondylosis with myelopathy   • Neurogenic bladder   • Club foot     Past Medical History:   Diagnosis Date   • Arthritis    • Chest pain    • Club foot    • Deviated septum    • Elevated cholesterol    • Environmental allergies    • Gout    • Hyperlipidemia    • Hypertension    • Sinusitis      Past Surgical History:   Procedure Laterality Date   • CHOLECYSTECTOMY     • FOOT SURGERY      s/p clubfoot, R   • SEPTOPLASTY, RESECTION INFERIOR TURBINATES Bilateral 2018    Procedure: SEPTOPLASTY ONLY;  Surgeon: Lorenzo Ordoñez MD;  Location: [facility] OR;  Service: ENT     General Information     Row Name 21 0950          OT Time and Intention    Document Type  discharge evaluation/summary  -TA     Mode of Treatment  occupational therapy  -TA     Row Name 21 0950          General Information    Patient Profile Reviewed  yes  -TA     Prior Level of Function  independent:;gait;transfer;bed mobility;min assist:;ADL's  -TA     Existing Precautions/Restrictions  fall Cervical precautions  -TA     Barriers to Rehab  none identified  -TA     Row Name 21 0950          Occupational Profile    Reason for Services/Referral (Occupational Profile)  fxl decline from PLOF  -TA     Patient Goals (Occupational Profile)  go home  -TA     Row Name 21 0954           Living Environment    Lives With  alone Family close by  -TA     Row Name 06/13/21 0950          Cognition    Orientation Status (Cognition)  oriented x 4  -TA     Row Name 06/13/21 0950          Safety Issues, Functional Mobility    Safety Issues Affecting Function (Mobility)  safety precautions follow-through/compliance  -TA     Impairments Affecting Function (Mobility)  range of motion (ROM)  -TA       User Key  (r) = Recorded By, (t) = Taken By, (c) = Cosigned By    Initials Name Provider Type    Peter Benitez OT Occupational Therapist        Mobility/ADL's     Row Name 06/13/21 0950          Bed Mobility    Comment (Bed Mobility)  Pt UIC  -TA     Row Name 06/13/21 0950          Functional Mobility    Functional Mobility- Comment  Defer to PT  -TA     Row Name 06/13/21 0950          Activities of Daily Living    BADL Assessment/Intervention  lower body dressing;bathing  -TA     Row Name 06/13/21 0950          Lower Body Dressing Assessment/Training    [place] Level (Lower Body Dressing)  doff;don;pants/bottoms;shoes/slippers;socks;modified independence Following AE training  -TA     Assistive Devices (Lower Body Dressing)  long-handled shoe horn;reacher;sock-aid  -TA     Position (Lower Body Dressing)  unsupported sitting  -TA     Row Name 06/13/21 0950          Bathing Assessment/Intervention    [place] Level (Bathing)  distal lower extremities/feet;modified independence simulation  -TA     Assistive Devices (Bathing)  long-handled sponge  -TA     Position (Bathing)  unsupported sitting  -TA       User Key  (r) = Recorded By, (t) = Taken By, (c) = Cosigned By    Initials Name Provider Type    Peter Benitez OT Occupational Therapist        Obj/Interventions     Row Name 06/13/21 0950          Sensory Assessment (Somatosensory)    Sensory Assessment (Somatosensory)  bilateral UE;sensation intact  -TA     Row Name 06/13/21 0950          Vision Assessment/Intervention    Visual  Impairment/Limitations  WFL  -TA     Row Name 06/13/21 0950          Range of Motion Comprehensive    General Range of Motion  no range of motion deficits identified  -TA     Comment, General Range of Motion  WFL within cervical precautions  -TA     Row Name 06/13/21 0950          Strength Comprehensive (MMT)    Comment, General Manual Muscle Testing (MMT) Assessment  grossly WFL  -TA     Row Name 06/13/21 0950          Balance    Balance Assessment  sitting dynamic balance  -TA     Dynamic Sitting Balance  WFL;unsupported  -TA     Balance Interventions  occupation based/functional task;weight shifting activity  -TA       User Key  (r) = Recorded By, (t) = Taken By, (c) = Cosigned By    Initials Name Provider Type    Peter Benitez, OT Occupational Therapist        Goals/Plan     Row Name 06/13/21 0950          Bathing Goal 1 (OT)    Activity/Device (Bathing Goal 1, OT)  lower body bathing;long-handled sponge  -TA     [place] Level/Cues Needed (Bathing Goal 1, OT)  modified independence  -TA     Time Frame (Bathing Goal 1, OT)  1 day  -TA     Progress/Outcomes (Bathing Goal 1, OT)  goal met  -TA     Row Name 06/13/21 0950          Dressing Goal 1 (OT)    Activity/Device (Dressing Goal 1, OT)  lower body dressing;long-handled shoe horn;reacher;sock-aid  -TA     [place]/Cues Needed (Dressing Goal 1, OT)  modified independence  -TA     Time Frame (Dressing Goal 1, OT)  1 day  -TA     Progress/Outcome (Dressing Goal 1, OT)  goal met  -TA     Row Name 06/13/21 0950          Therapy Assessment/Plan (OT)    Planned Therapy Interventions (OT)  adaptive equipment training;BADL retraining;occupation/activity based interventions;patient/caregiver education/training  -TA       User Key  (r) = Recorded By, (t) = Taken By, (c) = Cosigned By    Initials Name Provider Type    Peter Benitez, OT Occupational Therapist        Clinical Impression     Row Name 06/13/21 0950          Pain Assessment     Additional Documentation  Pain Scale: Numbers Pre/Post-Treatment (Group)  -TA     Row Name 06/13/21 0950          Pain Scale: Numbers Pre/Post-Treatment    Pretreatment Pain Rating  2/10  -TA     Posttreatment Pain Rating  2/10  -TA     Pain Location - Orientation  posterior  -TA     Pain Location  neck  -TA     Pre/Posttreatment Pain Comment  Pt tolerated  -TA     Pain Intervention(s)  Ambulation/increased activity;Repositioned  -TA     Row Name 06/13/21 0950          Plan of Care Review    Plan of Care Reviewed With  patient  -TA     Progress  improving  -TA     Outcome Summary  VSS; Pt presents s/p laminectomy C3-6 with deficits in ADL performance. Pt issued AE and engaged in ADL retraining for LB ADLs. Following ADL retraining, pt is Mod Ind with LBB/LBD. No safety concerns. No further acute skilled OT services indicated at this time. Recommend home with assist and HHOT at disharge.  -TA     Row Name 06/13/21 0950          Therapy Assessment/Plan (OT)    Patient/Family Therapy Goal Statement (OT)  go home  -TA     Rehab Potential (OT)  good, to achieve stated therapy goals  -TA     Criteria for Skilled Therapeutic Interventions Met (OT)  yes;skilled treatment is necessary  -TA     Therapy Frequency (OT)  evaluation only  -TA     Predicted Duration of Therapy Intervention (OT)  1 day  -TA     Row Name 06/13/21 0950          Therapy Plan Review/Discharge Plan (OT)    Anticipated Discharge Disposition (OT)  home with assist;home with home health  -TA     Row Name 06/13/21 0988          Vital Signs    Pre Systolic BP Rehab  -- VSS; RN cleared pt for tx  -TA     O2 Delivery Pre Treatment  room air  -TA     O2 Delivery Intra Treatment  room air  -TA     O2 Delivery Post Treatment  room air  -TA     Pre Patient Position  Sitting  -TA     Intra Patient Position  Sitting  -TA     Post Patient Position  Sitting  -TA     Row Name 06/13/21 0920          Positioning and Restraints    Pre-Treatment Position  sitting in  chair/recliner  -TA     Post Treatment Position  chair  -TA     In Chair  notified nsg;reclined;call light within reach;encouraged to call for assist;exit alarm on;waffle cushion;legs elevated  -TA       User Key  (r) = Recorded By, (t) = Taken By, (c) = Cosigned By    Initials Name Provider Type    Peter Benitez OT Occupational Therapist        Outcome Measures     Row Name 06/13/21 0950          How much help from another is currently needed...    Putting on and taking off regular lower body clothing?  4  -TA     Bathing (including washing, rinsing, and drying)  4  -TA     Toileting (which includes using toilet bed pan or urinal)  4  -TA     Putting on and taking off regular upper body clothing  4  -TA     Taking care of personal grooming (such as brushing teeth)  4  -TA     Eating meals  4  -TA     AM-PAC 6 Clicks Score (OT)  24  -TA     Row Name 06/13/21 0950          Functional Assessment    Outcome Measure Options  AM-PAC 6 Clicks Daily Activity (OT)  -TA       User Key  (r) = Recorded By, (t) = Taken By, (c) = Cosigned By    Initials Name Provider Type    Peter Benitez OT Occupational Therapist        Occupational Therapy Education                 Title: PT OT SLP Therapies (Done)     Topic: Occupational Therapy (Done)     Point: ADL training (Done)     Description:   Instruct learner(s) on proper safety adaptation and remediation techniques during self care or transfers.   Instruct in proper use of assistive devices.              Learning Progress Summary           Patient Acceptance, E,TB, VU by TA at 6/13/2021 1020                   Point: Home exercise program (Done)     Description:   Instruct learner(s) on appropriate technique for monitoring, assisting and/or progressing therapeutic exercises/activities.              Learning Progress Summary           Patient Acceptance, E,TB, VU by TA at 6/13/2021 1020                   Point: Precautions (Done)     Description:   Instruct  learner(s) on prescribed precautions during self-care and functional transfers.              Learning Progress Summary           Patient Acceptance, E,TB, VU by TA at 6/13/2021 1020                   Point: Body mechanics (Done)     Description:   Instruct learner(s) on proper positioning and spine alignment during self-care, functional mobility activities and/or exercises.              Learning Progress Summary           Patient Acceptance, E,TB, VU by TA at 6/13/2021 1020                               User Key     Initials Effective Dates Name Provider Type Discipline    TA 03/14/16 -  Peter Herbert OT Occupational Therapist OT              OT Recommendation and Plan  Retired Outcome Summary/Treatment Plan (OT)  Anticipated Discharge Disposition (OT): home with assist, home with home health  Planned Therapy Interventions (OT): adaptive equipment training, BADL retraining, occupation/activity based interventions, patient/caregiver education/training  Therapy Frequency (OT): evaluation only  Plan of Care Review  Plan of Care Reviewed With: patient  Progress: improving  Outcome Summary: VSS; Pt presents s/p laminectomy C3-6 with deficits in ADL performance. Pt issued AE and engaged in ADL retraining for LB ADLs. Following ADL retraining, pt is Mod Ind with LBB/LBD. No safety concerns. No further acute skilled OT services indicated at this time. Recommend home with assist and HHOT at disharge.  Plan of Care Reviewed With: patient  Outcome Summary: VSS; Pt presents s/p laminectomy C3-6 with deficits in ADL performance. Pt issued AE and engaged in ADL retraining for LB ADLs. Following ADL retraining, pt is Mod Ind with LBB/LBD. No safety concerns. No further acute skilled OT services indicated at this time. Recommend home with assist and HHOT at disharge.     Time Calculation:   Time Calculation- OT     Row Name 06/13/21 0950             Time Calculation- OT    OT Start Time  0950 ttc 16 minutes  -TA      Total  Timed Code Minutes- OT  16 minute(s)  -TA      OT Received On  06/13/21  -TA         Untimed Charges    OT Eval/Re-eval Minutes  38  -TA         Total Minutes    Untimed Charges Total Minutes  38  -TA       Total Minutes  38  -TA        User Key  (r) = Recorded By, (t) = Taken By, (c) = Cosigned By    Initials Name Provider Type    TA Peter Herbert, OT Occupational Therapist        Therapy Charges for Today     Code Description Service Date Service Provider Modifiers Qty    [id number]  OT EVAL MOD COMPLEXITY 3 6/13/2021 Peter Herbert, OT GO 1    [id number]  OT SELF CARE/MGMT/TRAIN EA 15 MIN 6/13/2021 Peter Herbert, OT GO 1               Peter Herbert OT  6/13/2021

## 2021-06-13 NOTE — DISCHARGE SUMMARY
[facility]    Date of Admission: 6/10/2021  Date of Discharge:  6/13/2021    Discharge Diagnosis:   1.  cervical myelopathy  2.  Neurogenic bladder    Procedures Performed  Procedure(s):  CERVICAL LAMINECTOMY DECOMPRESSION POSTERIOR C3-5       Presenting Problem/History of Present Illness  Cervical myelopathy (CMS/[facility]) [G95.9]     Hospital Course  Patient is a 55 y.o. female presented with symptoms of cervical myelopathy with progressive gait instability worse for the last 3 months. She underwent cervical decompression on 6/11/2021 without postoperative difficulties. She is ambulatory with physical therapy utilizing a roller walker for balance. She continues to have symptoms of antalgic gait and buckling of the knees as prior to surgery. The numbness in her legs and fingertips has improved. Her wound is dry and intact, she is taking p.o. without any nausea and vomiting, she is voiding with urgency, her pain is controlled with minimal medications. The patient is medically stable and ready for discharge home. She will follow up in the neurosurgery office in 2 weeks for suture removal.    Condition on Discharge:  satisfactory    Discharge Disposition  Home or Self Care    Discharge Medications     Discharge Medications      New Medications      Instructions Start Date   baclofen 10 MG tablet  Commonly known as: LIORESAL   10 mg, Oral, Every 8 Hours Scheduled      HYDROcodone-acetaminophen 5-325 MG per tablet  Commonly known as: NORCO   1 tablet, Oral, Every 4 Hours PRN      methocarbamol 750 MG tablet  Commonly known as: ROBAXIN   750 mg, Oral, 4 Times Daily PRN      traMADol 50 MG tablet  Commonly known as: Ultram   50 mg, Oral, Every 8 Hours PRN         Changes to Medications      Instructions Start Date   atorvastatin 80 MG tablet  Commonly known as: LIPITOR  What changed:   · how much to take  · when to take this   80 mg, Oral, Daily         Continue These  Medications      Instructions Start Date   CALCIUM-MAG-VIT C-VIT D PO   Oral      CALCIUM/MAGNESIUM/ZINC PO   Oral      Caltrate 600+D Plus Minerals 600-800 MG-UNIT chewable tablet   Oral      CHOLESTOFF COMPLETE PO   3 tablets, Oral, 2 Times Daily      fluticasone 50 MCG/ACT nasal spray  Commonly known as: FLONASE   2 sprays, Nasal, Daily      ibuprofen 800 MG tablet  Commonly known as: ADVIL,MOTRIN   800 mg, Oral, Every 6 Hours PRN      ketotifen 0.025 % ophthalmic solution  Commonly known as: ZADITOR   1 drop, 2 Times Daily      loratadine 10 MG tablet  Commonly known as: CLARITIN   Oral, Daily      losartan 50 MG tablet  Commonly known as: COZAAR   50 mg, Oral, 2 Times Daily      vitamin D 1.25 MG (94711 UT) capsule capsule  Commonly known as: ERGOCALCIFEROL   50,000 Units, Oral, Weekly             Follow-up Appointments  Future Appointments   Date Time Provider Department Center   8/17/2021  1:45 PM Cornell Perkins MD [facility]     Additional Instructions for the Follow-ups that You Need to Schedule     Discharge Follow-up with Specified Provider: walker Alejandra; 2 Weeks   As directed      To: walker Alejandra    Follow Up: 2 Weeks    Follow Up Details: clement for suture removal               Referring Provider  MD RODRIGUEZ Corral Martha C, MD Debbie A Croucher, PA-C  06/13/21  09:10 EDT

## 2021-06-13 NOTE — PLAN OF CARE
Problem: Adult Inpatient Plan of Care  Goal: Plan of Care Review  Recent Flowsheet Documentation  Taken 6/13/2021 0950 by Peter Herbert, OT  Progress: improving  Plan of Care Reviewed With: patient  Outcome Summary:   VSS   Pt presents s/p laminectomy C3-6 with deficits in ADL performance. Pt issued AE and engaged in ADL retraining for LB ADLs. Following ADL retraining, pt is Mod Ind with LBB/LBD. No safety concerns. No further acute skilled OT services indicated at this time. Recommend home with assist and HHOT at disharge.   Goal Outcome Evaluation:  Plan of Care Reviewed With: patient        Progress: improving  Outcome Summary: VSS; Pt presents s/p laminectomy C3-6 with deficits in ADL performance. Pt issued AE and engaged in ADL retraining for LB ADLs. Following ADL retraining, pt is Mod Ind with LBB/LBD. No safety concerns. No further acute skilled OT services indicated at this time. Recommend home with assist and HHOT at disharge.

## 2021-06-13 NOTE — DISCHARGE INSTRUCTIONS
Please place a Aquacel dressing over the surgical incision and give her instructions to remove this in 1 week. She can shower today with the Aquacel dressing in place. After she removes the Aquacel she can cover with an elbow Band-Aid as needed. She should call the office with any increased drainage from her incision, fever or chills or worsening weakness.

## 2021-06-14 NOTE — CASE MANAGEMENT/SOCIAL WORK
Discharge Planning Assessment  [facility]     Patient Name: Diana Alfaro  MRN: [id number]  Today's Date: 6/14/2021    Admit Date: 6/10/2021    Discharge Needs Assessment    No documentation.       Discharge Plan     Row Name 06/14/21 1134       Plan    Plan  Home with family assistance and [facility]    Patient/Family in Agreement with Plan  yes    Plan Comments  Ms. Alfaro was discharged yesterday.  CM arranged for home health PT and OT at [facility].  CM called and faxed the referral to [facility] at home health agency.  [facility] has accepted the patient and the therapist is planning on seeing Ms. Alfaro tomorrow.  Called Ms. Alfaro at home and on cell phone and left voice message to notify her of home health agency.  Requested on voicemail that she contact CM if any questions.    Final Discharge Disposition Code  06 - home with home health care        Continued Care and Services - Discharged on 6/13/2021 Admission date: 6/10/2021 - Discharge disposition: Home or Self Care    Durable Medical Equipment Coordination complete    Service Provider Request Status Selected Services Address Phone Fax Patient Preferred    [facility]   Selected Durable Medical Equipment [address] [place] [id number] [phone] --          Home Medical Care Coordination complete    Service Provider Request Status Selected Services Address Phone Fax Patient Preferred    [facility] - [facility] [address] [place] [phone] --              Expected Discharge Date and Time     Expected Discharge Date Expected Discharge Time    Jun 13, 2021         Demographic Summary    No documentation.       Functional Status    No documentation.       Psychosocial    No documentation.       Abuse/Neglect    No documentation.       Legal    No documentation.       Substance Abuse    No documentation.        Patient Forms    No documentation.           Floridalma Marmolejo RN

## 2021-06-24 ENCOUNTER — OFFICE VISIT (OUTPATIENT)
Dept: NEUROSURGERY | Facility: CLINIC | Age: 56
End: 2021-06-24

## 2021-06-24 VITALS
BODY MASS INDEX: 28.86 KG/M2 | DIASTOLIC BLOOD PRESSURE: 82 MMHG | SYSTOLIC BLOOD PRESSURE: 132 MMHG | HEIGHT: 60 IN | WEIGHT: 147 LBS | TEMPERATURE: 97.8 F

## 2021-06-24 DIAGNOSIS — M47.12 CERVICAL SPONDYLOSIS WITH MYELOPATHY: ICD-10-CM

## 2021-06-24 DIAGNOSIS — Q66.01 CONGENITAL TALIPES EQUINOVARUS DEFORMITY OF RIGHT FOOT: Primary | ICD-10-CM

## 2021-06-24 PROCEDURE — 99024 POSTOP FOLLOW-UP VISIT: CPT | Performed by: PHYSICIAN ASSISTANT

## 2021-06-24 NOTE — PROGRESS NOTES
Patient: Diana Alfaro  : 1965  Gender: female    Primary Care Provider: Anusha Licea MD    Requesting Provider: As above    Chief Complaint: Postop    History of Present Illness:  Diana Alfaro is a 55-year-old woman who present to our clinic with symptoms of cervical myelopathy and progressive gait instability, worsening in the last 3 months.  Studies demonstrated severe stenosis at C3-4, C4-5 and C5-6 with a congenitally narrowed canal and chronic myelomalacia.  As such on 2021 she underwent decompression C3-5.  Surgery was without complication.  She was discharged on 2021.    Ms. Alfaro present today for suture removal.  She states that she is feeling well.  She feels that her gait is improving well as numbness and tingling of her hands and feet.  Her neck pain is minimal.  She is taking Tylenol when needed.  She is anxious to advance her activity.  She has been staying with a friend however plans to go home soon.  No issues with her cervical incision.  She is pleased with her progress.      Past Medical and Surgical History:  Past Medical History:   Diagnosis Date   • Arthritis    • Chest pain    • Club foot    • Deviated septum    • Elevated cholesterol    • Environmental allergies    • Gout    • Hyperlipidemia    • Hypertension    • Sinusitis      Past Surgical History:   Procedure Laterality Date   • CERVICAL LAMINECTOMY DECOMPRESSION POSTERIOR Bilateral 2021    Procedure: CERVICAL LAMINECTOMY DECOMPRESSION POSTERIOR C3-5;  Surgeon: Nacho Sheriff MD;  Location: [facility];  Service: Neurosurgery;  Laterality: Bilateral;   • CHOLECYSTECTOMY     • FOOT SURGERY      s/p clubfoot, R   • SEPTOPLASTY, RESECTION INFERIOR TURBINATES Bilateral 2018    Procedure: SEPTOPLASTY ONLY;  Surgeon: Lorenzo Ordoñez MD;  Location: [facility];  Service: ENT       Current Medications:    Current Outpatient Medications:   •  atorvastatin (LIPITOR) 80 MG tablet, Take 1 tablet by mouth Daily.  (Patient taking differently: Take 40 mg by mouth Every Night.), Disp: 30 tablet, Rfl: 2  •  baclofen (LIORESAL) 10 MG tablet, Take 1 tablet by mouth Every 8 (Eight) Hours., Disp: 90 tablet, Rfl: 1  •  Calcium Carbonate-Vit D-Min (Caltrate 600+D Plus Minerals) 600-800 MG-UNIT chewable tablet, Chew., Disp: , Rfl:   •  CALCIUM-MAG-VIT C-VIT D PO, Take  by mouth., Disp: , Rfl:   •  fluticasone (FLONASE) 50 MCG/ACT nasal spray, 2 sprays into the nostril(s) as directed by provider Daily., Disp: , Rfl:   •  ibuprofen (ADVIL,MOTRIN) 800 MG tablet, Take 800 mg by mouth Every 6 (Six) Hours As Needed for Mild Pain ., Disp: , Rfl:   •  ketotifen (ZADITOR) 0.025 % ophthalmic solution, 1 drop 2 (Two) Times a Day., Disp: , Rfl:   •  loratadine (CLARITIN) 10 MG tablet, Take  by mouth Daily., Disp: , Rfl: 5  •  losartan (COZAAR) 50 MG tablet, Take 1 tablet by mouth 2 (Two) Times a Day., Disp: 60 tablet, Rfl: 5  •  Multiple Minerals (CALCIUM/MAGNESIUM/ZINC PO), Take  by mouth., Disp: , Rfl:   •  Plant Sterol Stanol-Pantethine (CHOLESTOFF COMPLETE PO), Take 3 tablets by mouth 2 (Two) Times a Day., Disp: , Rfl:   •  vitamin D (ERGOCALCIFEROL) 1.25 MG (87051 UT) capsule capsule, Take 50,000 Units by mouth 1 (One) Time Per Week., Disp: , Rfl:     Allergies:  Allergies   Allergen Reactions   • Amlodipine Unknown - Low Severity   • Beta Adrenergic Blockers Other (See Comments)     bradycardia         Review of Systems   HENT: Positive for ear pain, rhinorrhea, sore throat, tinnitus and trouble swallowing.    Cardiovascular: Positive for leg swelling.   Neurological: Positive for weakness.   All other systems reviewed and are negative.        Physical Exam  Patient is alert, oriented and in no apparent distress  Moves extremities to command with equal strength  Her gait is still somewhat spastic antalgic secondary to a right clubfoot, however she is able to ambulate without assistance  Posterior cervical incision is well intact.  There is  "some incisional erythema and mild swelling however no fluctuance or evidence of drainage.  The area is nontender to palpation.    Vitals:    06/24/21 1334   BP: 132/82   Temp: 97.8 °F (36.6 °C)   Weight: 66.7 kg (147 lb)   Height: 152.4 cm (60\")       Patient's Body mass index is 28.71 kg/m². indicating that she is overweight (BMI 25-29.9).      Assessment:  1.  Cervical myelopathy status post decompression    Plan:  This is a 55-year-old woman who is seen today approximately 2 weeks out from uncomplicated cervical laminectomy C3-5 progress cervical stenosis and myelopathy.  She is feeling great at this time and already notes improvement of her gait and extremity sensory alteration.  She is working with home health physical therapy.  I have given her a new PT order should she need this for outpatient.    Additionally patient asked if we would write her an order for right foot orthotic.  Typically her podiatrist writes this, however she feels that she needs 1 every 6 months.  I gave her an order however stated that ongoing long-term management of this will need to come for her PCP or podiatrist.    Restrictions at this time moving forward were discussed.  Patient will return in 3 months for continued monitoring.  She will call with any concerns or worsening symptoms in the interim.        Ritika Alejandra PA-C  "

## 2021-07-01 ENCOUNTER — TELEPHONE (OUTPATIENT)
Dept: NEUROSURGERY | Facility: CLINIC | Age: 56
End: 2021-07-01

## 2021-07-01 NOTE — TELEPHONE ENCOUNTER
"Provider:  Jaziel  Caller: Navin  Time of call:   --  Phone #:  [phone]  Surgery:  CERVICAL LAMINECTOMY DECOMPRESSION POSTERIOR C3-5  Surgery Date:  6/11/21-Jaziel  Last visit:   6/10/21-Jaziel  Next visit: 9/20/21-Nahid          Reason for call:         \"PT CALLED AND STATED SHE STARTED PHYSICAL THERAPY ON 06/30/21.  PT WOULD LIKE TO KNOW WHAT KIND OF THERAPY FOR HER NECK IS SAFE TO DO.  PT IS CALLING JUST TO MAKE SURE EVERYTHING IS SAFE WITH HER HX AND HER BEING IN THERAPY.  PT IS WORRIED IT WILL SET HER BACK AND WANTS TO MAKE SURE EVERYTHING SAFE.  SHE WOULD LIKE TO KNOW WHAT KIND OF EXERCISE IS GOOD FOR HER.  SHE STATES SHE WORRIED BECAUSE ITS STILL SORE A LITTLE AND BECAUSE ITS ONLY BEEN 3 WEEKS SINCE SX.\"    "

## 2021-07-01 NOTE — TELEPHONE ENCOUNTER
Caller: RADHACHRISTIAN PAZ    Relationship: SELF    Best call back number: [phone]    What is the best time to reach you:     Who are you requesting to speak with (clinical staff, provider,  specific staff member):     Do you know the name of the person who called:     What was the call regarding: PT CALLED AND STATED SHE STARTED PHYSICAL THERAPY ON 06/30/21.  PT WOULD LIKE TO KNOW WHAT KIND OF THERAPY FOR HER NECK IS SAFE TO DO.  PT IS CALLING JUST TO MAKE SURE EVERYTHING IS SAFE WITH HER HX AND HER BEING IN THERAPY.  PT IS WORRIED IT WILL SET HER BACK AND WANTS TO MAKE SURE EVERYTHING SAFE.  SHE WOULD LIKE TO KNOW WHAT KIND OF EXERCISE IS GOOD FOR HER.  SHE STATES SHE WORRIED BECAUSE ITS STILL SORE A LITTLE AND BECAUSE ITS ONLY BEEN 3 WEEKS SINCE SX.  PT WANTED ME TO MAKE SURE THE  WAS AWARE OF HER BIRTH DEFECT ALL THE WAY DOWN THE BACK, NECK, LEG AND FOOT OF THE RIGHT SIDE, BULGING DISC AND ARTHRITIS.  PT WOULD ALSO LIKE TO KNOW WHEN SHE WOULD BE OKAY TO SLEEP ON RIGHT SIDE.    Do you require a callback:    PLEASE CALL PT  THANK YOU

## 2021-07-01 NOTE — TELEPHONE ENCOUNTER
Most physical therapy offices in the area are familiar with our group and the surgeries that we do.   - any therapy with gentle ROM exercise, soft tissue massage, even light traction would be appropriate.     As long as you are not having recurrent or new symptoms with exercises or manipulations then all is ok

## 2021-07-04 ENCOUNTER — APPOINTMENT (OUTPATIENT)
Dept: MRI IMAGING | Facility: HOSPITAL | Age: 56
End: 2021-07-04

## 2021-07-04 ENCOUNTER — APPOINTMENT (OUTPATIENT)
Dept: GENERAL RADIOLOGY | Facility: HOSPITAL | Age: 56
End: 2021-07-04

## 2021-07-04 ENCOUNTER — HOSPITAL ENCOUNTER (EMERGENCY)
Facility: HOSPITAL | Age: 56
Discharge: HOME | End: 2021-07-04
Attending: FAMILY MEDICINE | Admitting: FAMILY MEDICINE

## 2021-07-04 VITALS
DIASTOLIC BLOOD PRESSURE: 93 MMHG | WEIGHT: 146 LBS | TEMPERATURE: 97.7 F | BODY MASS INDEX: 28.66 KG/M2 | OXYGEN SATURATION: 98 % | SYSTOLIC BLOOD PRESSURE: 154 MMHG | HEART RATE: 88 BPM | HEIGHT: 60 IN | RESPIRATION RATE: 16 BRPM

## 2021-07-04 DIAGNOSIS — I15.9 SECONDARY HYPERTENSION: Primary | ICD-10-CM

## 2021-07-04 LAB
ALBUMIN SERPL-MCNC: 3.99 G/DL (ref 3.5–5.2)
ALBUMIN/GLOB SERPL: 1.2 G/DL
ALP SERPL-CCNC: 90 U/L (ref 39–117)
ALT SERPL W P-5'-P-CCNC: 23 U/L (ref 1–33)
ANION GAP SERPL CALCULATED.3IONS-SCNC: 9.9 MMOL/L (ref 5–15)
AST SERPL-CCNC: 23 U/L (ref 1–32)
B PARAPERT DNA SPEC QL NAA+PROBE: NOT DETECTED
B PERT DNA SPEC QL NAA+PROBE: NOT DETECTED
BASOPHILS # BLD AUTO: 0.04 10*3/MM3 (ref 0–0.2)
BASOPHILS NFR BLD AUTO: 0.5 % (ref 0–1.5)
BILIRUB SERPL-MCNC: <0.2 MG/DL (ref 0–1.2)
BILIRUB UR QL STRIP: NEGATIVE
BUN SERPL-MCNC: 16 MG/DL (ref 6–20)
BUN/CREAT SERPL: 21.6 (ref 7–25)
C PNEUM DNA NPH QL NAA+NON-PROBE: NOT DETECTED
CALCIUM SPEC-SCNC: 9.7 MG/DL (ref 8.6–10.5)
CHLORIDE SERPL-SCNC: 104 MMOL/L (ref 98–107)
CLARITY UR: CLEAR
CO2 SERPL-SCNC: 26.1 MMOL/L (ref 22–29)
COLOR UR: YELLOW
CREAT SERPL-MCNC: 0.74 MG/DL (ref 0.57–1)
CRP SERPL-MCNC: 0.35 MG/DL (ref 0–0.5)
D-LACTATE SERPL-SCNC: 1.5 MMOL/L (ref 0.5–2)
DEPRECATED RDW RBC AUTO: 39.1 FL (ref 37–54)
EOSINOPHIL # BLD AUTO: 0.11 10*3/MM3 (ref 0–0.4)
EOSINOPHIL NFR BLD AUTO: 1.4 % (ref 0.3–6.2)
ERYTHROCYTE [DISTWIDTH] IN BLOOD BY AUTOMATED COUNT: 12.5 % (ref 12.3–15.4)
FLUAV SUBTYP SPEC NAA+PROBE: NOT DETECTED
FLUBV RNA ISLT QL NAA+PROBE: NOT DETECTED
GFR SERPL CREATININE-BSD FRML MDRD: 81 ML/MIN/1.73
GLOBULIN UR ELPH-MCNC: 3.4 GM/DL
GLUCOSE SERPL-MCNC: 116 MG/DL (ref 65–99)
GLUCOSE UR STRIP-MCNC: NEGATIVE MG/DL
HADV DNA SPEC NAA+PROBE: NOT DETECTED
HCOV 229E RNA SPEC QL NAA+PROBE: NOT DETECTED
HCOV HKU1 RNA SPEC QL NAA+PROBE: NOT DETECTED
HCOV NL63 RNA SPEC QL NAA+PROBE: NOT DETECTED
HCOV OC43 RNA SPEC QL NAA+PROBE: NOT DETECTED
HCT VFR BLD AUTO: 40.5 % (ref 34–46.6)
HGB BLD-MCNC: 13.5 G/DL (ref 12–15.9)
HGB UR QL STRIP.AUTO: NEGATIVE
HMPV RNA NPH QL NAA+NON-PROBE: NOT DETECTED
HPIV1 RNA SPEC QL NAA+PROBE: NOT DETECTED
HPIV2 RNA SPEC QL NAA+PROBE: NOT DETECTED
HPIV3 RNA NPH QL NAA+PROBE: NOT DETECTED
HPIV4 P GENE NPH QL NAA+PROBE: NOT DETECTED
IMM GRANULOCYTES # BLD AUTO: 0.02 10*3/MM3 (ref 0–0.05)
IMM GRANULOCYTES NFR BLD AUTO: 0.3 % (ref 0–0.5)
KETONES UR QL STRIP: NEGATIVE
LEUKOCYTE ESTERASE UR QL STRIP.AUTO: NEGATIVE
LYMPHOCYTES # BLD AUTO: 1.7 10*3/MM3 (ref 0.7–3.1)
LYMPHOCYTES NFR BLD AUTO: 21.5 % (ref 19.6–45.3)
M PNEUMO IGG SER IA-ACNC: NOT DETECTED
MAGNESIUM SERPL-MCNC: 2 MG/DL (ref 1.6–2.6)
MCH RBC QN AUTO: 28.7 PG (ref 26.6–33)
MCHC RBC AUTO-ENTMCNC: 33.3 G/DL (ref 31.5–35.7)
MCV RBC AUTO: 86 FL (ref 79–97)
MONOCYTES # BLD AUTO: 0.65 10*3/MM3 (ref 0.1–0.9)
MONOCYTES NFR BLD AUTO: 8.2 % (ref 5–12)
NEUTROPHILS NFR BLD AUTO: 5.39 10*3/MM3 (ref 1.7–7)
NEUTROPHILS NFR BLD AUTO: 68.1 % (ref 42.7–76)
NITRITE UR QL STRIP: NEGATIVE
NRBC BLD AUTO-RTO: 0 /100 WBC (ref 0–0.2)
NT-PROBNP SERPL-MCNC: 196.8 PG/ML (ref 0–900)
PH UR STRIP.AUTO: 6 [PH] (ref 5–8)
PLATELET # BLD AUTO: 288 10*3/MM3 (ref 140–450)
PMV BLD AUTO: 10.4 FL (ref 6–12)
POTASSIUM SERPL-SCNC: 4 MMOL/L (ref 3.5–5.2)
PROT SERPL-MCNC: 7.4 G/DL (ref 6–8.5)
PROT UR QL STRIP: NEGATIVE
RBC # BLD AUTO: 4.71 10*6/MM3 (ref 3.77–5.28)
RHINOVIRUS RNA SPEC NAA+PROBE: NOT DETECTED
RSV RNA NPH QL NAA+NON-PROBE: NOT DETECTED
SARS-COV-2 RNA NPH QL NAA+NON-PROBE: NOT DETECTED
SODIUM SERPL-SCNC: 140 MMOL/L (ref 136–145)
SP GR UR STRIP: <=1.005 (ref 1–1.03)
TROPONIN T SERPL-MCNC: <0.01 NG/ML (ref 0–0.03)
TROPONIN T SERPL-MCNC: <0.01 NG/ML (ref 0–0.03)
TSH SERPL DL<=0.05 MIU/L-ACNC: 0.86 UIU/ML (ref 0.27–4.2)
UROBILINOGEN UR QL STRIP: NORMAL
WBC # BLD AUTO: 7.91 10*3/MM3 (ref 3.4–10.8)

## 2021-07-04 PROCEDURE — 0202U NFCT DS 22 TRGT SARS-COV-2: CPT | Performed by: FAMILY MEDICINE

## 2021-07-04 PROCEDURE — 84484 ASSAY OF TROPONIN QUANT: CPT | Performed by: FAMILY MEDICINE

## 2021-07-04 PROCEDURE — 81003 URINALYSIS AUTO W/O SCOPE: CPT | Performed by: FAMILY MEDICINE

## 2021-07-04 PROCEDURE — 99283 EMERGENCY DEPT VISIT LOW MDM: CPT

## 2021-07-04 PROCEDURE — 0 GADOBENATE DIMEGLUMINE 529 MG/ML SOLUTION: Performed by: FAMILY MEDICINE

## 2021-07-04 PROCEDURE — 83735 ASSAY OF MAGNESIUM: CPT | Performed by: FAMILY MEDICINE

## 2021-07-04 PROCEDURE — 71045 X-RAY EXAM CHEST 1 VIEW: CPT

## 2021-07-04 PROCEDURE — A9577 INJ MULTIHANCE: HCPCS | Performed by: FAMILY MEDICINE

## 2021-07-04 PROCEDURE — 99284 EMERGENCY DEPT VISIT MOD MDM: CPT

## 2021-07-04 PROCEDURE — 93005 ELECTROCARDIOGRAM TRACING: CPT | Performed by: FAMILY MEDICINE

## 2021-07-04 PROCEDURE — 72156 MRI NECK SPINE W/O & W/DYE: CPT

## 2021-07-04 PROCEDURE — 93010 ELECTROCARDIOGRAM REPORT: CPT | Performed by: INTERNAL MEDICINE

## 2021-07-04 PROCEDURE — 86140 C-REACTIVE PROTEIN: CPT | Performed by: FAMILY MEDICINE

## 2021-07-04 PROCEDURE — 80050 GENERAL HEALTH PANEL: CPT | Performed by: FAMILY MEDICINE

## 2021-07-04 PROCEDURE — 83605 ASSAY OF LACTIC ACID: CPT | Performed by: FAMILY MEDICINE

## 2021-07-04 PROCEDURE — 83880 ASSAY OF NATRIURETIC PEPTIDE: CPT | Performed by: FAMILY MEDICINE

## 2021-07-04 PROCEDURE — 87040 BLOOD CULTURE FOR BACTERIA: CPT | Performed by: FAMILY MEDICINE

## 2021-07-04 RX ORDER — ACETAMINOPHEN 500 MG
1000 TABLET ORAL ONCE
Status: COMPLETED | OUTPATIENT
Start: 2021-07-04 | End: 2021-07-04

## 2021-07-04 RX ORDER — SODIUM CHLORIDE 0.9 % (FLUSH) 0.9 %
10 SYRINGE (ML) INJECTION AS NEEDED
Status: DISCONTINUED | OUTPATIENT
Start: 2021-07-04 | End: 2021-07-05 | Stop reason: HOSPADM

## 2021-07-04 RX ADMIN — ACETAMINOPHEN 1000 MG: 500 TABLET ORAL at 23:01

## 2021-07-04 RX ADMIN — GADOBENATE DIMEGLUMINE 13 ML: 529 INJECTION, SOLUTION INTRAVENOUS at 21:15

## 2021-07-04 NOTE — ED PROVIDER NOTES
Subjective   Patient is a 55-year-old female who presents the emergency department for weakness, elevated blood pressure, and low-grade fever.  The patient states that she had a cervical laminectomy at C3-C5 a couple weeks ago at [facility].  She states that all the symptoms started over the last couple days.  She states that overall limb weakness that she was experiencing before her surgery has improved but over the past couple days she feels generally weak.  She states that her temperature has been about 99 degrees at home and that her blood pressure has been in the 170s which is unusual for her.  The patient denies any shortness of breath, nausea, vomiting, chest pain or abdominal pain.  She has not noticed any redness, pain or swelling around the incision site.  The patient denies any headache or changes in vision.  She denies any lateralizing weakness or any additional symptoms at this time          Review of Systems   Constitutional: Positive for activity change and fever. Negative for appetite change, chills and fatigue.   HENT: Negative for congestion, postnasal drip, rhinorrhea, sinus pressure, sinus pain, sneezing, sore throat and tinnitus.    Eyes: Negative for discharge and itching.   Respiratory: Negative for apnea, cough, choking, chest tightness, shortness of breath and wheezing.    Cardiovascular: Negative for chest pain, palpitations and leg swelling.   Gastrointestinal: Negative for abdominal distention, abdominal pain, constipation, diarrhea, nausea and vomiting.   Genitourinary: Negative for difficulty urinating and flank pain.   Musculoskeletal: Negative for arthralgias and back pain.   Neurological: Negative for dizziness and light-headedness.   Psychiatric/Behavioral: Negative for agitation and confusion.       Past Medical History:   Diagnosis Date   • Arthritis    • Chest pain    • Club foot    • Deviated septum    • Elevated cholesterol    • Environmental allergies    • Gout    •  Hyperlipidemia    • Hypertension    • Sinusitis        Allergies   Allergen Reactions   • Amlodipine Unknown - Low Severity   • Beta Adrenergic Blockers Other (See Comments)     bradycardia       Past Surgical History:   Procedure Laterality Date   • CERVICAL LAMINECTOMY DECOMPRESSION POSTERIOR Bilateral 6/11/2021    Procedure: CERVICAL LAMINECTOMY DECOMPRESSION POSTERIOR C3-5;  Surgeon: Nacho Sheriff MD;  [facility];  Service: Neurosurgery;  Laterality: Bilateral;   • CHOLECYSTECTOMY     • FOOT SURGERY      s/p clubfoot, R   • SEPTOPLASTY, RESECTION INFERIOR TURBINATES Bilateral 7/18/2018    Procedure: SEPTOPLASTY ONLY;  Surgeon: Lorenzo Ordoñez MD;  Location: [facility] OR;  Service: ENT       Family History   Problem Relation Age of Onset   • Heart attack Mother    • Heart attack Father    • Heart attack Maternal Aunt    • Heart attack Maternal Uncle    • Heart attack Paternal Aunt    • Heart attack Paternal Uncle    • Heart attack Maternal Grandmother    • Heart attack Maternal Grandfather    • Heart attack Paternal Grandmother    • Heart attack Paternal Grandfather        Social History     Socioeconomic History   • Marital status: Single     Spouse name: Not on file   • Number of children: Not on file   • Years of education: Not on file   • Highest education level: Not on file   Tobacco Use   • Smoking status: Never Smoker   • Smokeless tobacco: Never Used   Substance and Sexual Activity   • Alcohol use: No   • Drug use: No           Objective   Physical Exam  Vitals and nursing note reviewed.   Constitutional:       General: She is not in acute distress.     Appearance: Normal appearance. She is normal weight. She is not ill-appearing, toxic-appearing or diaphoretic.   HENT:      Head: Normocephalic.      Right Ear: External ear normal. There is no impacted cerumen.      Left Ear: External ear normal. There is no impacted cerumen.      Nose: Nose normal. No congestion or rhinorrhea.       Mouth/Throat:      Mouth: Mucous membranes are moist.      Pharynx: No oropharyngeal exudate or posterior oropharyngeal erythema.   Eyes:      General: No scleral icterus.        Right eye: No discharge.         Left eye: No discharge.      Pupils: Pupils are equal, round, and reactive to light.   Neck:      Vascular: No carotid bruit.   Cardiovascular:      Rate and Rhythm: Normal rate and regular rhythm.      Pulses: Normal pulses.      Heart sounds: Normal heart sounds. No murmur heard.   No gallop.    Pulmonary:      Effort: Pulmonary effort is normal. No respiratory distress.      Breath sounds: Normal breath sounds. No stridor. No wheezing or rhonchi.   Abdominal:      General: Abdomen is flat. There is no distension.      Palpations: There is no mass.      Tenderness: There is no abdominal tenderness.      Hernia: No hernia is present.   Musculoskeletal:         General: Normal range of motion.      Cervical back: Normal range of motion and neck supple. No rigidity or tenderness.   Lymphadenopathy:      Cervical: No cervical adenopathy.   Skin:     General: Skin is warm.      Capillary Refill: Capillary refill takes less than 2 seconds.      Comments: There is a well-healed incision in the posterior neck   Neurological:      General: No focal deficit present.      Mental Status: She is alert and oriented to person, place, and time.      Comments: Muscle strength 5 out of 5 in upper and lower extremities bilaterally no sensory deficit   Psychiatric:         Mood and Affect: Mood normal.         Behavior: Behavior normal.         Procedures           ED Course  ED Course as of Jul 04 2241   Sun Jul 04, 2021 1844 EKG is normal sinus rhythm with a rate of 85 bpm AL interval is 136 ms QRS duration is 70 ms QT/QTc is 352/418 ms    [EG]   2230 [facility] page for neurosurgical consult regarding this patient.    [EG]   7894 Case discussed with Dr. Bernard, on-call for neurosurgery at [facility]  [place].  We have reviewed the patient's laboratory data, and images.  He states that the likelihood of this being an epidural abscess is very small and the patient is safe to go home and follow-up protocol.    [EG]      ED Course User Index  [EG] Tania Cesar, DO                                           MDM  Number of Diagnoses or Management Options  Secondary hypertension: new and requires workup     Amount and/or Complexity of Data Reviewed  Clinical lab tests: ordered and reviewed  Tests in the radiology section of CPT®: ordered and reviewed  Tests in the medicine section of CPT®: ordered and reviewed  Discuss the patient with other providers: yes  Independent visualization of images, tracings, or specimens: yes    Risk of Complications, Morbidity, and/or Mortality  Presenting problems: moderate  Diagnostic procedures: moderate  Management options: moderate  General comments: Patient is a 55-year-old female with a recent laminectomy who presented to the emergency department for hypertension.  MRI with and without contrast was performed which showed a rim-enhancing lesion.  The case was discussed with neurosurgery on-call at [facility] who states that the patient is very low risk for abscess with normal labs.  He recommends she follow-up as an outpatient.  The patient's blood pressure is marginally high during her emergency department visit, but her normal home medications were administered while she was here.  She left in stable condition.    Patient Progress  Patient progress: stable      Final diagnoses:   Secondary hypertension       ED Disposition  ED Disposition     ED Disposition Condition Comment    Discharge Stable           Anusha Licea MD  [address] [place] [id number] [phone]    Schedule an appointment as soon as possible for a visit in 2 days      [facility] Emergency Department  [address] [place] [id number]  [phone]  Go  to   If symptoms worsen    Nacho Sheriff MD  [address] [place] [address]  [phone]    Schedule an appointment as soon as possible for a visit in 3 days           Medication List      Changed    atorvastatin 80 MG tablet  Commonly known as: LIPITOR  Take 1 tablet by mouth Daily.  What changed:   · how much to take  · when to take this     losartan 50 MG tablet  Commonly known as: COZAAR  Take 1 tablet by mouth 2 (Two) Times a Day.  What changed: when to take this             Tania Cesar,   07/04/21 1974

## 2021-07-05 LAB
QT INTERVAL: 352 MS
QTC INTERVAL: 418 MS

## 2021-07-05 NOTE — ED NOTES
Attempted to collect patient's 2nd troponin.Patient gone to MRI at this time.     Yuliet Camargo  07/04/21 2051

## 2021-07-09 LAB
BACTERIA SPEC AEROBE CULT: NORMAL
BACTERIA SPEC AEROBE CULT: NORMAL

## 2021-10-05 ENCOUNTER — OFFICE VISIT (OUTPATIENT)
Dept: CARDIOLOGY | Facility: CLINIC | Age: 56
End: 2021-10-05

## 2021-10-05 VITALS
TEMPERATURE: 97 F | DIASTOLIC BLOOD PRESSURE: 79 MMHG | HEIGHT: 60 IN | WEIGHT: 148 LBS | SYSTOLIC BLOOD PRESSURE: 173 MMHG | BODY MASS INDEX: 29.06 KG/M2 | OXYGEN SATURATION: 98 % | HEART RATE: 72 BPM

## 2021-10-05 DIAGNOSIS — R07.9 CHEST PAIN, UNSPECIFIED TYPE: Primary | ICD-10-CM

## 2021-10-05 DIAGNOSIS — I10 ESSENTIAL HYPERTENSION: ICD-10-CM

## 2021-10-05 PROCEDURE — 99213 OFFICE O/P EST LOW 20 MIN: CPT | Performed by: INTERNAL MEDICINE

## 2021-10-05 RX ORDER — LEVOCETIRIZINE DIHYDROCHLORIDE 5 MG/1
5 TABLET, FILM COATED ORAL EVERY EVENING
COMMUNITY

## 2021-10-05 RX ORDER — ATORVASTATIN CALCIUM 40 MG/1
40 TABLET, FILM COATED ORAL DAILY
COMMUNITY

## 2021-10-05 NOTE — PROGRESS NOTES
Anusha Licea MD  Diana Alfaro  1965  10/05/2021    Patient Active Problem List   Diagnosis   • Essential hypertension   • Hyperlipidemia   • Precordial pain   • Drug therapy changed   • Chest pain   • Cervical spondylosis with myelopathy   • Neurogenic bladder   • Club foot       Dear Anusha Licea MD:    Subjective     Diana Alfaro is a 55 y.o. female with the problems as listed above, presents    Chief complaint: Follow-up of history of chest pains.    History of Present Illness: Ms. Alfaro is a pleasant 55-year-old  female with history of chest pains for which she underwent evaluation with a stress echo study in December 2020 which revealed no evidence of myocardial ischemia by echocardiographic criteria.  She is here for regular cardiology follow-up.  On today's visit she denies any complaints of chest pains.  She brought several blood pressure readings from home which seem to be all in normal range ranging from 110s to up to 130s over 80s.    Diana Alfaro  Stress Echocardiogram  Order# [id number]  Reading physician: oCrnell Perkins MD Ordering physician: Anusha Licea MD Study date: 12/22/20     Interpretation Summary    · Stress Procedure:  · Pharmacological stress test was performed using dobutamine without low-level exercise.  · Patient denied any chest discomfort during infusion  · There was no ST segment deviation noted during stress.  · There were no significant arrhythmias noted during stress  · No ECG evidence of myocardial ischemia  · Findings consistent with a normal ECG stress test.  · Echocardiogram Findings:  · Normal left ventricular cavity size and wall thickness noted. All left ventricular wall segments contract normally.  · Left ventricular ejection fraction appears to be 61 - 65%.  · The aortic valve is not well visualized. No aortic valve regurgitation or stenosis is present. The aortic valve is grossly normal in structure.  · The mitral valve is  structurally normal with no regurgitation or significant stenosis present.  · There is no evidence of pericardial effusion. .  · Stress Echo Findings:  · Segment augmentation had a normal response to stress  · Cavity size behaved normally in response to stress. Left ventricular function is normal. Septal wall motion is normal.  · Normal stress echo with no significant echocardiographic evidence for myocardial ischemia.          Allergies   Allergen Reactions   • Amlodipine Unknown - Low Severity   • Beta Adrenergic Blockers Other (See Comments)     bradycardia   :      Current Outpatient Medications:   •  atorvastatin (LIPITOR) 40 MG tablet, Take 40 mg by mouth Daily., Disp: , Rfl:   •  Calcium Carbonate-Vit D-Min (Caltrate 600+D Plus Minerals) 600-800 MG-UNIT chewable tablet, Chew., Disp: , Rfl:   •  CALCIUM-MAG-VIT C-VIT D PO, Take  by mouth., Disp: , Rfl:   •  fluticasone (FLONASE) 50 MCG/ACT nasal spray, 2 sprays into the nostril(s) as directed by provider Daily., Disp: , Rfl:   •  ibuprofen (ADVIL,MOTRIN) 800 MG tablet, Take 800 mg by mouth Every 6 (Six) Hours As Needed for Mild Pain ., Disp: , Rfl:   •  ketotifen (ZADITOR) 0.025 % ophthalmic solution, 1 drop 2 (Two) Times a Day., Disp: , Rfl:   •  levocetirizine (XYZAL) 5 MG tablet, Take 5 mg by mouth Every Evening., Disp: , Rfl:   •  losartan (COZAAR) 50 MG tablet, Take 1 tablet by mouth 2 (Two) Times a Day. (Patient taking differently: Take 50 mg by mouth Daily.), Disp: 60 tablet, Rfl: 5  •  Plant Sterol Stanol-Pantethine (CHOLESTOFF COMPLETE PO), Take 3 tablets by mouth 2 (Two) Times a Day., Disp: , Rfl:   •  vitamin D (ERGOCALCIFEROL) 1.25 MG (26840 UT) capsule capsule, Take 50,000 Units by mouth 1 (One) Time Per Week., Disp: , Rfl:     The following portions of the patient's history were reviewed and updated as appropriate: allergies, current medications, past family history, past medical history, past social history, past surgical history and problem  "list.    Social History     Tobacco Use   • Smoking status: Never Smoker   • Smokeless tobacco: Never Used   Substance Use Topics   • Alcohol use: No   • Drug use: No       Review of Systems   Cardiovascular: Negative for chest pain, leg swelling, palpitations and syncope.   Respiratory: Negative for shortness of breath.    Neurological: Negative for dizziness.       Objective   Vitals:    10/05/21 1412 10/05/21 1414   BP: 177/85 173/79   BP Location: Left arm Left arm   Patient Position: Sitting Sitting   Cuff Size: Adult Adult   Pulse: 81 72   Temp: 97 °F (36.1 °C)    TempSrc: Infrared    SpO2: 98%    Weight: 67.1 kg (148 lb)    Height: 152.4 cm (60\")      Body mass index is 28.9 kg/m².    Constitutional:       Appearance: Well-developed.   Eyes:      Conjunctiva/sclera: Conjunctivae normal.   HENT:      Head: Normocephalic.   Neck:      Thyroid: No thyromegaly.      Vascular: No JVD.      Trachea: No tracheal deviation.   Pulmonary:      Effort: No respiratory distress.      Breath sounds: Normal breath sounds. No wheezing. No rales.   Cardiovascular:      PMI at left midclavicular line. Normal rate. Regular rhythm. Normal S1. Normal S2.      Murmurs: There is no murmur.      No gallop. No click. No rub.   Pulses:     Intact distal pulses.   Edema:     Peripheral edema absent.   Abdominal:      General: Bowel sounds are normal.      Palpations: Abdomen is soft. There is no abdominal mass.      Tenderness: There is no abdominal tenderness.   Musculoskeletal:      Cervical back: Normal range of motion and neck supple. Skin:     General: Skin is warm and dry.   Neurological:      Mental Status: Alert and oriented to person, place, and time.      Cranial Nerves: No cranial nerve deficit.       Lab Results   Component Value Date     07/04/2021    K 4.0 07/04/2021     07/04/2021    CO2 26.1 07/04/2021    BUN 16 07/04/2021    CREATININE 0.74 07/04/2021    GLUCOSE 116 (H) 07/04/2021    CALCIUM 9.7 07/04/2021 "    AST 23 07/04/2021    ALT 23 07/04/2021    ALKPHOS 90 07/04/2021     Lab Results   Component Value Date    CKTOTAL 61 03/05/2018     Lab Results   Component Value Date    WBC 7.91 07/04/2021    HGB 13.5 07/04/2021    HCT 40.5 07/04/2021     07/04/2021     Lab Results   Component Value Date    INR 0.89 (L) 03/04/2018    INR 0.89 05/25/2017     Lab Results   Component Value Date    MG 2.0 07/04/2021     Lab Results   Component Value Date    TSH 0.860 07/04/2021    TRIG 93 03/05/2018    HDL 43 (L) 03/05/2018     (H) 03/05/2018      Lab Results   Component Value Date    BNP 26.0 05/25/2017       Assessment/Plan :   Diagnosis Plan   1. Chest pain, unspecified type, resolved.    2. Essential hypertension, seems to be well controlled at home.         Recommendations:  Since her cardiac status appears to be stable, will see her back in a year.    Return in about 1 year (around 10/5/2022) for or sooner if needed.    As always, Anusha Licea MD  I appreciate very much the opportunity to participate in the cardiovascular care of your patients. Please do not hesitate to call me with any questions with regards to Diana BOYKIN Alfaro's evaluation and management.       With Best Regards,        Cornell Perkins MD, [facility]    Please note that portions of this note were completed with a voice recognition program.

## 2021-10-11 ENCOUNTER — OFFICE VISIT (OUTPATIENT)
Dept: NEUROSURGERY | Facility: CLINIC | Age: 56
End: 2021-10-11

## 2021-10-11 VITALS
SYSTOLIC BLOOD PRESSURE: 172 MMHG | WEIGHT: 147 LBS | TEMPERATURE: 97.5 F | HEIGHT: 60 IN | DIASTOLIC BLOOD PRESSURE: 80 MMHG | BODY MASS INDEX: 28.86 KG/M2

## 2021-10-11 DIAGNOSIS — Q66.01 CONGENITAL TALIPES EQUINOVARUS DEFORMITY OF RIGHT FOOT: Primary | ICD-10-CM

## 2021-10-11 DIAGNOSIS — M47.12 CERVICAL SPONDYLITIC CORD COMPRESSION: ICD-10-CM

## 2021-10-11 DIAGNOSIS — M47.12 CERVICAL SPONDYLOSIS WITH MYELOPATHY: ICD-10-CM

## 2021-10-11 PROCEDURE — 99214 OFFICE O/P EST MOD 30 MIN: CPT | Performed by: NEUROLOGICAL SURGERY

## 2021-10-11 NOTE — PROGRESS NOTES
NAME: RADHA PAZ   DOS: 10/11/2021  : 1965  PCP: Anusha Licea MD    Chief Complaint:    Chief Complaint   Patient presents with   • Cervical spondylitic cord compression     S/P CERVICAL LAMINECTOMY DECOMPRESSION POSTERIOR C3-5       History of Present Illness:  56 y.o. female   Saw this 56-year-old male nourished female in neurosurgical follow-up she is status post cervical laminectomy C3-5 for critical spinal stenosis she had florid myelopathy she is doing well her balance is better strength better than her hands she has occasional low back pain    PMHX  Allergies:  Allergies   Allergen Reactions   • Amlodipine Unknown - Low Severity   • Beta Adrenergic Blockers Other (See Comments)     bradycardia     Medications    Current Outpatient Medications:   •  atorvastatin (LIPITOR) 40 MG tablet, Take 40 mg by mouth Daily., Disp: , Rfl:   •  Calcium Carbonate-Vit D-Min (Caltrate 600+D Plus Minerals) 600-800 MG-UNIT chewable tablet, Chew., Disp: , Rfl:   •  CALCIUM-MAG-VIT C-VIT D PO, Take  by mouth., Disp: , Rfl:   •  fluticasone (FLONASE) 50 MCG/ACT nasal spray, 2 sprays into the nostril(s) as directed by provider Daily., Disp: , Rfl:   •  ibuprofen (ADVIL,MOTRIN) 800 MG tablet, Take 800 mg by mouth Every 6 (Six) Hours As Needed for Mild Pain ., Disp: , Rfl:   •  ketotifen (ZADITOR) 0.025 % ophthalmic solution, 1 drop 2 (Two) Times a Day., Disp: , Rfl:   •  levocetirizine (XYZAL) 5 MG tablet, Take 5 mg by mouth Every Evening., Disp: , Rfl:   •  losartan (COZAAR) 50 MG tablet, Take 1 tablet by mouth 2 (Two) Times a Day. (Patient taking differently: Take 50 mg by mouth Daily.), Disp: 60 tablet, Rfl: 5  •  Plant Sterol Stanol-Pantethine (CHOLESTOFF COMPLETE PO), Take 3 tablets by mouth 2 (Two) Times a Day., Disp: , Rfl:   •  vitamin D (ERGOCALCIFEROL) 1.25 MG (21710 UT) capsule capsule, Take 50,000 Units by mouth 1 (One) Time Per Week., Disp: , Rfl:   Past Medical History:  Past Medical History:    Diagnosis Date   • Arthritis    • Chest pain    • Club foot    • Deviated septum    • Elevated cholesterol    • Environmental allergies    • Gout    • Hyperlipidemia    • Hypertension    • Sinusitis      Past Surgical History:  Past Surgical History:   Procedure Laterality Date   • CERVICAL LAMINECTOMY DECOMPRESSION POSTERIOR Bilateral 6/11/2021    Procedure: CERVICAL LAMINECTOMY DECOMPRESSION POSTERIOR C3-5;  Surgeon: Nacho Sheriff MD;  [facility];  Service: Neurosurgery;  Laterality: Bilateral;   • CHOLECYSTECTOMY     • FOOT SURGERY      s/p clubfoot, R   • SEPTOPLASTY, RESECTION INFERIOR TURBINATES Bilateral 7/18/2018    Procedure: SEPTOPLASTY ONLY;  Surgeon: Lorenzo Ordoñez MD;  Location: [facility] OR;  Service: ENT     Social Hx:  Social History     Tobacco Use   • Smoking status: Never Smoker   • Smokeless tobacco: Never Used   Substance Use Topics   • Alcohol use: No   • Drug use: No     Family Hx:  Family History   Problem Relation Age of Onset   • Heart attack Mother    • Heart attack Father    • Heart attack Maternal Aunt    • Heart attack Maternal Uncle    • Heart attack Paternal Aunt    • Heart attack Paternal Uncle    • Heart attack Maternal Grandmother    • Heart attack Maternal Grandfather    • Heart attack Paternal Grandmother    • Heart attack Paternal Grandfather      Review of Systems:        Review of Systems   Constitutional: Negative for activity change, appetite change, chills, diaphoresis, fatigue, fever and unexpected weight change.   HENT: Negative for congestion, dental problem, drooling, ear discharge, ear pain, facial swelling, hearing loss, mouth sores, nosebleeds, postnasal drip, rhinorrhea, sinus pressure, sinus pain, sneezing, sore throat, tinnitus, trouble swallowing and voice change.    Eyes: Negative for photophobia, pain, discharge, redness, itching and visual disturbance.   Respiratory: Negative for apnea, cough, choking, chest tightness, shortness of breath,  wheezing and stridor.    Cardiovascular: Negative for chest pain, palpitations and leg swelling.   Gastrointestinal: Negative for abdominal distention, abdominal pain, anal bleeding, blood in stool, constipation, diarrhea, nausea, rectal pain and vomiting.   Endocrine: Negative for cold intolerance, heat intolerance, polydipsia, polyphagia and polyuria.   Genitourinary: Negative for decreased urine volume, difficulty urinating, dyspareunia, dysuria, enuresis, flank pain, frequency, genital sores, hematuria, menstrual problem, pelvic pain, urgency, vaginal bleeding, vaginal discharge and vaginal pain.   Musculoskeletal: Positive for back pain, gait problem and neck stiffness. Negative for arthralgias, joint swelling, myalgias and neck pain.   Skin: Negative for color change, pallor, rash and wound.   Allergic/Immunologic: Negative for environmental allergies, food allergies and immunocompromised state.   Neurological: Positive for headaches. Negative for dizziness, tremors, seizures, syncope, facial asymmetry, speech difficulty, weakness, light-headedness and numbness.   Hematological: Negative for adenopathy. Does not bruise/bleed easily.   Psychiatric/Behavioral: Negative for agitation, behavioral problems, confusion, decreased concentration, dysphoric mood, hallucinations, self-injury, sleep disturbance and suicidal ideas. The patient is not nervous/anxious and is not hyperactive.             Physical Examination:  Vitals:    10/11/21 1422   BP: 172/80   Temp: 97.5 °F (36.4 °C)      General Appearance:   Well developed, well nourished, well groomed, alert, and cooperative.  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 Alfaro is a 56 y.o. female   Status post myelopathic surgery follow-up she is 4 months out she is doing better she is improved    Her MRI of the lumbar spine was reviewed.  Right now would hold off she can be scheduled for lumbar epidural steroid block should she have the need to manage her pain nonsurgically    I talked about fall risk precautions and signs and symptoms to look for to necessitate a referral back    I will see her back as needed.

## 2022-05-03 ENCOUNTER — TELEPHONE (OUTPATIENT)
Dept: NEUROSURGERY | Facility: CLINIC | Age: 57
End: 2022-05-03

## 2022-05-03 NOTE — TELEPHONE ENCOUNTER
Provider: Jaziel  Caller: Diana Alfaro   Time of call: 12:14 PM       Phone #: [phone]  Surgery: Surgery with Nacho Sheriff MD (06/11/2021)       Last visit:Office Visit with Nacho Sheriff MD (10/11/2021)       Next visit: TBD        Reason for call:  See encounter Below        Pt states her back pain has not gotten worse but has not gotten better and is requesting XR's of her complete spine. Pt denies any worsening pain or urgent issues. Please advise.

## 2022-05-03 NOTE — TELEPHONE ENCOUNTER
S/w patient and relayed PA's message. I let her know that a  will call to schedule an appointment with a PA. No imaging needed.

## 2022-05-03 NOTE — TELEPHONE ENCOUNTER
Cervical myelopathy with her diagnosis    Per Dr. Sheriff's last office note, he recommended nonsurgical treatment of her back to include physical therapy and interventional pain management if indicated.  I do not see a need to order x-rays at this time.  She can see a PA in office or televisit for evaluation

## 2022-05-03 NOTE — TELEPHONE ENCOUNTER
PT CALLED BACK ABOUT XRAYS AND BEING ABLE TO SEE NAYELI GOEL ALL IN ONE DAY.      PER OFFICE CHECKING ON XRAYS DETAILS AND CALL WILL BE MADE BACK TO PT

## 2022-05-03 NOTE — TELEPHONE ENCOUNTER
Provider: MANASA    Caller: RADHA    Relationship to Patient: SELF    Phone Number: [phone]    Reason for Call: PT IS ASKING FOR A MEDICAL TERM THAT CAN BE PUT ON A MEDICAL ALERT BRACTLET OR NECKLACE IF PT IS EVER IN A SITUATIONS THAT WOULD REQUIRE THIS INFO.    LAST OFFICE VISIT 10/11/2021,  SURGERY 6/11/2021.    PT IS ASKING ABOUT DOING A FOLLOW UP ON LOW BACK?  CERVICAL SURGERY 6/11/2021, PT STATES WAS TOLD TO HAVE CERVICAL TAKEN CARE OF FIRST THEN COME BACK ABOUT THE LOW BACK.      PLEASE CONTACT PT TO ADVISE ON THIS QUESTION.

## 2022-07-08 ENCOUNTER — TRANSCRIBE ORDERS (OUTPATIENT)
Dept: ADMINISTRATIVE | Facility: HOSPITAL | Age: 57
End: 2022-07-08

## 2022-07-08 DIAGNOSIS — H90.41 SENSORINEURAL HEARING LOSS, UNILATERAL, RIGHT EAR, WITH UNRESTRICTED HEARING ON THE CONTRALATERAL SIDE: ICD-10-CM

## 2022-07-08 DIAGNOSIS — R42 DIZZINESS: Primary | ICD-10-CM

## 2022-07-27 ENCOUNTER — APPOINTMENT (OUTPATIENT)
Dept: MRI IMAGING | Facility: HOSPITAL | Age: 57
End: 2022-07-27

## 2022-08-10 NOTE — TELEPHONE ENCOUNTER
"PATIENT CALLED TO RESCHEDULE AGAIN; HAD CANCELED SCHEDULED 2X MRI'S FOR 7/27/22; IS NOW EXPRESSING SEVERE CLINICAL PAIN/SYMPTOMS FOR NECK AND LUMBAR; REQUESTING MRI CSPINE AND LSPINE TO BE ORDERED PRIOR TO APPT.     ASKED REASON FOR CANCELLATION, PATIENT STATES \"BECAUSE I CAN'T RIDE IN A CAR THAT LONG DUE TO THE BACK PAIN.\"    ADVISED THAT'S WHY WE'RE NEEDING TO SEE HER; TO ASSESS AND DEVELOP A PLAN OF CARE, IS THERE ANY WAY SHE CAN KEEP APPT, STATED NO.     W/T TO [facility] IN CLINICAL TO SEE IF TELEVISIT IS A POSSIBILITY D/T DISTANCE AND BECAUSE PT HAS RESCHEDULED AND CANCELED MULTIPLE TIMES WITHIN 24 HOUR/SAME DAY.  "

## 2022-10-04 ENCOUNTER — OFFICE VISIT (OUTPATIENT)
Dept: CARDIOLOGY | Facility: CLINIC | Age: 57
End: 2022-10-04

## 2022-10-04 VITALS
SYSTOLIC BLOOD PRESSURE: 148 MMHG | HEIGHT: 60 IN | BODY MASS INDEX: 26.82 KG/M2 | DIASTOLIC BLOOD PRESSURE: 79 MMHG | WEIGHT: 136.6 LBS | HEART RATE: 76 BPM | RESPIRATION RATE: 16 BRPM

## 2022-10-04 DIAGNOSIS — I10 ESSENTIAL HYPERTENSION: ICD-10-CM

## 2022-10-04 DIAGNOSIS — R07.9 CHEST PAIN, UNSPECIFIED TYPE: Primary | ICD-10-CM

## 2022-10-04 PROCEDURE — 99213 OFFICE O/P EST LOW 20 MIN: CPT | Performed by: INTERNAL MEDICINE

## 2022-10-04 PROCEDURE — 93000 ELECTROCARDIOGRAM COMPLETE: CPT | Performed by: INTERNAL MEDICINE

## 2022-10-04 RX ORDER — LORATADINE 10 MG/1
10 CAPSULE, LIQUID FILLED ORAL DAILY
COMMUNITY

## 2022-10-04 NOTE — PROGRESS NOTES
Anusha Licea MD  Diana Alfaro  1965  10/04/2022    Patient Active Problem List   Diagnosis   • Essential hypertension   • Hyperlipidemia   • Precordial pain   • Drug therapy changed   • Chest pain   • Cervical spondylosis with myelopathy   • Neurogenic bladder   • Club foot       Dear Anusha Licea MD:    Subjective     Diana Alfaro is a 56 y.o. female with the problems as listed above, presents    Chief complaint: Follow-up of chest pains.    History of Present Illness: Ms. Alfaro is a pleasant 56-year-old  female with history of chest pains for which she underwent evaluation with stress echo study in December 2020 which revealed no evidence of myocardial ischemia.  She is here for regular cardiology follow-up.  On today's visit she denies any complaints of chest pains or shortness of breath and overall feels all right.  She recently had COVID the first week of August from which she has been recovering at home.    Allergies   Allergen Reactions   • Amlodipine Unknown - Low Severity   • Beta Adrenergic Blockers Other (See Comments)     bradycardia   :    Current Outpatient Medications:   •  atorvastatin (LIPITOR) 40 MG tablet, Take 40 mg by mouth Daily., Disp: , Rfl:   •  Calcium Carbonate-Vit D-Min (Caltrate 600+D Plus Minerals) 600-800 MG-UNIT chewable tablet, Chew., Disp: , Rfl:   •  CALCIUM-MAG-VIT C-VIT D PO, Take  by mouth., Disp: , Rfl:   •  fluticasone (FLONASE) 50 MCG/ACT nasal spray, 2 sprays into the nostril(s) as directed by provider Daily., Disp: , Rfl:   •  ibuprofen (ADVIL,MOTRIN) 800 MG tablet, Take 800 mg by mouth Every 6 (Six) Hours As Needed for Mild Pain ., Disp: , Rfl:   •  ketotifen (ZADITOR) 0.025 % ophthalmic solution, 1 drop 2 (Two) Times a Day., Disp: , Rfl:   •  losartan (COZAAR) 50 MG tablet, Take 1 tablet by mouth 2 (Two) Times a Day. (Patient taking differently: Take 50 mg by mouth Daily.), Disp: 60 tablet, Rfl: 5  •  vitamin D (ERGOCALCIFEROL) 1.25 MG  "(33588 UT) capsule capsule, Take 50,000 Units by mouth 1 (One) Time Per Week., Disp: , Rfl:   •  levocetirizine (XYZAL) 5 MG tablet, Take 5 mg by mouth Every Evening., Disp: , Rfl:   •  Loratadine 10 MG capsule, Take 10 mg by mouth Daily., Disp: , Rfl:   •  Plant Sterol Stanol-Pantethine (CHOLESTOFF COMPLETE PO), Take 3 tablets by mouth 2 (Two) Times a Day., Disp: , Rfl:       The following portions of the patient's history were reviewed and updated as appropriate: allergies, current medications, past family history, past medical history, past social history, past surgical history and problem list.    Social History     Tobacco Use   • Smoking status: Never Smoker   • Smokeless tobacco: Never Used   Substance Use Topics   • Alcohol use: No   • Drug use: No       Review of Systems   Cardiovascular: Negative for chest pain, leg swelling and palpitations.   Respiratory: Negative for shortness of breath.      Objective   Vitals:    10/04/22 1229 10/04/22 1230   BP: 170/81 148/79   BP Location:  Left arm   Pulse: 81 76   Resp: 16    Weight: 62 kg (136 lb 9.6 oz)    Height: 152.4 cm (60\")      Body mass index is 26.68 kg/m².    Vitals reviewed.   Constitutional:       Appearance: Well-developed.   Eyes:      Conjunctiva/sclera: Conjunctivae normal.   HENT:      Head: Normocephalic.   Neck:      Thyroid: No thyromegaly.      Vascular: No JVD.      Trachea: No tracheal deviation.   Pulmonary:      Effort: No respiratory distress.      Breath sounds: Normal breath sounds. No wheezing. No rales.   Cardiovascular:      PMI at left midclavicular line. Normal rate. Regular rhythm. Normal S1. Normal S2.      Murmurs: There is no murmur.      No gallop. No click. No rub.   Pulses:     Intact distal pulses.   Edema:     Peripheral edema absent.   Abdominal:      General: Bowel sounds are normal.      Palpations: Abdomen is soft. There is no abdominal mass.      Tenderness: There is no abdominal tenderness.   Musculoskeletal:      " Cervical back: Normal range of motion and neck supple. Skin:     General: Skin is warm and dry.   Neurological:      Mental Status: Alert and oriented to person, place, and time.      Cranial Nerves: No cranial nerve deficit.         Lab Results   Component Value Date     07/04/2021    K 4.0 07/04/2021     07/04/2021    CO2 26.1 07/04/2021    BUN 16 07/04/2021    CREATININE 0.74 07/04/2021    GLUCOSE 116 (H) 07/04/2021    CALCIUM 9.7 07/04/2021    AST 23 07/04/2021    ALT 23 07/04/2021    ALKPHOS 90 07/04/2021     Lab Results   Component Value Date    CKTOTAL 61 03/05/2018     Lab Results   Component Value Date    WBC 7.91 07/04/2021    HGB 13.5 07/04/2021    HCT 40.5 07/04/2021     07/04/2021     Lab Results   Component Value Date    INR 0.89 (L) 03/04/2018    INR 0.89 05/25/2017     Lab Results   Component Value Date    MG 2.0 07/04/2021     Lab Results   Component Value Date    TSH 0.860 07/04/2021    TRIG 93 03/05/2018    HDL 43 (L) 03/05/2018     (H) 03/05/2018      Lab Results   Component Value Date    BNP 26.0 05/25/2017         ECG 12 Lead    Date/Time: 10/4/2022 12:29 PM  Performed by: Cornell Perkins MD  Authorized by: Cornell Perkins MD   Comparison: compared with previous ECG from 7/4/2021  Similar to previous ECG  Rhythm: sinus rhythm  Conduction: conduction normal  ST Segments: ST segments normal  T Waves: T waves normal    Clinical impression: normal ECG            Assessment & Plan :   Diagnosis Plan   1. Chest pain, unspecified type     2. Essential hypertension         Recommendations:  I have asked her to keep a check on the blood pressure at home twice a day and take it to next provider's visit.    Return in about 1 year (around 10/4/2023).    As always, Anusha Licea MD  I appreciate very much the opportunity to participate in the cardiovascular care of your patients. Please do not hesitate to call me with any questions with regards to Diana Alfaro  evaluation and management.       With Best Regards,        Cornell Perkins MD, [facility]    Please note that portions of this note were completed with a voice recognition program.

## 2022-11-17 ENCOUNTER — OFFICE VISIT (OUTPATIENT)
Dept: NEUROSURGERY | Facility: CLINIC | Age: 57
End: 2022-11-17

## 2022-11-17 VITALS — WEIGHT: 139.2 LBS | TEMPERATURE: 97.5 F | BODY MASS INDEX: 27.33 KG/M2 | HEIGHT: 60 IN

## 2022-11-17 DIAGNOSIS — R26.9 ABNORMALITY OF GAIT: Primary | ICD-10-CM

## 2022-11-17 PROCEDURE — 99214 OFFICE O/P EST MOD 30 MIN: CPT | Performed by: PHYSICIAN ASSISTANT

## 2022-11-17 RX ORDER — KETOROLAC TROMETHAMINE 5 MG/ML
1 SOLUTION OPHTHALMIC 4 TIMES DAILY
COMMUNITY

## 2022-11-17 NOTE — PROGRESS NOTES
Patient: Diana Alfaro  : 1965    Primary Care Provider: Anusha Licea MD      Chief Complaint: Gait discoordination    History of Present Illness:       Patient is a nice 57-year-old female known to the neurosurgical practice for being referred to us secondary to back and lower extremity cramps and difficulty with gait.    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  Patient was referred with a lumbar MRI but was determined to have a spinal cord injury secondary to evaluation by one of our PAs.  Patient was sent for an MRI and came in with a severely tight cervical spine with myelomalacia and spinal cord bruising there required urgent surgery.    Posterior cervical laminectomy was done by Dr. Sheriff on 2021.    Patient is doing exceedingly well and is having better coordination and full strength in her extremities which shows quite improvement.  Patient actually is getting around a lot better but wants to check on her lumbar spine    Review of Systems   Constitutional: Negative for activity change, appetite change, chills, diaphoresis, fatigue, fever and unexpected weight change.   HENT: Negative for congestion, dental problem, drooling, ear discharge, ear pain, facial swelling, hearing loss, mouth sores, nosebleeds, postnasal drip, rhinorrhea, sinus pressure, sinus pain, sneezing, sore throat, tinnitus, trouble swallowing and voice change.    Eyes: Negative for photophobia, pain, discharge, redness, itching and visual disturbance.   Respiratory: Negative for apnea, cough, choking, chest tightness, shortness of breath, wheezing and stridor.    Cardiovascular: Negative for chest pain, palpitations and leg swelling.   Gastrointestinal: Negative for abdominal distention, abdominal pain, anal bleeding, blood in stool, constipation, diarrhea, nausea, rectal pain and vomiting.   Endocrine: Negative for cold intolerance, heat intolerance, polydipsia, polyphagia and  polyuria.   Genitourinary: Negative for decreased urine volume, difficulty urinating, dysuria, enuresis, flank pain, frequency, genital sores, hematuria and urgency.   Musculoskeletal: Positive for back pain. Negative for arthralgias, gait problem, joint swelling, myalgias, neck pain and neck stiffness.   Skin: Negative for color change, pallor, rash and wound.   Allergic/Immunologic: Negative for environmental allergies, food allergies and immunocompromised state.   Neurological: Negative for dizziness, tremors, seizures, syncope, facial asymmetry, speech difficulty, weakness, light-headedness, numbness and headaches.   Hematological: Negative for adenopathy. Does not bruise/bleed easily.   Psychiatric/Behavioral: Negative for agitation, behavioral problems, confusion, decreased concentration, dysphoric mood, hallucinations, self-injury, sleep disturbance and suicidal ideas. The patient is not nervous/anxious and is not hyperactive.    All other systems reviewed and are negative.      Past Medical History:     Past Medical History:   Diagnosis Date   • Arthritis    • Chest pain    • Club foot    • Deviated septum    • Elevated cholesterol    • Environmental allergies    • Gout    • Hyperlipidemia    • Hypertension    • Sinusitis        Family History:     Family History   Problem Relation Age of Onset   • Heart attack Mother    • Heart attack Father    • Heart attack Maternal Aunt    • Heart attack Maternal Uncle    • Heart attack Paternal Aunt    • Heart attack Paternal Uncle    • Heart attack Maternal Grandmother    • Heart attack Maternal Grandfather    • Heart attack Paternal Grandmother    • Heart attack Paternal Grandfather        Social History:    reports that she has never smoked. She has never used smokeless tobacco. She reports that she does not drink alcohol and does not use drugs.   SMOKING STATUS: Non-smoker    Surgical History:     Past Surgical History:   Procedure Laterality Date   • CERVICAL  "LAMINECTOMY DECOMPRESSION POSTERIOR Bilateral 6/11/2021    Procedure: CERVICAL LAMINECTOMY DECOMPRESSION POSTERIOR C3-5;  Surgeon: Nacho Sheriff MD;  Location: [facility] OR;  Service: Neurosurgery;  Laterality: Bilateral;   • CHOLECYSTECTOMY     • FOOT SURGERY      s/p clubfoot, R   • SEPTOPLASTY, RESECTION INFERIOR TURBINATES Bilateral 7/18/2018    Procedure: SEPTOPLASTY ONLY;  Surgeon: Lorenzo Ordoñez MD;  Location: [facility];  Service: ENT       Allergies:   Amlodipine and Beta adrenergic blockers    Physical Exam:    Vital Signs:Temp 97.5 °F (36.4 °C) (Infrared)   Ht 152.4 cm (60\")   Wt 63.1 kg (139 lb 3.2 oz)   BMI 27.19 kg/m²    BMI: Body mass index is 27.19 kg/m².     GENERAL:           The patient is in no acute distress, and is able to answer all questions appropriately.    Neck:          Supple without lymphadenopathy    Cardiovascular:       Peripheral pulses 2+ at dorsalis pedis and posterior tibialis    Lungs:         Breathing unlabored    Musculoskeletal:            strength is 5 out of 5 bilaterally.        Shoulder abduction is 5 out of 5.         Dorsiflexion is 5/5 Bilaterally       Plantarflexion is 5/5 bilaterally       Hip Flexion 5/5 bilaterally.         The patient´s gait spastic with obvious leg length discrepancy    Neurologic:          The patient is alert and oriented by 3.          Pupils are equal and reactive to light.         Visual fields are full.         Extraocular movements are intact without nystagmus.         There is no evidence of central motor drift. No facial droop.  No difficulty with rapid alternating movements.         Sensation is equal bilaterally with no deficit.           Reflexes:  3+ through out  Bilateral Jamey's noted no clonus on left foot right foot clubbed    CRANIAL NERVES:         Deferred secondary to COVID mask    Medical Decision Making    Data Review:   MRI of the cervical spine reviewed once again with the patient showing a preoperative " spinal cord bruise.    Old lumbar MRI reviewed showing mild to moderate stenosis nothing of significance    Diagnosis:   Gait imbalance cervical myelopathy  Low back pain    Treatment Options:   Secondary to her low back pain we will get an MRI and give her a telephone visit thereafter.  I discussed with her and her friend that this would not likely lead to any intervention secondary to her symptoms actually being quite a bit better after the cervical surgery.  I do think that the majority of her symptoms were from cervical myelopathy.  BMI is >= 25 and <30. (Overweight) The following options were offered after discussion;: weight loss educational material (shared in after visit summary)     Diagnosis Plan   1. Abnormality of gait  MRI Lumbar Spine Without Contrast

## 2022-12-08 ENCOUNTER — HOSPITAL ENCOUNTER (OUTPATIENT)
Dept: MRI IMAGING | Facility: HOSPITAL | Age: 57
Discharge: HOME | End: 2022-12-08
Admitting: PHYSICIAN ASSISTANT

## 2022-12-08 DIAGNOSIS — R26.9 ABNORMALITY OF GAIT: ICD-10-CM

## 2022-12-08 PROCEDURE — 72148 MRI LUMBAR SPINE W/O DYE: CPT | Performed by: RADIOLOGY

## 2022-12-08 PROCEDURE — 72148 MRI LUMBAR SPINE W/O DYE: CPT

## 2022-12-09 ENCOUNTER — TELEPHONE (OUTPATIENT)
Dept: NEUROSURGERY | Facility: CLINIC | Age: 57
End: 2022-12-09

## 2022-12-09 NOTE — TELEPHONE ENCOUNTER
Caller: Diana Alfaro    Relationship: Self    Best call back number: [phone]    What form or medical record are you requesting:OFFICE NOTES,SURGERY REPORT AND IMAGING    Who is requesting this form or medical record from you: PATIENT    How would you like to receive the form or medical records (pick-up, mail, fax):MAIL  If fax, what is the fax number:  If mail, what is the address: [address] [facility] [address]  If pick-up, provide patient with address and location details    Timeframe paperwork needed:ASAP    Additional notes:PT CALLED AND STATES THAT SHE SPOKE TO SOMEONE IN THE OFFICE WHO ADVISED THEY WOULD SEND HER THE PAPERWORK-PT STATES THAT SHE HAS NOT RECEIVED ANYTHING-SENDING REQUEST TO THE OFFICE FOR ABOVE RECORDS THANK YOU!

## 2022-12-12 ENCOUNTER — OFFICE VISIT (OUTPATIENT)
Dept: NEUROSURGERY | Facility: CLINIC | Age: 57
End: 2022-12-12

## 2022-12-12 DIAGNOSIS — G89.29 CHRONIC LOW BACK PAIN, UNSPECIFIED BACK PAIN LATERALITY, UNSPECIFIED WHETHER SCIATICA PRESENT: ICD-10-CM

## 2022-12-12 DIAGNOSIS — M54.50 CHRONIC LOW BACK PAIN, UNSPECIFIED BACK PAIN LATERALITY, UNSPECIFIED WHETHER SCIATICA PRESENT: ICD-10-CM

## 2022-12-12 DIAGNOSIS — M47.12 CERVICAL SPONDYLOSIS WITH MYELOPATHY: Primary | ICD-10-CM

## 2022-12-12 PROCEDURE — 99214 OFFICE O/P EST MOD 30 MIN: CPT | Performed by: PHYSICIAN ASSISTANT

## 2022-12-12 NOTE — PROGRESS NOTES
You have chosen to receive care through a telephone visit. Do you consent to use a telephone visit for your medical care today? Yes    15 minutes    Patient: Diana Alfaro  : 1965    Primary Care Provider: Anusha Licea MD      Chief Complaint: MRI follow-up    History of Present Illness:        Patient is a nice 57-year-old female known to the neurosurgical practice for being referred to us secondary to back and lower extremity cramps and difficulty with gait.     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  Patient was referred with a lumbar MRI but was determined to have a spinal cord injury secondary to evaluation by one of our PAs.  Patient was sent for an MRI and came in with a severely tight cervical spine with myelomalacia and spinal cord bruising there required urgent surgery.    Patient is called today with results of the MRI.  MRI shows diffuse degenerative changes throughout lumbar spine.    Review of Systems   Constitutional: Negative for activity change, appetite change, chills, fatigue and fever.   HENT: Negative for congestion, dental problem, ear pain, hearing loss, sinus pressure and tinnitus.    Eyes: Negative for pain and redness.   Respiratory: Negative for apnea, cough, shortness of breath and wheezing.    Cardiovascular: Negative for chest pain, palpitations and leg swelling.   Gastrointestinal: Negative for abdominal distention, abdominal pain, blood in stool, constipation, diarrhea, nausea and vomiting.   Endocrine: Negative for cold intolerance, heat intolerance and polyuria.   Genitourinary: Negative for enuresis, frequency and urgency.   Musculoskeletal: Positive for back pain.   Skin: Negative for color change and rash.   Neurological: Negative for dizziness, tremors, seizures, syncope, speech difficulty, weakness, light-headedness, numbness and headaches.   Psychiatric/Behavioral: Negative for behavioral problems and confusion. The  patient is not nervous/anxious.        Past Medical History:     Past Medical History:   Diagnosis Date   • Arthritis    • Chest pain    • Club foot    • Deviated septum    • Elevated cholesterol    • Environmental allergies    • Gout    • Hyperlipidemia    • Hypertension    • Sinusitis        Family History:     Family History   Problem Relation Age of Onset   • Heart attack Mother    • Heart attack Father    • Heart attack Maternal Aunt    • Heart attack Maternal Uncle    • Heart attack Paternal Aunt    • Heart attack Paternal Uncle    • Heart attack Maternal Grandmother    • Heart attack Maternal Grandfather    • Heart attack Paternal Grandmother    • Heart attack Paternal Grandfather        Social History:    reports that she has never smoked. She has never used smokeless tobacco. She reports that she does not drink alcohol and does not use drugs.   SMOKING STATUS: Non-smoker    Surgical History:     Past Surgical History:   Procedure Laterality Date   • CERVICAL LAMINECTOMY DECOMPRESSION POSTERIOR Bilateral 6/11/2021    Procedure: CERVICAL LAMINECTOMY DECOMPRESSION POSTERIOR C3-5;  Surgeon: Nacho Sheriff MD;  Location: [facility];  Service: Neurosurgery;  Laterality: Bilateral;   • CHOLECYSTECTOMY     • FOOT SURGERY      s/p clubfoot, R   • SEPTOPLASTY, RESECTION INFERIOR TURBINATES Bilateral 7/18/2018    Procedure: SEPTOPLASTY ONLY;  Surgeon: Lorenzo Ordoñez MD;  Location: [facility];  Service: ENT       Allergies:   Amlodipine and Beta adrenergic blockers    Physical Exam:    Vital Signs:There were no vitals taken for this visit.   BMI: There is no height or weight on file to calculate BMI.     Exam limited secondary to telephone encounter    Medical Decision Making    Data Review:   MRI of the lumbar spine reviewed showing degenerative changes throughout the mid to lower lumbar area.    Diagnosis:   Low back pain  History of cervical myelopathy  Status post ACDF    Treatment Options:   Patient is  doing well will follow-up with us on an as-needed basis.    BMI is >= 25 and <30. (Overweight) The following options were offered after discussion;: weight loss educational material (shared in after visit summary)    No diagnosis found.

## 2023-08-09 ENCOUNTER — TELEPHONE (OUTPATIENT)
Dept: CARDIOLOGY | Facility: CLINIC | Age: 58
End: 2023-08-09

## 2023-08-09 NOTE — TELEPHONE ENCOUNTER
Caller: BAIRON [facility]LSON OFFICE    Relationship: Provider    Best call back number: [phone]     What is the best time to reach you: 8:30-7:00    What was the call regarding: PTS PCP WANTS THIS PT TO GET A CHEMICAL ECHO, CAN NOT  WALK ON [facility]. PLEASE ADVISE, CALLER AND I NOT SURE ABOUT A CHEM ECHO.       PCP ALSO WANTS THE PT TO BE SEEN SOONER THAN 10/3/23 DUE TO ELEVATED BLOOD PRESSURE

## 2023-08-09 NOTE — TELEPHONE ENCOUNTER
HUB CAN READ  Called pt to see if we could get her a sooner appointment, but pt refused to see a midlevel provider so I was able to move her appointment with  from Oct. 3rd to Sept 27th @ 11:30.

## 2023-08-14 NOTE — TELEPHONE ENCOUNTER
Caller: BAIRON CARLSON OFFICE    Relationship: Provider    Best call back number: [phone]     What is the best time to reach you: 8:30-5:00    What was the call regarding: BAIRON CALLED IN TO SEE IF THIS PT HAS BEEN MOVED UP, AND I TOLD HER THE NEW DATE. SHE WOULD LIKE TO KNOW THE STATUS OF THE ECHO. PLEASE CALL AND ADVISE.

## 2023-09-27 ENCOUNTER — OFFICE VISIT (OUTPATIENT)
Dept: CARDIOLOGY | Facility: CLINIC | Age: 58
End: 2023-09-27
Payer: COMMERCIAL

## 2023-09-27 VITALS
HEART RATE: 75 BPM | WEIGHT: 136.4 LBS | DIASTOLIC BLOOD PRESSURE: 84 MMHG | BODY MASS INDEX: 26.78 KG/M2 | OXYGEN SATURATION: 99 % | SYSTOLIC BLOOD PRESSURE: 180 MMHG | HEIGHT: 60 IN

## 2023-09-27 DIAGNOSIS — I10 ESSENTIAL HYPERTENSION: ICD-10-CM

## 2023-09-27 DIAGNOSIS — R07.9 CHEST PAIN, UNSPECIFIED TYPE: Primary | ICD-10-CM

## 2023-09-27 PROCEDURE — 93000 ELECTROCARDIOGRAM COMPLETE: CPT | Performed by: INTERNAL MEDICINE

## 2023-09-27 PROCEDURE — 3077F SYST BP >= 140 MM HG: CPT | Performed by: INTERNAL MEDICINE

## 2023-09-27 PROCEDURE — 3079F DIAST BP 80-89 MM HG: CPT | Performed by: INTERNAL MEDICINE

## 2023-09-27 PROCEDURE — 99213 OFFICE O/P EST LOW 20 MIN: CPT | Performed by: INTERNAL MEDICINE

## 2023-09-27 NOTE — PROGRESS NOTES
Anusha Licea MD  Diana Alfaro  1965  09/27/2023    Patient Active Problem List   Diagnosis    Essential hypertension    Hyperlipidemia    Precordial pain    Drug therapy changed    Chest pain    Cervical spondylosis with myelopathy    Neurogenic bladder    Club foot       Dear Anusha Licea MD:    Subjective     Diana Alfaro is a 57 y.o. female with the problems as listed above, presents    Chief Complaint   Patient presents with    Follow-up     ROUTINE    Requests stress test     Chief complaint: Follow-up of chest pains.    History of Present Illness: Ms. Alfaro is a pleasant 57-year-old  female with no history of known coronary artery disease, has previous history of chest pains for which she underwent a stress echo study in December 2020 which was normal.  She is here for regular cardiology follow-up.  Today's visit she denies any exertional chest pains but has some pains after eating at times.  She denies any significant dyspnea, PND, orthopnea or pedal edema.  She states her blood pressure could be high because she has to drive in the rain and got stuck in the traffic.    Allergies   Allergen Reactions    Amlodipine Unknown - Low Severity    Beta Adrenergic Blockers Other (See Comments)     bradycardia   :    Current Outpatient Medications:     atorvastatin (LIPITOR) 40 MG tablet, Take 1 tablet by mouth Daily., Disp: , Rfl:     Calcium Carbonate-Vit D-Min (Caltrate 600+D Plus Minerals) 600-800 MG-UNIT chewable tablet, Chew., Disp: , Rfl:     CALCIUM-MAG-VIT C-VIT D PO, Take  by mouth., Disp: , Rfl:     fluticasone (FLONASE) 50 MCG/ACT nasal spray, 2 sprays into the nostril(s) as directed by provider Daily., Disp: , Rfl:     ibuprofen (ADVIL,MOTRIN) 800 MG tablet, Take 1 tablet by mouth Every 6 (Six) Hours As Needed for Mild Pain., Disp: , Rfl:     ketorolac (ACULAR) 0.5 % ophthalmic solution, 1 drop 4 (Four) Times a Day., Disp: , Rfl:     ketotifen (ZADITOR) 0.025 %  "ophthalmic solution, 1 drop 2 (Two) Times a Day., Disp: , Rfl:     Loratadine 10 MG capsule, Take 1 capsule by mouth Daily., Disp: , Rfl:     losartan (COZAAR) 50 MG tablet, Take 1 tablet by mouth 2 (Two) Times a Day. (Patient taking differently: Take 1 tablet by mouth Daily.), Disp: 60 tablet, Rfl: 5    Plant Sterol Stanol-Pantethine (CHOLESTOFF COMPLETE PO), Take 3 tablets by mouth 2 (Two) Times a Day., Disp: , Rfl:     The following portions of the patient's history were reviewed and updated as appropriate: allergies, current medications, past family history, past medical history, past social history, past surgical history and problem list.    Social History     Tobacco Use    Smoking status: Never    Smokeless tobacco: Never   Vaping Use    Vaping Use: Never used   Substance Use Topics    Alcohol use: No    Drug use: No     Review of Systems   Constitutional: Negative for chills, fever, malaise/fatigue, weight gain and weight loss.   HENT:  Negative for congestion, nosebleeds and sore throat.    Cardiovascular:  Negative for chest pain, irregular heartbeat, leg swelling and orthopnea.   Respiratory:  Negative for cough, hemoptysis, shortness of breath and wheezing.    Gastrointestinal:  Negative for abdominal pain, change in bowel habit, hematemesis, hematochezia, melena, nausea and vomiting.   Genitourinary:  Negative for dysuria, frequency and hematuria.   Neurological:  Negative for focal weakness, headaches, light-headedness and weakness.   All other systems reviewed and are negative.    Objective   Vitals:    09/27/23 1132   BP: 180/84   Pulse: 75   SpO2: 99%   Weight: 61.9 kg (136 lb 6.4 oz)   Height: 152.4 cm (60\")     Body mass index is 26.64 kg/m².    Vitals reviewed.   Constitutional:       Appearance: Well-developed.   Eyes:      Conjunctiva/sclera: Conjunctivae normal.   HENT:      Head: Normocephalic.   Neck:      Thyroid: No thyromegaly.      Vascular: No JVD.      Trachea: No tracheal deviation. "   Pulmonary:      Effort: No respiratory distress.      Breath sounds: Normal breath sounds. No wheezing. No rales.   Cardiovascular:      PMI at left midclavicular line. Normal rate. Regular rhythm. Normal S1. Normal S2.       Murmurs: There is no murmur.      No gallop.  No click. No rub.   Pulses:     Intact distal pulses.   Edema:     Peripheral edema absent.   Abdominal:      General: Bowel sounds are normal.      Palpations: Abdomen is soft. There is no abdominal mass.      Tenderness: There is no abdominal tenderness.   Musculoskeletal:      Cervical back: Normal range of motion and neck supple. Skin:     General: Skin is warm and dry.   Neurological:      Mental Status: Alert and oriented to person, place, and time.      Cranial Nerves: No cranial nerve deficit.         ECG 12 Lead    Date/Time: 9/27/2023 11:30 AM  Performed by: Cornell Perkins MD  Authorized by: Cornell Perkins MD   Comparison: compared with previous ECG from 10/4/2022  Rhythm: sinus rhythm  Conduction: conduction normal  ST Segments: ST segments normal  T Waves: T waves normal    Clinical impression: normal ECG          Assessment & Plan    Diagnosis Plan   1. Chest pain, unspecified type, resolved.        2. Essential hypertension with elevated systolic blood pressure today.          Recommendations  I have asked her to keep a check on her blood pressure twice a day at home and take it to the next provider's visit to make adjustment in her antihypertensive medications.  Patient expressed understanding.      As always, Anusha Licea MD  I appreciate very much the opportunity to participate in the cardiovascular care of your patients. Please do not hesitate to call me with any questions with regards to Diana Alfaro's evaluation and management.       With Best Regards,        Cornell Perkins MD, [facility]    Please note that portions of this note were completed with a voice recognition program.

## 2024-09-23 ENCOUNTER — OFFICE VISIT (OUTPATIENT)
Dept: NEUROSURGERY | Facility: CLINIC | Age: 59
End: 2024-09-23
Payer: COMMERCIAL

## 2024-09-23 VITALS
TEMPERATURE: 98 F | SYSTOLIC BLOOD PRESSURE: 138 MMHG | WEIGHT: 138 LBS | DIASTOLIC BLOOD PRESSURE: 98 MMHG | HEIGHT: 60 IN | BODY MASS INDEX: 27.09 KG/M2

## 2024-09-23 DIAGNOSIS — M47.12 CERVICAL SPONDYLOSIS WITH MYELOPATHY: Primary | ICD-10-CM

## 2024-09-23 PROCEDURE — 3080F DIAST BP >= 90 MM HG: CPT | Performed by: PHYSICIAN ASSISTANT

## 2024-09-23 PROCEDURE — 3075F SYST BP GE 130 - 139MM HG: CPT | Performed by: PHYSICIAN ASSISTANT

## 2024-09-23 PROCEDURE — 99213 OFFICE O/P EST LOW 20 MIN: CPT | Performed by: PHYSICIAN ASSISTANT

## 2024-09-23 RX ORDER — ERGOCALCIFEROL 1.25 MG/1
CAPSULE, LIQUID FILLED ORAL
COMMUNITY
Start: 2024-09-03

## 2024-09-23 RX ORDER — GUAIFENESIN 200 MG/10ML
100 LIQUID ORAL
COMMUNITY
Start: 2024-07-30

## 2024-09-23 RX ORDER — OFLOXACIN 3 MG/ML
SOLUTION/ DROPS OPHTHALMIC
COMMUNITY

## 2024-09-23 RX ORDER — NEOMYCIN SULFATE, POLYMYXIN B SULFATE, HYDROCORTISONE 3.5; 10000; 1 MG/ML; [USP'U]/ML; MG/ML
SOLUTION/ DROPS AURICULAR (OTIC)
COMMUNITY
Start: 2024-07-30

## 2024-09-23 RX ORDER — CIPROFLOXACIN HYDROCHLORIDE 3.5 MG/ML
SOLUTION/ DROPS TOPICAL
COMMUNITY

## 2024-09-23 RX ORDER — PSEUDOEPHED/ACETAMINOPH/DIPHEN 30MG-500MG
1 TABLET ORAL EVERY 6 HOURS
COMMUNITY
Start: 2024-08-10

## 2024-11-07 ENCOUNTER — OFFICE VISIT (OUTPATIENT)
Dept: NEUROSURGERY | Facility: CLINIC | Age: 59
End: 2024-11-07
Payer: COMMERCIAL

## 2024-11-07 ENCOUNTER — HOSPITAL ENCOUNTER (OUTPATIENT)
Dept: MRI IMAGING | Facility: HOSPITAL | Age: 59
Discharge: HOME | End: 2024-11-07
Admitting: PHYSICIAN ASSISTANT
Payer: COMMERCIAL

## 2024-11-07 VITALS
HEIGHT: 60 IN | DIASTOLIC BLOOD PRESSURE: 80 MMHG | WEIGHT: 139.1 LBS | BODY MASS INDEX: 27.31 KG/M2 | TEMPERATURE: 97.1 F | SYSTOLIC BLOOD PRESSURE: 150 MMHG

## 2024-11-07 DIAGNOSIS — M47.12 CERVICAL SPONDYLOSIS WITH MYELOPATHY: Primary | ICD-10-CM

## 2024-11-07 DIAGNOSIS — M47.12 CERVICAL SPONDYLOSIS WITH MYELOPATHY: ICD-10-CM

## 2024-11-07 PROCEDURE — 72141 MRI NECK SPINE W/O DYE: CPT

## 2024-11-07 PROCEDURE — 99214 OFFICE O/P EST MOD 30 MIN: CPT | Performed by: PHYSICIAN ASSISTANT

## 2024-11-07 PROCEDURE — 3077F SYST BP >= 140 MM HG: CPT | Performed by: PHYSICIAN ASSISTANT

## 2024-11-07 PROCEDURE — 3079F DIAST BP 80-89 MM HG: CPT | Performed by: PHYSICIAN ASSISTANT

## 2024-11-07 NOTE — PROGRESS NOTES
Patient: Diana Alfaro  : 1965    Primary Care Provider: Anusha Licea MD    Chief Complaint: Bilateral hand numbness right hip and knee pain    History of Present Illness:       Patient is a very nice 58-year-old female from [facility] who was initially seen as a consult for low back pain with lower extremity cramps most having issues with gait instability as well.  Patient was noted to have some cervical myelopathy was referred for an MRI.  Patient came in urgently to the emergency department secondary to the severity of her cervical MRI findings and required operation that afternoon.    Patient has showed ongoing improvements with her gait instability that have been confused somewhat with her clubfoot and her prior mobility issue.    Patient notices numbness and tingling in her hands when she overdoes it and also has some numbness and tingling in her feet.    Patient has had no updated imaging up until this visit but the MRI shows a widely decompressed cervical canal with a chronic cord bruise at the previously noted level.    Patient is doing well with rehab right now    Review of Systems   Constitutional:  Negative for activity change, appetite change, chills, diaphoresis, fatigue, fever and unexpected weight change.   HENT:  Negative for congestion, dental problem, drooling, ear discharge, ear pain, facial swelling, hearing loss, mouth sores, nosebleeds, postnasal drip, rhinorrhea, sinus pressure, sinus pain, sneezing, sore throat, tinnitus, trouble swallowing and voice change.    Eyes:  Negative for photophobia, pain, discharge, redness, itching and visual disturbance.   Respiratory:  Negative for apnea, cough, choking, chest tightness, shortness of breath, wheezing and stridor.    Cardiovascular:  Negative for chest pain, palpitations and leg swelling.   Gastrointestinal:  Negative for abdominal distention, abdominal pain, anal bleeding, blood in stool, constipation, diarrhea,  nausea, rectal pain and vomiting.   Endocrine: Negative for cold intolerance, heat intolerance, polydipsia, polyphagia and polyuria.   Genitourinary:  Negative for decreased urine volume, difficulty urinating, dysuria, enuresis, flank pain, frequency, genital sores, hematuria and urgency.   Musculoskeletal:  Positive for arthralgias (right hip pain). Negative for back pain, gait problem, joint swelling, myalgias, neck pain and neck stiffness.   Skin:  Negative for color change, pallor, rash and wound.   Allergic/Immunologic: Negative for environmental allergies, food allergies and immunocompromised state.   Neurological:  Negative for dizziness, tremors, seizures, syncope, facial asymmetry, speech difficulty, weakness, light-headedness, numbness and headaches.   Hematological:  Negative for adenopathy. Does not bruise/bleed easily.   Psychiatric/Behavioral:  Negative for agitation, behavioral problems, confusion, decreased concentration, dysphoric mood, hallucinations, self-injury, sleep disturbance and suicidal ideas. The patient is not nervous/anxious and is not hyperactive.      Past Medical History:     Past Medical History:   Diagnosis Date    Arthritis     Chest pain     Club foot     Deviated septum     Elevated cholesterol     Environmental allergies     Gout     Hyperlipidemia     Hypertension     Sinusitis        Family History:     Family History   Problem Relation Age of Onset    Heart attack Mother     Heart attack Father     Heart attack Maternal Aunt     Heart attack Maternal Uncle     Heart attack Paternal Aunt     Heart attack Paternal Uncle     Heart attack Maternal Grandmother     Heart attack Maternal Grandfather     Heart attack Paternal Grandmother     Heart attack Paternal Grandfather        Social History:    reports that she has never smoked. She has never been exposed to tobacco smoke. She has never used smokeless tobacco. She reports that she does not drink alcohol and does not use  "drugs.   SMOKING STATUS: Non-smoker    Surgical History:     Past Surgical History:   Procedure Laterality Date    CERVICAL LAMINECTOMY DECOMPRESSION POSTERIOR Bilateral 6/11/2021    Procedure: CERVICAL LAMINECTOMY DECOMPRESSION POSTERIOR C3-5;  Surgeon: Nacho Sheriff MD;  Location: [facility] OR;  Service: Neurosurgery;  Laterality: Bilateral;    CHOLECYSTECTOMY      FOOT SURGERY      s/p clubfoot, R    SEPTOPLASTY, RESECTION INFERIOR TURBINATES Bilateral 7/18/2018    Procedure: SEPTOPLASTY ONLY;  Surgeon: Lorenzo Ordoñez MD;  Location: [facility] OR;  Service: ENT       Allergies:   Doxycycline    Physical Exam:    Vital Signs:/80 (BP Location: Right arm, Patient Position: Sitting, Cuff Size: Adult)   Temp 97.1 °F (36.2 °C) (Infrared)   Ht 152.4 cm (60\")   Wt 63.1 kg (139 lb 1.6 oz)   BMI 27.17 kg/m²    BMI: Body mass index is 27.17 kg/m².     GENERAL:   The patient is in no acute distress, and is able to answer all questions appropriately.  Skin:  The incision is well-healed and well approximated.  No signs of infection, bleeding, or erythema.  Musculoskeletal:     strength is 5 out of 5 bilaterally.   Shoulder abduction is 5 out of 5.    Dorsiflexion is 5/5 Bilaterally  Plantarflexion is 5/5 bilaterally  Hip Flexion 5/5 bilaterally.      Neurologic:   The patient is alert and oriented by 3.     Pupils are equal and reactive to light.    Sensation is equal bilaterally with no deficit.      Reflexes:  4+ @ biceps, triceps, brachioradialis, as well as the patellar and Achilles tendon bilaterally.  Santiago's noted with triple adductor sign      Medical Decision Making    Data Review:   MRI of the cervical spine reviewed from preoperative postoperative MRIs from couple years ago.    Spinal canal is widely decompressed but there is still a bruise in the spinal cord at the previously noted C4-5 level    Diagnosis:   Cervical myelopathy  Right clubfoot    Chronic low back pain    Treatment Options: "   Patient is doing well at this point having good luck with conservative management.  She is very pleased with the results of her MRI.    Patient is going to call us back if anything changes.  Patient will follow-up with us on an as-needed basis.    Encouraged her about avoiding falls and told her what to look for for progressive cervical myelopathic findings.    Also reviewed signs and symptoms of the lumbar spine that would require follow-up.    Patient had multiple questions about activities and restrictions and we went through all those and she had no unanswered questions at the end of the visit         No diagnosis found.

## 2024-11-20 ENCOUNTER — OFFICE VISIT (OUTPATIENT)
Dept: CARDIOLOGY | Facility: CLINIC | Age: 59
End: 2024-11-20
Payer: COMMERCIAL

## 2024-11-20 VITALS
RESPIRATION RATE: 16 BRPM | HEIGHT: 60 IN | HEART RATE: 77 BPM | DIASTOLIC BLOOD PRESSURE: 87 MMHG | OXYGEN SATURATION: 97 % | SYSTOLIC BLOOD PRESSURE: 168 MMHG | BODY MASS INDEX: 26.9 KG/M2 | WEIGHT: 137 LBS

## 2024-11-20 DIAGNOSIS — R07.9 CHEST PAIN, UNSPECIFIED TYPE: Primary | ICD-10-CM

## 2024-11-20 DIAGNOSIS — I10 ESSENTIAL HYPERTENSION: ICD-10-CM

## 2024-11-20 PROCEDURE — 3079F DIAST BP 80-89 MM HG: CPT | Performed by: INTERNAL MEDICINE

## 2024-11-20 PROCEDURE — 93000 ELECTROCARDIOGRAM COMPLETE: CPT | Performed by: INTERNAL MEDICINE

## 2024-11-20 PROCEDURE — 99213 OFFICE O/P EST LOW 20 MIN: CPT | Performed by: INTERNAL MEDICINE

## 2024-11-20 PROCEDURE — 3077F SYST BP >= 140 MM HG: CPT | Performed by: INTERNAL MEDICINE

## 2024-11-20 RX ORDER — MULTIVITAMIN WITH IRON
1 TABLET ORAL DAILY
COMMUNITY

## 2024-11-20 NOTE — PROGRESS NOTES
Anusha Licea MD  Diana Alfaro  1965  11/20/2024    Patient Active Problem List   Diagnosis    Essential hypertension    Hyperlipidemia    Precordial pain    Drug therapy changed    Chest pain    Cervical spondylosis with myelopathy    Neurogenic bladder    Club foot       Dear Anusha Licea MD:    Subjective     Diana Alfaro is a 59 y.o. female with the problems as listed above, presents    Chief Complaint   Patient presents with    Follow-up     1 year    Med Management     verbal       History of Present Illness: Mrs. Diana Alfaro is a pleasant 59-year-old  female with history of chest pains which was evaluated with a stress echo study about a year ago which was normal.  She is here today as a regular follow-up.  On today's visit she denies any further chest pains shortness of breath.  She states her blood pressure at Dr. Phillips office is usually in the 130s on the top and 70s on the bottom.    Allergies   Allergen Reactions    Doxycycline Rash   :    Current Outpatient Medications:     Acetaminophen Extra Strength 500 MG tablet, Take 1 tablet by mouth Every 6 (Six) Hours., Disp: , Rfl:     atorvastatin (LIPITOR) 40 MG tablet, Take 1 tablet by mouth Daily., Disp: , Rfl:     B Complex-C (B-complex with vitamin C) tablet, Take 1 tablet by mouth Daily., Disp: , Rfl:     Calcium Carbonate-Vit D-Min (Caltrate 600+D Plus Minerals) 600-800 MG-UNIT chewable tablet, Chew., Disp: , Rfl:     fluticasone (FLONASE) 50 MCG/ACT nasal spray, Administer 2 sprays into the nostril(s) as directed by provider Daily., Disp: , Rfl:     ibuprofen (ADVIL,MOTRIN) 800 MG tablet, Take 1 tablet by mouth Every 6 (Six) Hours As Needed for Mild Pain., Disp: , Rfl:     ketorolac (ACULAR) 0.5 % ophthalmic solution, 1 drop 4 (Four) Times a Day., Disp: , Rfl:     ketotifen (ZADITOR) 0.025 % ophthalmic solution, 1 drop 2 (Two) Times a Day., Disp: , Rfl:     Loratadine 10 MG capsule, Take 1 capsule by mouth  Daily., Disp: , Rfl:     losartan (COZAAR) 50 MG tablet, Take 1 tablet by mouth 2 (Two) Times a Day. (Patient taking differently: Take 1 tablet by mouth Daily.), Disp: 60 tablet, Rfl: 5    CALCIUM-MAG-VIT C-VIT D PO, Take  by mouth., Disp: , Rfl:     carbamide peroxide (DEBROX) 6.5 % otic solution, , Disp: , Rfl:     ciprofloxacin (CILOXAN) 0.3 % ophthalmic solution, INSTILL ONE DROP IN THE RIGHT EYE 3 TIMES DAILY FOR 5 DAYS AND THEN TAPER TWICE A DAY FOR 1 DAY THEN EVERY DAY X 1 DAY, Disp: , Rfl:     guaifenesin (ROBITUSSIN) 100 MG/5ML liquid, 5 mL., Disp: , Rfl:     neomycin-polymyxin-hydrocortisone (CORTISPORIN) 1 % solution otic solution, , Disp: , Rfl:     ofloxacin (OCUFLOX) 0.3 % ophthalmic solution, INSTILL 1 DROP INTO BOTH EYES EVERY 4 HOURS, Disp: , Rfl:     Plant Sterol Stanol-Pantethine (CHOLESTOFF COMPLETE PO), Take 3 tablets by mouth 2 (Two) Times a Day., Disp: , Rfl:     vitamin D (ERGOCALCIFEROL) 1.25 MG (70565 UT) capsule capsule, TAKE 1 CAPSULE BY MOUTH EVERY WEEK FOR 8 WEEKS, Disp: , Rfl:     The following portions of the patient's history were reviewed and updated as appropriate: allergies, current medications, past family history, past medical history, past social history, past surgical history and problem list.    Social History     Tobacco Use    Smoking status: Never     Passive exposure: Never    Smokeless tobacco: Never   Vaping Use    Vaping status: Never Used   Substance Use Topics    Alcohol use: No    Drug use: No     Review of Systems   Constitutional: Negative for chills and fever.   HENT:  Negative for nosebleeds and sore throat.    Respiratory:  Negative for cough, hemoptysis and wheezing.    Gastrointestinal:  Negative for abdominal pain, hematemesis, hematochezia, melena, nausea and vomiting.   Genitourinary:  Negative for dysuria and hematuria.   Neurological:  Negative for headaches.     Objective   Vitals:    11/20/24 1147   BP: 172/82   Pulse: 85   Resp: 16   SpO2: 98%  "  Weight: 62.1 kg (137 lb)   Height: 152.4 cm (60\")     Body mass index is 26.76 kg/m².    Vitals reviewed.   Constitutional:       Appearance: Well-developed.   Eyes:      Conjunctiva/sclera: Conjunctivae normal.   HENT:      Head: Normocephalic.   Neck:      Thyroid: No thyromegaly.      Vascular: No JVD.      Trachea: No tracheal deviation.   Pulmonary:      Effort: No respiratory distress.      Breath sounds: Normal breath sounds. No wheezing. No rales.   Cardiovascular:      PMI at left midclavicular line. Normal rate. Regular rhythm. Normal S1. Normal S2.       Murmurs: There is no murmur.      No gallop.  No click. No rub.   Pulses:     Intact distal pulses.   Edema:     Peripheral edema absent.   Abdominal:      General: Bowel sounds are normal.      Palpations: Abdomen is soft. There is no abdominal mass.      Tenderness: There is no abdominal tenderness.   Musculoskeletal:      Cervical back: Normal range of motion and neck supple. Skin:     General: Skin is warm and dry.   Neurological:      Mental Status: Alert and oriented to person, place, and time.      Cranial Nerves: No cranial nerve deficit.       Lab Results   Component Value Date     07/04/2021    K 4.0 07/04/2021     07/04/2021    CO2 26.1 07/04/2021    BUN 16 07/04/2021    CREATININE 0.74 07/04/2021    GLUCOSE 116 (H) 07/04/2021    CALCIUM 9.7 07/04/2021    AST 23 07/04/2021    ALT 23 07/04/2021    ALKPHOS 90 07/04/2021     Lab Results   Component Value Date    CKTOTAL 61 03/05/2018     Lab Results   Component Value Date    WBC 7.91 07/04/2021    HGB 13.5 07/04/2021    HCT 40.5 07/04/2021     07/04/2021     Lab Results   Component Value Date    INR 0.89 (L) 03/04/2018    INR 0.89 05/25/2017     Lab Results   Component Value Date    MG 2.0 07/04/2021     Lab Results   Component Value Date    TSH 0.860 07/04/2021    TRIG 93 03/05/2018    HDL 43 (L) 03/05/2018     (H) 03/05/2018      Lab Results   Component Value Date    " BNP 26.0 05/25/2017       ECG 12 Lead    Date/Time: 11/20/2024 11:48 AM  Performed by: Cornell Perkins MD    Authorized by: Cornell Perkins MD  Comparison: compared with previous ECG from 9/27/2023  Similar to previous ECG  Rhythm: sinus rhythm  Conduction: conduction normal  ST Segments: ST segments normal    Clinical impression: normal ECG            Assessment & Plan    Diagnosis Plan   1. Chest pain, unspecified type, resolved.        2. Essential hypertension with elevated systolic blood pressure today but usually better controlled.          Recommendations  Continue with losartan and keep a check on the blood pressure at home twice a day and take the results to the next visit with Dr. Phillips.  I will sign off on her  Since her cardiac status appears to be stable at this time and we will be glad to see her back if she has any cardiac issues in future.    No follow-ups on file.    As always, Anusha Licea MD  I appreciate very much the opportunity to participate in the cardiovascular care of your patients. Please do not hesitate to call me with any questions with regards to Diana Santos Angelina's evaluation and management.       With Best Regards,        Cornell Perkins MD, [facility]    Please note that portions of this note were completed with a voice recognition program.

## (undated) DEVICE — Device

## (undated) DEVICE — C-ARM: Brand: UNBRANDED

## (undated) DEVICE — TOOL 14MH30 LEGEND 14CM 3MM: Brand: MIDAS REX ™

## (undated) DEVICE — SUT GUT PLN FAST ABS 5/0 PC1 18IN 1915G

## (undated) DEVICE — SPLINT 1522000 20PK PAIR SIMPLESPLINTS

## (undated) DEVICE — SUT ETHLN 3/0 PSLX 30IN 1683H

## (undated) DEVICE — ANTIBACTERIAL UNDYED BRAIDED (POLYGLACTIN 910), SYNTHETIC ABSORBABLE SUTURE: Brand: COATED VICRYL

## (undated) DEVICE — SUT NLY 2/0 664G

## (undated) DEVICE — MARKR SKIN W/RULR AND LBL

## (undated) DEVICE — TOOL 14BA60 LEGEND 14CM 6MM: Brand: MIDAS REX ™

## (undated) DEVICE — SPNG GZ WOVN 4X4IN 12PLY 10/BX STRL

## (undated) DEVICE — PROXIMATE RH ROTATING HEAD SKIN STAPLERS (35 WIDE) CONTAINS 35 STAINLESS STEEL STAPLES: Brand: PROXIMATE

## (undated) DEVICE — NEEDLE, QUINCKE 22GX3.5": Brand: MEDLINE INDUSTRIES, INC.

## (undated) DEVICE — STRAP POSTN KN/BDY FM 5X72IN DISP

## (undated) DEVICE — PATIENT RETURN ELECTRODE, SINGLE-USE, CONTACT QUALITY MONITORING, ADULT, WITH 9FT CORD, FOR PATIENTS WEIGING OVER 33LBS. (15KG): Brand: MEGADYNE

## (undated) DEVICE — GLV SURG BIOGEL LTX PF 8

## (undated) DEVICE — 3M™ DURAPORE™ SURGICAL TAPE 1538-3, 3 INCH X 10 YARD (7,5CM X 9,1M), 4 ROLLS/BOX: Brand: 3M™ DURAPORE™

## (undated) DEVICE — SUT GUT CHRM 4/0 FS2 27IN 635H

## (undated) DEVICE — DRSNG WND GZ PAD BORDERED 4X8IN STRL

## (undated) DEVICE — PAD GRND REM POLYHESIVE A/ DISP

## (undated) DEVICE — IRRIGATION TRAY WITH BULB SYRINGE: Brand: DOVER

## (undated) DEVICE — SPLINT 1524055 DOYLE II AIRWAY SET: Brand: DOYLE II ™

## (undated) DEVICE — SHEET,DRAPE,53X77,STERILE: Brand: MEDLINE

## (undated) DEVICE — NDL HYPO ECLPS SFTY 22G 1 1/2IN

## (undated) DEVICE — HOLDER: Brand: DEROYAL

## (undated) DEVICE — 3M™ STERI-STRIP™ REINFORCED ADHESIVE SKIN CLOSURES, R1547, 1/2 IN X 4 IN (12 MM X 100 MM), 6 STRIPS/ENVELOPE: Brand: 3M™ STERI-STRIP™

## (undated) DEVICE — ELECTRD BLD EZ CLN MOD XLNG 2.75IN

## (undated) DEVICE — DISPOSABLE BIPOLAR FORCEPS 7 3/4" (19.7CM) SCOVILLE BAYONET, INSULATED, 1.5MM TIP AND 12 FT. (3.6M) CABLE: Brand: KIRWAN

## (undated) DEVICE — GLV SURG PREMIERPRO MIC LTX PF SZ8 BRN

## (undated) DEVICE — MAYFIELD® DISPOSABLE ADULT SKULL PIN (PLASTIC BASE): Brand: MAYFIELD®

## (undated) DEVICE — APPL CHLORAPREP TINTED 26ML TEAL

## (undated) DEVICE — INTENDED FOR TISSUE SEPARATION, AND OTHER PROCEDURES THAT REQUIRE A SHARP SURGICAL BLADE TO PUNCTURE OR CUT.: Brand: BARD-PARKER ® STAINLESS STEEL BLADES

## (undated) DEVICE — PK NEURO DISC 10

## (undated) DEVICE — PK HD AND NK 70

## (undated) DEVICE — SYR CONTRL LUERLOK 10CC